# Patient Record
Sex: FEMALE | Race: WHITE | NOT HISPANIC OR LATINO | Employment: OTHER | URBAN - METROPOLITAN AREA
[De-identification: names, ages, dates, MRNs, and addresses within clinical notes are randomized per-mention and may not be internally consistent; named-entity substitution may affect disease eponyms.]

---

## 2018-01-08 ENCOUNTER — CONVERSION ENCOUNTER (OUTPATIENT)
Dept: MAMMOGRAPHY | Facility: HOSPITAL | Age: 68
End: 2018-01-08

## 2018-03-29 ENCOUNTER — OFFICE VISIT CONVERTED (OUTPATIENT)
Dept: SURGERY | Facility: CLINIC | Age: 68
End: 2018-03-29
Attending: SURGERY

## 2018-04-11 ENCOUNTER — OFFICE VISIT CONVERTED (OUTPATIENT)
Dept: SURGERY | Facility: CLINIC | Age: 68
End: 2018-04-11
Attending: SURGERY

## 2018-05-14 ENCOUNTER — OFFICE VISIT CONVERTED (OUTPATIENT)
Dept: UROLOGY | Facility: CLINIC | Age: 68
End: 2018-05-14
Attending: UROLOGY

## 2018-05-14 ENCOUNTER — CONVERSION ENCOUNTER (OUTPATIENT)
Dept: SURGERY | Facility: CLINIC | Age: 68
End: 2018-05-14

## 2018-08-16 ENCOUNTER — CONVERSION ENCOUNTER (OUTPATIENT)
Dept: SURGERY | Facility: CLINIC | Age: 68
End: 2018-08-16

## 2018-08-16 ENCOUNTER — OFFICE VISIT CONVERTED (OUTPATIENT)
Dept: UROLOGY | Facility: CLINIC | Age: 68
End: 2018-08-16
Attending: UROLOGY

## 2018-08-29 ENCOUNTER — OFFICE VISIT CONVERTED (OUTPATIENT)
Dept: UROLOGY | Facility: CLINIC | Age: 68
End: 2018-08-29
Attending: UROLOGY

## 2018-08-29 ENCOUNTER — CONVERSION ENCOUNTER (OUTPATIENT)
Dept: SURGERY | Facility: CLINIC | Age: 68
End: 2018-08-29

## 2018-10-31 ENCOUNTER — OFFICE VISIT CONVERTED (OUTPATIENT)
Dept: ORTHOPEDIC SURGERY | Facility: CLINIC | Age: 68
End: 2018-10-31
Attending: ORTHOPAEDIC SURGERY

## 2018-12-10 ENCOUNTER — OFFICE VISIT CONVERTED (OUTPATIENT)
Dept: UROLOGY | Facility: CLINIC | Age: 68
End: 2018-12-10
Attending: UROLOGY

## 2019-01-09 ENCOUNTER — HOSPITAL ENCOUNTER (OUTPATIENT)
Dept: OTHER | Facility: HOSPITAL | Age: 69
Discharge: HOME | End: 2019-01-09
Attending: INTERNAL MEDICINE

## 2019-01-10 LAB — HCV AB S/CO SERPL IA: 0.1 S/CO RATIO (ref 0–0.9)

## 2019-01-21 ENCOUNTER — CONVERSION ENCOUNTER (OUTPATIENT)
Dept: SURGERY | Facility: CLINIC | Age: 69
End: 2019-01-21

## 2019-01-21 ENCOUNTER — OFFICE VISIT CONVERTED (OUTPATIENT)
Dept: UROLOGY | Facility: CLINIC | Age: 69
End: 2019-01-21
Attending: UROLOGY

## 2019-02-18 ENCOUNTER — OFFICE VISIT CONVERTED (OUTPATIENT)
Dept: UROLOGY | Facility: CLINIC | Age: 69
End: 2019-02-18
Attending: UROLOGY

## 2019-02-18 ENCOUNTER — CONVERSION ENCOUNTER (OUTPATIENT)
Dept: SURGERY | Facility: CLINIC | Age: 69
End: 2019-02-18

## 2019-05-06 ENCOUNTER — OFFICE VISIT CONVERTED (OUTPATIENT)
Dept: ORTHOPEDIC SURGERY | Facility: CLINIC | Age: 69
End: 2019-05-06
Attending: ORTHOPAEDIC SURGERY

## 2019-06-10 ENCOUNTER — OFFICE VISIT CONVERTED (OUTPATIENT)
Dept: UROLOGY | Facility: CLINIC | Age: 69
End: 2019-06-10
Attending: UROLOGY

## 2019-06-10 ENCOUNTER — HOSPITAL ENCOUNTER (OUTPATIENT)
Dept: SURGERY | Facility: CLINIC | Age: 69
Discharge: HOME | End: 2019-06-10
Attending: UROLOGY

## 2019-06-12 LAB — BACTERIA UR CULT: NORMAL

## 2019-06-19 ENCOUNTER — OFFICE VISIT CONVERTED (OUTPATIENT)
Dept: SURGERY | Facility: CLINIC | Age: 69
End: 2019-06-19
Attending: SURGERY

## 2019-07-03 ENCOUNTER — OFFICE VISIT CONVERTED (OUTPATIENT)
Dept: SURGERY | Facility: CLINIC | Age: 69
End: 2019-07-03
Attending: SURGERY

## 2020-01-10 ENCOUNTER — HOSPITAL ENCOUNTER (OUTPATIENT)
Dept: MAMMOGRAPHY | Facility: HOSPITAL | Age: 70
Discharge: HOME | End: 2020-01-10
Attending: NURSE PRACTITIONER

## 2020-02-04 ENCOUNTER — OFFICE VISIT CONVERTED (OUTPATIENT)
Dept: ORTHOPEDIC SURGERY | Facility: CLINIC | Age: 70
End: 2020-02-04
Attending: ORTHOPAEDIC SURGERY

## 2020-02-04 ENCOUNTER — CONVERSION ENCOUNTER (OUTPATIENT)
Dept: ORTHOPEDIC SURGERY | Facility: CLINIC | Age: 70
End: 2020-02-04

## 2020-02-13 ENCOUNTER — OFFICE VISIT CONVERTED (OUTPATIENT)
Dept: ORTHOPEDIC SURGERY | Facility: CLINIC | Age: 70
End: 2020-02-13
Attending: PHYSICIAN ASSISTANT

## 2020-02-13 ENCOUNTER — CONVERSION ENCOUNTER (OUTPATIENT)
Dept: ORTHOPEDIC SURGERY | Facility: CLINIC | Age: 70
End: 2020-02-13

## 2020-03-05 ENCOUNTER — CONVERSION ENCOUNTER (OUTPATIENT)
Dept: ORTHOPEDIC SURGERY | Facility: CLINIC | Age: 70
End: 2020-03-05

## 2020-03-05 ENCOUNTER — OFFICE VISIT CONVERTED (OUTPATIENT)
Dept: ORTHOPEDIC SURGERY | Facility: CLINIC | Age: 70
End: 2020-03-05
Attending: PHYSICIAN ASSISTANT

## 2020-06-30 ENCOUNTER — HOSPITAL ENCOUNTER (OUTPATIENT)
Dept: OTHER | Facility: HOSPITAL | Age: 70
Discharge: HOME | End: 2020-06-30
Attending: NURSE PRACTITIONER

## 2020-06-30 LAB
APPEARANCE UR: ABNORMAL
BILIRUB UR QL: NEGATIVE
COLOR UR: YELLOW
CONV BACTERIA: ABNORMAL
CONV COLLECTION SOURCE (UA): ABNORMAL
CONV UROBILINOGEN IN URINE BY AUTOMATED TEST STRIP: 0.2 {EHRLICHU}/DL (ref 0.1–1)
CONV YEAST, UA: PRESENT
GLUCOSE UR QL: NEGATIVE MG/DL
HGB UR QL STRIP: NEGATIVE
KETONES UR QL STRIP: NEGATIVE MG/DL
LEUKOCYTE ESTERASE UR QL STRIP: ABNORMAL
NITRITE UR QL STRIP: NEGATIVE
PH UR STRIP.AUTO: 6.5 [PH] (ref 5–8)
PROT UR QL: NEGATIVE MG/DL
RBC #/AREA URNS HPF: ABNORMAL /[HPF]
SP GR UR: 1.01 (ref 1–1.03)
SQUAMOUS SPT QL MICRO: ABNORMAL /[HPF]
WBC #/AREA URNS HPF: ABNORMAL /[HPF]

## 2020-07-03 LAB
AMOXICILLIN+CLAV SUSC ISLT: 16
AMPICILLIN SUSC ISLT: >=32
AMPICILLIN+SULBAC SUSC ISLT: >=32
BACTERIA UR CULT: ABNORMAL
CEFAZOLIN SUSC ISLT: <=4
CEFEPIME SUSC ISLT: <=1
CEFTAZIDIME SUSC ISLT: <=1
CEFTRIAXONE SUSC ISLT: <=1
CEFUROXIME ORAL SUSC ISLT: 4
CEFUROXIME PARENTER SUSC ISLT: 4
CIPROFLOXACIN SUSC ISLT: <=0.25
ERTAPENEM SUSC ISLT: <=0.5
GENTAMICIN SUSC ISLT: <=1
LEVOFLOXACIN SUSC ISLT: <=0.12
NITROFURANTOIN SUSC ISLT: <=16
TETRACYCLINE SUSC ISLT: <=1
TMP SMX SUSC ISLT: >=320
TOBRAMYCIN SUSC ISLT: <=1

## 2020-08-13 ENCOUNTER — HOSPITAL ENCOUNTER (OUTPATIENT)
Dept: OTHER | Facility: HOSPITAL | Age: 70
Discharge: HOME | End: 2020-08-13

## 2020-08-31 ENCOUNTER — HOSPITAL ENCOUNTER (OUTPATIENT)
Dept: INFUSION THERAPY | Facility: HOSPITAL | Age: 70
Discharge: HOME | End: 2020-08-31
Attending: NURSE PRACTITIONER

## 2020-08-31 LAB
ANION GAP SERPL CALC-SCNC: 15 MMOL/L (ref 8–19)
BUN SERPL-MCNC: 12 MG/DL (ref 5–25)
BUN/CREAT SERPL: 15 {RATIO} (ref 6–20)
CALCIUM SERPL-MCNC: 10 MG/DL (ref 8.7–10.4)
CHLORIDE SERPL-SCNC: 102 MMOL/L (ref 99–111)
CONV CO2: 25 MMOL/L (ref 22–32)
CREAT UR-MCNC: 0.8 MG/DL (ref 0.5–0.9)
GFR SERPLBLD BASED ON 1.73 SQ M-ARVRAT: >60 ML/MIN/{1.73_M2}
GLUCOSE SERPL-MCNC: 138 MG/DL (ref 65–99)
OSMOLALITY SERPL CALC.SUM OF ELEC: 288 MOSM/KG (ref 273–304)
POTASSIUM SERPL-SCNC: 4.2 MMOL/L (ref 3.5–5.3)
SODIUM SERPL-SCNC: 138 MMOL/L (ref 135–147)

## 2020-10-06 ENCOUNTER — HOSPITAL ENCOUNTER (OUTPATIENT)
Dept: OTHER | Facility: HOSPITAL | Age: 70
Discharge: HOME | End: 2020-10-06
Attending: PODIATRIST

## 2020-10-06 LAB
ALBUMIN SERPL-MCNC: 4.1 G/DL (ref 3.5–5)
ALP SERPL-CCNC: 96 U/L (ref 43–160)
ALT SERPL-CCNC: 30 U/L (ref 10–40)
AST SERPL-CCNC: 23 U/L (ref 15–50)
BILIRUB SERPL-MCNC: 0.42 MG/DL (ref 0.2–1.3)
CONV BILI, CONJUGATED: <0.2 MG/DL (ref 0–0.6)
CONV TOTAL PROTEIN: 6.9 G/DL (ref 6.3–8.2)
CONV UNCONJUGATED BILIRUBIN: 0.2 MG/DL (ref 0–1.1)

## 2021-01-05 ENCOUNTER — HOSPITAL ENCOUNTER (OUTPATIENT)
Dept: OTHER | Facility: HOSPITAL | Age: 71
Discharge: HOME | End: 2021-01-05
Attending: NURSE PRACTITIONER

## 2021-01-05 LAB
APPEARANCE UR: CLEAR
BILIRUB UR QL: NEGATIVE
COLOR UR: YELLOW
CONV BACTERIA: ABNORMAL
CONV COLLECTION SOURCE (UA): ABNORMAL
CONV UROBILINOGEN IN URINE BY AUTOMATED TEST STRIP: 0.2 {EHRLICHU}/DL (ref 0.1–1)
GLUCOSE UR QL: NEGATIVE MG/DL
HGB UR QL STRIP: NEGATIVE
KETONES UR QL STRIP: NEGATIVE MG/DL
LEUKOCYTE ESTERASE UR QL STRIP: ABNORMAL
NITRITE UR QL STRIP: NEGATIVE
PH UR STRIP.AUTO: 6.5 [PH] (ref 5–8)
PROT UR QL: NEGATIVE MG/DL
RBC #/AREA URNS HPF: ABNORMAL /[HPF]
SP GR UR: 1.01 (ref 1–1.03)
SQUAMOUS SPT QL MICRO: ABNORMAL /[HPF]
WBC #/AREA URNS HPF: ABNORMAL /[HPF]

## 2021-01-08 LAB
AMPICILLIN SUSC ISLT: <=2
AMPICILLIN+SULBAC SUSC ISLT: <=2
BACTERIA UR CULT: ABNORMAL
CEFAZOLIN SUSC ISLT: <=4
CEFEPIME SUSC ISLT: <=0.12
CEFTAZIDIME SUSC ISLT: <=1
CEFTRIAXONE SUSC ISLT: <=0.25
CIPROFLOXACIN SUSC ISLT: <=0.25
ERTAPENEM SUSC ISLT: <=0.12
GENTAMICIN SUSC ISLT: <=1
LEVOFLOXACIN SUSC ISLT: <=0.12
NITROFURANTOIN SUSC ISLT: <=16
PIP+TAZO SUSC ISLT: <=4
TMP SMX SUSC ISLT: <=20
TOBRAMYCIN SUSC ISLT: <=1

## 2021-03-04 ENCOUNTER — HOSPITAL ENCOUNTER (OUTPATIENT)
Dept: OTHER | Facility: HOSPITAL | Age: 71
Discharge: HOME | End: 2021-03-04
Attending: NURSE PRACTITIONER

## 2021-03-04 LAB
APPEARANCE UR: ABNORMAL
BILIRUB UR QL: NEGATIVE
COLOR UR: YELLOW
CONV BACTERIA: ABNORMAL
CONV COLLECTION SOURCE (UA): ABNORMAL
CONV HYALINE CASTS IN URINE MICRO: ABNORMAL /[LPF]
CONV UROBILINOGEN IN URINE BY AUTOMATED TEST STRIP: 0.2 {EHRLICHU}/DL (ref 0.1–1)
GLUCOSE UR QL: NEGATIVE MG/DL
HGB UR QL STRIP: NEGATIVE
KETONES UR QL STRIP: NEGATIVE MG/DL
LEUKOCYTE ESTERASE UR QL STRIP: ABNORMAL
NITRITE UR QL STRIP: NEGATIVE
PH UR STRIP.AUTO: 5.5 [PH] (ref 5–8)
PROT UR QL: NEGATIVE MG/DL
RBC #/AREA URNS HPF: ABNORMAL /[HPF]
SP GR UR: 1.01 (ref 1–1.03)
SQUAMOUS SPT QL MICRO: ABNORMAL /[HPF]
WBC #/AREA URNS HPF: ABNORMAL /[HPF]

## 2021-03-07 LAB
AMPICILLIN SUSC ISLT: 4
AMPICILLIN+SULBAC SUSC ISLT: <=2
BACTERIA UR CULT: ABNORMAL
CEFAZOLIN SUSC ISLT: <=4
CEFEPIME SUSC ISLT: <=0.12
CEFTAZIDIME SUSC ISLT: <=1
CEFTRIAXONE SUSC ISLT: <=0.25
CIPROFLOXACIN SUSC ISLT: <=0.25
ERTAPENEM SUSC ISLT: <=0.12
GENTAMICIN SUSC ISLT: <=1
LEVOFLOXACIN SUSC ISLT: <=0.12
NITROFURANTOIN SUSC ISLT: <=16
PIP+TAZO SUSC ISLT: <=4
TMP SMX SUSC ISLT: <=20
TOBRAMYCIN SUSC ISLT: <=1

## 2021-05-15 VITALS — HEIGHT: 63 IN | WEIGHT: 160 LBS | RESPIRATION RATE: 16 BRPM | BODY MASS INDEX: 28.35 KG/M2

## 2021-05-15 VITALS
BODY MASS INDEX: 29.28 KG/M2 | SYSTOLIC BLOOD PRESSURE: 146 MMHG | HEIGHT: 62 IN | DIASTOLIC BLOOD PRESSURE: 76 MMHG | WEIGHT: 159.12 LBS

## 2021-05-15 VITALS — OXYGEN SATURATION: 96 % | HEART RATE: 92 BPM | HEIGHT: 63 IN

## 2021-05-15 VITALS — BODY MASS INDEX: 28 KG/M2 | HEIGHT: 63 IN | HEART RATE: 110 BPM | WEIGHT: 158 LBS | OXYGEN SATURATION: 97 %

## 2021-05-15 VITALS — HEIGHT: 63 IN | BODY MASS INDEX: 28.93 KG/M2 | RESPIRATION RATE: 16 BRPM | WEIGHT: 163.25 LBS

## 2021-05-15 VITALS — BODY MASS INDEX: 29.23 KG/M2 | HEIGHT: 63 IN | WEIGHT: 165 LBS | HEART RATE: 120 BPM | OXYGEN SATURATION: 98 %

## 2021-05-15 VITALS — HEART RATE: 64 BPM | BODY MASS INDEX: 28.7 KG/M2 | OXYGEN SATURATION: 96 % | WEIGHT: 162 LBS | HEIGHT: 63 IN

## 2021-05-16 VITALS — WEIGHT: 16 LBS | HEIGHT: 64 IN | BODY MASS INDEX: 2.73 KG/M2 | RESPIRATION RATE: 16 BRPM

## 2021-05-16 VITALS
DIASTOLIC BLOOD PRESSURE: 72 MMHG | HEIGHT: 63 IN | BODY MASS INDEX: 28.88 KG/M2 | SYSTOLIC BLOOD PRESSURE: 116 MMHG | WEIGHT: 163 LBS

## 2021-05-16 VITALS
BODY MASS INDEX: 28.7 KG/M2 | HEIGHT: 63 IN | WEIGHT: 162 LBS | DIASTOLIC BLOOD PRESSURE: 82 MMHG | SYSTOLIC BLOOD PRESSURE: 140 MMHG

## 2021-05-16 VITALS — BODY MASS INDEX: 28.88 KG/M2 | HEIGHT: 63 IN | RESPIRATION RATE: 14 BRPM | WEIGHT: 163 LBS

## 2021-05-16 VITALS
SYSTOLIC BLOOD PRESSURE: 140 MMHG | BODY MASS INDEX: 27.34 KG/M2 | HEIGHT: 64 IN | DIASTOLIC BLOOD PRESSURE: 71 MMHG | WEIGHT: 160.12 LBS

## 2021-05-16 VITALS
SYSTOLIC BLOOD PRESSURE: 142 MMHG | BODY MASS INDEX: 27.55 KG/M2 | HEIGHT: 64 IN | DIASTOLIC BLOOD PRESSURE: 80 MMHG | WEIGHT: 161.37 LBS

## 2021-05-16 VITALS
SYSTOLIC BLOOD PRESSURE: 138 MMHG | BODY MASS INDEX: 28.79 KG/M2 | WEIGHT: 162.5 LBS | DIASTOLIC BLOOD PRESSURE: 70 MMHG | HEIGHT: 63 IN

## 2021-05-16 VITALS — HEART RATE: 84 BPM | OXYGEN SATURATION: 97 % | HEIGHT: 63 IN | BODY MASS INDEX: 28.7 KG/M2 | WEIGHT: 162 LBS

## 2021-05-16 VITALS — HEIGHT: 64 IN | RESPIRATION RATE: 16 BRPM | WEIGHT: 164 LBS | BODY MASS INDEX: 28 KG/M2

## 2021-05-22 ENCOUNTER — TRANSCRIBE ORDERS (OUTPATIENT)
Dept: ADMINISTRATIVE | Facility: HOSPITAL | Age: 71
End: 2021-05-22

## 2021-05-22 DIAGNOSIS — M81.0 OSTEOPOROSIS, UNSPECIFIED OSTEOPOROSIS TYPE, UNSPECIFIED PATHOLOGICAL FRACTURE PRESENCE: Primary | ICD-10-CM

## 2021-05-22 DIAGNOSIS — Z12.39 SCREENING BREAST EXAMINATION: Primary | ICD-10-CM

## 2021-05-27 ENCOUNTER — HOSPITAL ENCOUNTER (OUTPATIENT)
Dept: OTHER | Facility: HOSPITAL | Age: 71
Discharge: HOME | End: 2021-05-27
Attending: NURSE PRACTITIONER

## 2021-05-29 LAB — BACTERIA UR CULT: NORMAL

## 2021-06-02 ENCOUNTER — OFFICE VISIT CONVERTED (OUTPATIENT)
Dept: ORTHOPEDIC SURGERY | Facility: CLINIC | Age: 71
End: 2021-06-02
Attending: ORTHOPAEDIC SURGERY

## 2021-06-05 NOTE — H&P
History and Physical      Patient Name: Rebecca Mchugh   Patient ID: [id number]   Sex: Female   YOB: 1950    Primary Care Provider: Colin Jamil MD   Referring Provider: Heather FRANCIS    Visit Date: June 2, 2021    Provider: Eyad Griffith MD   Location: [facility]   Location Address: [address] [place] [address]  [id number]   Location Phone: [phone]          Chief Complaint  · Right Trigger Thumb      History Of Present Illness  Rebecca Mchugh is a 70 year old /White female who presents today to [facility].      Patient presents today for an evaluation of right thumb. She states she has locking and catching of the thumb. She states locking and catching has been ongoing for 2 months. She states that is has become more painful overtime. She denies any trauma or injury to her right thumb.       Past Medical History  Bladder disorder; Breast CA; Cancer; Colitis; Diabetes; Diabetes mellitus; GERD; Hyperlipemia; Recurrent UTI (urinary tract infection); Reflux; Renal calculus or stone; Seasonal allergies; Urinary incontinence in female         Past Surgical History  Back; Bladder Repair; Breast biopsy; Cholecystecomy; Colonoscopy; Cystoscopy with dilation; Excision of vaginal mesh by vaginal approach; Eye Implant; Gallbladder; Hysterectomy; Hysterectomy-Abdominal; Lumpectomy of right breast mass         Medication List  Anusol-HC 25 mg rectal suppository; aspirin 81 mg oral tablet,delayed release (DR/EC); bethanechol chloride 25 mg oral tablet; Calcium 600 + D(3) 600 mg(1,500mg) -400 unit oral tablet; Centrum Silver oral; Estrace 0.01 % (0.1 mg/gram) vaginal cream; Fiber Gummies (with chromium) 2-100 gram-mcg oral tablet,chewable; Fish Oil 1,000 mg (120 mg-180 mg) oral capsule; Flomax 0.4 mg oral capsule; glipizide 5 mg oral tablet; Januvia 100 mg oral tablet; Jardiance 10 mg oral tablet; levocetirizine 5 mg oral tablet; lidocaine HCl 2 % mucous membrane  "jelly in applicator; Lipitor 20 mg oral tablet; metformin 500 mg oral tablet; Myrbetriq 25 mg oral tablet extended release 24 hr; Norco 7.5-325 mg oral tablet; omeprazole 40 mg oral capsule,delayed release(DR/EC); Trulicity 0.75 mg/0.5 mL subcutaneous pen injector         Allergy List  NO KNOWN DRUG ALLERGIES; Strawberry       Allergies Reconciled  Family Medical History  Heart Disease; Cancer, Unspecified; Diabetes, unspecified type; Breast Neoplasm; Family history of breast cancer; -Father's Family History Unknown         Social History  Alcohol (Never); Alcohol Use (Never); Caffeine (Current every day); lives alone; Recreational Drug Use (Never); Retired; Retired.; Second hand smoke exposure (Never); Tobacco (Former);          Review of Systems  · Constitutional  o Denies  o : fever, chills, weight loss  · Cardiovascular  o Denies  o : chest pain, shortness of breath  · Gastrointestinal  o Denies  o : liver disease, heartburn, nausea, blood in stools  · Genitourinary  o Denies  o : painful urination, blood in urine  · Integument  o Denies  o : rash, itching  · Neurologic  o Denies  o : headache, weakness, loss of consciousness  · Musculoskeletal  o Denies  o : painful, swollen joints  · Psychiatric  o Denies  o : drug/alcohol addiction, anxiety, depression      Vitals  Date Time BP Position Site L\R Cuff Size HR RR TEMP (F) WT  HT  BMI kg/m2 BSA m2 O2 Sat FR L/min FiO2 HC       06/02/2021 12:55 PM         157lbs 16oz 5'  3\" 27.99 1.78             Physical Examination  · Constitutional  o Appearance  o : well developed, well-nourished, no obvious deformities present  · Head and Face  o Head  o :   § Inspection  § : normocephalic  o Face  o :   § Inspection  § : no facial lesions  · Eyes  o Conjunctivae  o : conjunctivae normal  o Sclerae  o : sclerae white  · Ears, Nose, Mouth and Throat  o Ears  o :   § External Ears  § : appearance within normal limits  § Hearing  § : intact  o Nose  o :   § External " Nose  § : appearance normal  · Neck  o Inspection/Palpation  o : normal appearance  o Range of Motion  o : full range of motion  · Respiratory  o Respiratory Effort  o : breathing unlabored  o Inspection of Chest  o : normal appearance  o Auscultation of Lungs  o : no audible wheezing or rales  · Cardiovascular  o Heart  o : regular rate  · Gastrointestinal  o Abdominal Examination  o : soft and non-tender  · Skin and Subcutaneous Tissue  o General Inspection  o : intact, no rashes  · Psychiatric  o General  o : Alert and oriented x3  o Judgement and Insight  o : judgment and insight intact  o Mood and Affect  o : mood normal, affect appropriate  · Right Hand  o Inspection  o : Tender A1 pulley. Positive triggering of the thumb. Neurovascular intact. Sensation grossly intact. No swelling, atrophy, and skin discoloration. Skin intact. Full ROM. Patient able to wiggle fingers and make a fist. Full wrist extension, full wrist flexion, full , full thumb opposition, full PIP flexors, full DIP flexors, full PIP extensors, full finger adduction, full finger abduction. Radial pulse 2+, ulnar pulse 2+.          Assessment  · Trigger finger of right thumb     727.03/M65.311      Plan  · Medications  o Medications have been Reconciled  o Transition of Care or Provider Policy  · Instructions  o  Been saw and examined the patient and agrees with plan.   o Reviewed the patient's Past Medical, Social, and Family history as well as the ROS at today's visit, no changes.  o Call or return if worsening symptoms.  o Discussed surgery.  o Risks/benefits discussed with patient including, but not limited to: infection, bleeding, neurovascular damage, re-rupture, aesthetic deformity, need for further surgery, and death.  o Surgery pamphlet given.  o Follow Up post-op.  o Discussed treatment plans and diagnosis with the patient. Discussed operative vs non-operative measures. Patient wishes to proceed with a right thumb trigger  release.   o The above service was scribed by Elsie Scott on my behalf and I attest to the accuracy of the note. surjit  o Electronically Identified Patient Education Materials Provided Electronically            Electronically Signed by: Elsie Scott-, Other -Author on Sharon 3, 2021 10:31:17 AM  Electronically Co-signed by: Eyad Griffith MD -Reviewer on June 4, 2021 05:28:25 PM

## 2021-06-05 NOTE — H&P (VIEW-ONLY)
History and Physical      Patient Name: Rebecca Mchugh   Patient ID: [id number]   Sex: Female   YOB: 1950    Primary Care Provider: Colin Jamil MD   Referring Provider: Heather FRANCIS    Visit Date: June 2, 2021    Provider: Eyad Griffith MD   Location: [facility]   Location Address: [address] [place] [address]  [id number]   Location Phone: [phone]          Chief Complaint  · Right Trigger Thumb      History Of Present Illness  Rebecca Mchugh is a 70 year old /White female who presents today to [facility].      Patient presents today for an evaluation of right thumb. She states she has locking and catching of the thumb. She states locking and catching has been ongoing for 2 months. She states that is has become more painful overtime. She denies any trauma or injury to her right thumb.       Past Medical History  Bladder disorder; Breast CA; Cancer; Colitis; Diabetes; Diabetes mellitus; GERD; Hyperlipemia; Recurrent UTI (urinary tract infection); Reflux; Renal calculus or stone; Seasonal allergies; Urinary incontinence in female         Past Surgical History  Back; Bladder Repair; Breast biopsy; Cholecystecomy; Colonoscopy; Cystoscopy with dilation; Excision of vaginal mesh by vaginal approach; Eye Implant; Gallbladder; Hysterectomy; Hysterectomy-Abdominal; Lumpectomy of right breast mass         Medication List  Anusol-HC 25 mg rectal suppository; aspirin 81 mg oral tablet,delayed release (DR/EC); bethanechol chloride 25 mg oral tablet; Calcium 600 + D(3) 600 mg(1,500mg) -400 unit oral tablet; Centrum Silver oral; Estrace 0.01 % (0.1 mg/gram) vaginal cream; Fiber Gummies (with chromium) 2-100 gram-mcg oral tablet,chewable; Fish Oil 1,000 mg (120 mg-180 mg) oral capsule; Flomax 0.4 mg oral capsule; glipizide 5 mg oral tablet; Januvia 100 mg oral tablet; Jardiance 10 mg oral tablet; levocetirizine 5 mg oral tablet; lidocaine HCl 2 % mucous membrane  "jelly in applicator; Lipitor 20 mg oral tablet; metformin 500 mg oral tablet; Myrbetriq 25 mg oral tablet extended release 24 hr; Norco 7.5-325 mg oral tablet; omeprazole 40 mg oral capsule,delayed release(DR/EC); Trulicity 0.75 mg/0.5 mL subcutaneous pen injector         Allergy List  NO KNOWN DRUG ALLERGIES; Strawberry       Allergies Reconciled  Family Medical History  Heart Disease; Cancer, Unspecified; Diabetes, unspecified type; Breast Neoplasm; Family history of breast cancer; -Father's Family History Unknown         Social History  Alcohol (Never); Alcohol Use (Never); Caffeine (Current every day); lives alone; Recreational Drug Use (Never); Retired; Retired.; Second hand smoke exposure (Never); Tobacco (Former);          Review of Systems  · Constitutional  o Denies  o : fever, chills, weight loss  · Cardiovascular  o Denies  o : chest pain, shortness of breath  · Gastrointestinal  o Denies  o : liver disease, heartburn, nausea, blood in stools  · Genitourinary  o Denies  o : painful urination, blood in urine  · Integument  o Denies  o : rash, itching  · Neurologic  o Denies  o : headache, weakness, loss of consciousness  · Musculoskeletal  o Denies  o : painful, swollen joints  · Psychiatric  o Denies  o : drug/alcohol addiction, anxiety, depression      Vitals  Date Time BP Position Site L\R Cuff Size HR RR TEMP (F) WT  HT  BMI kg/m2 BSA m2 O2 Sat FR L/min FiO2 HC       06/02/2021 12:55 PM         157lbs 16oz 5'  3\" 27.99 1.78             Physical Examination  · Constitutional  o Appearance  o : well developed, well-nourished, no obvious deformities present  · Head and Face  o Head  o :   § Inspection  § : normocephalic  o Face  o :   § Inspection  § : no facial lesions  · Eyes  o Conjunctivae  o : conjunctivae normal  o Sclerae  o : sclerae white  · Ears, Nose, Mouth and Throat  o Ears  o :   § External Ears  § : appearance within normal limits  § Hearing  § : intact  o Nose  o :   § External " Nose  § : appearance normal  · Neck  o Inspection/Palpation  o : normal appearance  o Range of Motion  o : full range of motion  · Respiratory  o Respiratory Effort  o : breathing unlabored  o Inspection of Chest  o : normal appearance  o Auscultation of Lungs  o : no audible wheezing or rales  · Cardiovascular  o Heart  o : regular rate  · Gastrointestinal  o Abdominal Examination  o : soft and non-tender  · Skin and Subcutaneous Tissue  o General Inspection  o : intact, no rashes  · Psychiatric  o General  o : Alert and oriented x3  o Judgement and Insight  o : judgment and insight intact  o Mood and Affect  o : mood normal, affect appropriate  · Right Hand  o Inspection  o : Tender A1 pulley. Positive triggering of the thumb. Neurovascular intact. Sensation grossly intact. No swelling, atrophy, and skin discoloration. Skin intact. Full ROM. Patient able to wiggle fingers and make a fist. Full wrist extension, full wrist flexion, full , full thumb opposition, full PIP flexors, full DIP flexors, full PIP extensors, full finger adduction, full finger abduction. Radial pulse 2+, ulnar pulse 2+.          Assessment  · Trigger finger of right thumb     727.03/M65.311      Plan  · Medications  o Medications have been Reconciled  o Transition of Care or Provider Policy  · Instructions  o  Been saw and examined the patient and agrees with plan.   o Reviewed the patient's Past Medical, Social, and Family history as well as the ROS at today's visit, no changes.  o Call or return if worsening symptoms.  o Discussed surgery.  o Risks/benefits discussed with patient including, but not limited to: infection, bleeding, neurovascular damage, re-rupture, aesthetic deformity, need for further surgery, and death.  o Surgery pamphlet given.  o Follow Up post-op.  o Discussed treatment plans and diagnosis with the patient. Discussed operative vs non-operative measures. Patient wishes to proceed with a right thumb trigger  release.   o The above service was scribed by Elsie Scott on my behalf and I attest to the accuracy of the note. surjit  o Electronically Identified Patient Education Materials Provided Electronically            Electronically Signed by: Elsie Scott-, Other -Author on Sharon 3, 2021 10:31:17 AM  Electronically Co-signed by: Eyad Griffith MD -Reviewer on June 4, 2021 05:28:25 PM

## 2021-06-16 RX ORDER — OMEPRAZOLE 40 MG/1
40 CAPSULE, DELAYED RELEASE ORAL DAILY
COMMUNITY

## 2021-06-16 RX ORDER — AMITRIPTYLINE HYDROCHLORIDE 25 MG/1
25 TABLET, FILM COATED ORAL NIGHTLY
COMMUNITY

## 2021-06-16 RX ORDER — PHENOL 1.4 %
600 AEROSOL, SPRAY (ML) MUCOUS MEMBRANE 2 TIMES DAILY WITH MEALS
COMMUNITY

## 2021-06-16 RX ORDER — MULTIPLE VITAMINS W/ MINERALS TAB 9MG-400MCG
1 TAB ORAL DAILY
COMMUNITY

## 2021-06-16 RX ORDER — FESOTERODINE FUMARATE 8 MG/1
8 TABLET, EXTENDED RELEASE ORAL
COMMUNITY

## 2021-06-16 RX ORDER — ATORVASTATIN CALCIUM 40 MG/1
40 TABLET, FILM COATED ORAL DAILY
COMMUNITY

## 2021-06-16 RX ORDER — LEVOCETIRIZINE DIHYDROCHLORIDE 5 MG/1
5 TABLET, FILM COATED ORAL EVERY EVENING
COMMUNITY

## 2021-06-17 ENCOUNTER — HOSPITAL ENCOUNTER (OUTPATIENT)
Facility: HOSPITAL | Age: 71
Setting detail: HOSPITAL OUTPATIENT SURGERY
Discharge: HOME | End: 2021-06-17
Attending: ORTHOPAEDIC SURGERY | Admitting: ORTHOPAEDIC SURGERY

## 2021-06-17 ENCOUNTER — ANESTHESIA (OUTPATIENT)
Dept: PERIOP | Facility: HOSPITAL | Age: 71
End: 2021-06-17

## 2021-06-17 ENCOUNTER — ANESTHESIA EVENT (OUTPATIENT)
Dept: PERIOP | Facility: HOSPITAL | Age: 71
End: 2021-06-17

## 2021-06-17 VITALS
BODY MASS INDEX: 26.17 KG/M2 | HEART RATE: 86 BPM | SYSTOLIC BLOOD PRESSURE: 138 MMHG | OXYGEN SATURATION: 92 % | WEIGHT: 147.71 LBS | DIASTOLIC BLOOD PRESSURE: 68 MMHG | TEMPERATURE: 97 F | HEIGHT: 63 IN | RESPIRATION RATE: 14 BRPM

## 2021-06-17 DIAGNOSIS — M65.311 TRIGGER THUMB OF RIGHT HAND: Primary | ICD-10-CM

## 2021-06-17 PROCEDURE — 25010000002 FENTANYL CITRATE (PF) 50 MCG/ML SOLUTION: Performed by: NURSE ANESTHETIST, CERTIFIED REGISTERED

## 2021-06-17 PROCEDURE — 25010000002 PROPOFOL 10 MG/ML EMULSION: Performed by: NURSE ANESTHETIST, CERTIFIED REGISTERED

## 2021-06-17 PROCEDURE — 25010000002 MIDAZOLAM PER 1MG: Performed by: ANESTHESIOLOGY

## 2021-06-17 PROCEDURE — 26055 INCISE FINGER TENDON SHEATH: CPT | Performed by: ORTHOPAEDIC SURGERY

## 2021-06-17 PROCEDURE — 25010000002 DEXAMETHASONE PER 1 MG: Performed by: NURSE ANESTHETIST, CERTIFIED REGISTERED

## 2021-06-17 PROCEDURE — 25010000003 CEFAZOLIN IN DEXTROSE 2-4 GM/100ML-% SOLUTION: Performed by: ORTHOPAEDIC SURGERY

## 2021-06-17 PROCEDURE — 25010000002 ONDANSETRON PER 1 MG: Performed by: NURSE ANESTHETIST, CERTIFIED REGISTERED

## 2021-06-17 RX ORDER — ONDANSETRON 2 MG/ML
4 INJECTION INTRAMUSCULAR; INTRAVENOUS ONCE AS NEEDED
Status: DISCONTINUED | OUTPATIENT
Start: 2021-06-17 | End: 2021-06-17 | Stop reason: HOSPADM

## 2021-06-17 RX ORDER — PROMETHAZINE HYDROCHLORIDE 25 MG/1
25 SUPPOSITORY RECTAL ONCE AS NEEDED
Status: DISCONTINUED | OUTPATIENT
Start: 2021-06-17 | End: 2021-06-17 | Stop reason: HOSPADM

## 2021-06-17 RX ORDER — CEFAZOLIN SODIUM 2 G/100ML
2 INJECTION, SOLUTION INTRAVENOUS ONCE
Status: COMPLETED | OUTPATIENT
Start: 2021-06-17 | End: 2021-06-17

## 2021-06-17 RX ORDER — PROMETHAZINE HYDROCHLORIDE 12.5 MG/1
25 TABLET ORAL ONCE AS NEEDED
Status: DISCONTINUED | OUTPATIENT
Start: 2021-06-17 | End: 2021-06-17 | Stop reason: HOSPADM

## 2021-06-17 RX ORDER — MEPERIDINE HYDROCHLORIDE 25 MG/ML
12.5 INJECTION INTRAMUSCULAR; INTRAVENOUS; SUBCUTANEOUS
Status: DISCONTINUED | OUTPATIENT
Start: 2021-06-17 | End: 2021-06-17 | Stop reason: HOSPADM

## 2021-06-17 RX ORDER — HYDROCODONE BITARTRATE AND ACETAMINOPHEN 5; 325 MG/1; MG/1
1 TABLET ORAL EVERY 4 HOURS PRN
Qty: 10 TABLET | Refills: 0 | Status: SHIPPED | OUTPATIENT
Start: 2021-06-17 | End: 2022-05-12

## 2021-06-17 RX ORDER — ACETAMINOPHEN 500 MG
1000 TABLET ORAL ONCE
Status: COMPLETED | OUTPATIENT
Start: 2021-06-17 | End: 2021-06-17

## 2021-06-17 RX ORDER — SODIUM CHLORIDE, SODIUM LACTATE, POTASSIUM CHLORIDE, CALCIUM CHLORIDE 600; 310; 30; 20 MG/100ML; MG/100ML; MG/100ML; MG/100ML
9 INJECTION, SOLUTION INTRAVENOUS CONTINUOUS PRN
Status: DISCONTINUED | OUTPATIENT
Start: 2021-06-17 | End: 2021-06-17 | Stop reason: HOSPADM

## 2021-06-17 RX ORDER — MIDAZOLAM HYDROCHLORIDE 1 MG/ML
2 INJECTION INTRAMUSCULAR; INTRAVENOUS ONCE
Status: COMPLETED | OUTPATIENT
Start: 2021-06-17 | End: 2021-06-17

## 2021-06-17 RX ORDER — PROPOFOL 10 MG/ML
VIAL (ML) INTRAVENOUS AS NEEDED
Status: DISCONTINUED | OUTPATIENT
Start: 2021-06-17 | End: 2021-06-17 | Stop reason: SURG

## 2021-06-17 RX ORDER — FENTANYL CITRATE 50 UG/ML
INJECTION, SOLUTION INTRAMUSCULAR; INTRAVENOUS AS NEEDED
Status: DISCONTINUED | OUTPATIENT
Start: 2021-06-17 | End: 2021-06-17 | Stop reason: SURG

## 2021-06-17 RX ORDER — DEXAMETHASONE SODIUM PHOSPHATE 4 MG/ML
INJECTION, SOLUTION INTRA-ARTICULAR; INTRALESIONAL; INTRAMUSCULAR; INTRAVENOUS; SOFT TISSUE AS NEEDED
Status: DISCONTINUED | OUTPATIENT
Start: 2021-06-17 | End: 2021-06-17 | Stop reason: SURG

## 2021-06-17 RX ORDER — LIDOCAINE HYDROCHLORIDE 20 MG/ML
INJECTION, SOLUTION INFILTRATION; PERINEURAL AS NEEDED
Status: DISCONTINUED | OUTPATIENT
Start: 2021-06-17 | End: 2021-06-17 | Stop reason: SURG

## 2021-06-17 RX ORDER — ONDANSETRON 2 MG/ML
INJECTION INTRAMUSCULAR; INTRAVENOUS AS NEEDED
Status: DISCONTINUED | OUTPATIENT
Start: 2021-06-17 | End: 2021-06-17 | Stop reason: SURG

## 2021-06-17 RX ORDER — SODIUM CHLORIDE 0.9 % (FLUSH) 0.9 %
10 SYRINGE (ML) INJECTION AS NEEDED
Status: DISCONTINUED | OUTPATIENT
Start: 2021-06-17 | End: 2021-06-17 | Stop reason: HOSPADM

## 2021-06-17 RX ORDER — OXYCODONE HYDROCHLORIDE 5 MG/1
5 TABLET ORAL
Status: DISCONTINUED | OUTPATIENT
Start: 2021-06-17 | End: 2021-06-17 | Stop reason: HOSPADM

## 2021-06-17 RX ORDER — SODIUM CHLORIDE 0.9 % (FLUSH) 0.9 %
10 SYRINGE (ML) INJECTION EVERY 12 HOURS SCHEDULED
Status: DISCONTINUED | OUTPATIENT
Start: 2021-06-17 | End: 2021-06-17 | Stop reason: HOSPADM

## 2021-06-17 RX ADMIN — LIDOCAINE HYDROCHLORIDE 60 MG: 20 INJECTION, SOLUTION INFILTRATION; PERINEURAL at 13:38

## 2021-06-17 RX ADMIN — PROPOFOL 140 MG: 10 INJECTION, EMULSION INTRAVENOUS at 13:38

## 2021-06-17 RX ADMIN — FENTANYL CITRATE 50 MCG: 50 INJECTION INTRAMUSCULAR; INTRAVENOUS at 13:38

## 2021-06-17 RX ADMIN — ACETAMINOPHEN 1000 MG: 500 TABLET ORAL at 12:54

## 2021-06-17 RX ADMIN — DEXAMETHASONE SODIUM PHOSPHATE 4 MG: 4 INJECTION INTRA-ARTICULAR; INTRALESIONAL; INTRAMUSCULAR; INTRAVENOUS; SOFT TISSUE at 13:45

## 2021-06-17 RX ADMIN — OXYCODONE HYDROCHLORIDE 5 MG: 5 TABLET ORAL at 14:41

## 2021-06-17 RX ADMIN — FENTANYL CITRATE 25 MCG: 50 INJECTION INTRAMUSCULAR; INTRAVENOUS at 13:59

## 2021-06-17 RX ADMIN — SODIUM CHLORIDE, POTASSIUM CHLORIDE, SODIUM LACTATE AND CALCIUM CHLORIDE 9 ML/HR: 600; 310; 30; 20 INJECTION, SOLUTION INTRAVENOUS at 12:54

## 2021-06-17 RX ADMIN — MIDAZOLAM HYDROCHLORIDE 2 MG: 1 INJECTION, SOLUTION INTRAMUSCULAR; INTRAVENOUS at 13:15

## 2021-06-17 RX ADMIN — ONDANSETRON 4 MG: 2 INJECTION INTRAMUSCULAR; INTRAVENOUS at 13:58

## 2021-06-17 RX ADMIN — FENTANYL CITRATE 25 MCG: 50 INJECTION INTRAMUSCULAR; INTRAVENOUS at 13:55

## 2021-06-17 RX ADMIN — CEFAZOLIN SODIUM 2 G: 2 INJECTION, SOLUTION INTRAVENOUS at 13:37

## 2021-06-17 NOTE — ANESTHESIA PREPROCEDURE EVALUATION
Anesthesia Evaluation     Patient summary reviewed and Nursing notes reviewed   no history of anesthetic complications:  NPO Solid Status: > 8 hours  NPO Liquid Status: > 2 hours           Airway   Mallampati: II  TM distance: >3 FB  Neck ROM: full  No difficulty expected  Dental    (+) edentulous    Pulmonary - negative pulmonary ROS and normal exam    breath sounds clear to auscultation  Cardiovascular - normal exam  Exercise tolerance: good (4-7 METS)    Rhythm: regular    (+) hyperlipidemia,       Neuro/Psych- negative ROS  GI/Hepatic/Renal/Endo    (+)  GERD,  renal disease stones, diabetes mellitus type 2,     Musculoskeletal (-) negative ROS    Abdominal    Substance History - negative use     OB/GYN negative ob/gyn ROS         Other      history of cancer (h/o Right Breast, lymph nodes resected; no IV Right arm)                    Anesthesia Plan    ASA 2     general   (No kalyani since no IV Right arm  PLAN GA -LMA)  intravenous induction     Anesthetic plan, all risks, benefits, and alternatives have been provided, discussed and informed consent has been obtained with: patient.

## 2021-06-17 NOTE — OP NOTE
FINGER TRIGGER RELEASE  Procedure Report    Patient Name:  Rebecca Mchugh  YOB: 1950    Date of Surgery:  6/17/2021       Pre-op Diagnosis:   RIGHT TRIGGER THUMB       Post-Op Diagnosis Codes:  Same    Procedure/CPT® Codes:      Procedure(s):  RIGHT trigger THUMB RELEASE    Staff:  Surgeon(s):  Eyad Griffith MD    Assistant: Sloan Briseno    Anesthesia: General    Estimated Blood Loss: none    Implants:    Nothing was implanted during the procedure    Specimen:          None      Complications: None    Description of Procedure: See H&P for risks and benefits.The patient was taken to the operating room and placed supine on the operating table.  After [place] block anesthesia was established, the right hand and upper extremity were prepped and draped in standard fashion using alcohol and ChloraPrep.  A standard incision was made just distal to the distal palmar crease of the thumb metacarpal A1 pulley.  Dissection was carried down to the A1 pulley using tenotomies and [place].  A 69 blade was used to release the A1 pulley in its entirety.  The FDS and FDP were pulled free of the pulley.  There was no further triggering.  The wound was copiously irrigated, and the skin was closed with 4-0 horizontal and simple interrupted nylon.  The incision was washed and dried and sterile dressing applied.  The patient tolerated the procedure well and was taken to the recovery room.       Eyad Griffith MD     Date: 6/17/2021  Time: 14:12 EDT

## 2021-06-17 NOTE — ANESTHESIA POSTPROCEDURE EVALUATION
Patient: Rebecca Mchugh    Procedure Summary     Date: 06/17/21 Room / Location: [facility]  / [facility]    Anesthesia Start: 1337 Anesthesia Stop: 1412    Procedure: RIGHT FINGER TRIGGER THUMB RELEASE (Right Fingers) Diagnosis: (RIGHT TRIGGER THUMB)    Surgeons: Eyad Griffith MD Provider: Salomón Durbin MD    Anesthesia Type: general ASA Status: 2          Anesthesia Type: general    Vitals  Vitals Value Taken Time   /71 06/17/21 1424   Temp 36.1 °C (97 °F) 06/17/21 1424   Pulse 87 06/17/21 1434   Resp 12 06/17/21 1424   SpO2 87 % 06/17/21 1434   Vitals shown include unvalidated device data.        Post Anesthesia Care and Evaluation    Patient location during evaluation: bedside  Patient participation: complete - patient participated  Level of consciousness: awake  Pain score: 0  Pain management: adequate  Airway patency: patent  Anesthetic complications: No anesthetic complications  PONV Status: none  Cardiovascular status: acceptable and stable  Respiratory status: acceptable and room air  Hydration status: acceptable

## 2021-06-30 ENCOUNTER — OFFICE VISIT (OUTPATIENT)
Dept: ORTHOPEDIC SURGERY | Facility: CLINIC | Age: 71
End: 2021-06-30

## 2021-06-30 VITALS — HEIGHT: 63 IN | WEIGHT: 151 LBS | BODY MASS INDEX: 26.75 KG/M2

## 2021-06-30 DIAGNOSIS — Z47.89 AFTERCARE FOLLOWING SURGERY OF THE MUSCULOSKELETAL SYSTEM: Primary | ICD-10-CM

## 2021-06-30 PROCEDURE — 99024 POSTOP FOLLOW-UP VISIT: CPT | Performed by: PHYSICIAN ASSISTANT

## 2021-06-30 NOTE — PATIENT INSTRUCTIONS
Patient will continue with HEP at home, continue with care of incision.   Follow up with any changes or concerns.

## 2021-06-30 NOTE — PROGRESS NOTES
"Chief Complaint  Follow-up of the Right Hand and Suture / Staple Removal (trigger thumb release)    Subjective          Rebecca Mchugh presents to [facility] for S/P right trigger thumb release on 06-17-21 by Dr. Griffith. She denies triggering of her thumb. She is pleased with her outcome.     Objective   Vital Signs:   Ht 160 cm (63\")   Wt 68.5 kg (151 lb)   BMI 26.75 kg/m²       Physical Exam  Constitutional:       Appearance: Normal appearance. He is well-developed and normal weight.   HENT:      Head: Normocephalic.      Right Ear: Hearing and external ear normal.      Left Ear: Hearing and external ear normal.      Nose: Nose normal.   Eyes:      Conjunctiva/sclera: Conjunctivae normal.   Cardiovascular:      Rate and Rhythm: Normal rate.   Pulmonary:      Effort: Pulmonary effort is normal.      Breath sounds: No wheezing or rales.   Abdominal:      Palpations: Abdomen is soft.      Tenderness: There is no abdominal tenderness.   Musculoskeletal:      Cervical back: Normal range of motion.   Skin:     Findings: No rash.   Neurological:      Mental Status: He is alert and oriented to person, place, and time.   Psychiatric:         Mood and Affect: Mood and affect normal.         Judgment: Judgment normal.     Ortho Exam  Right Thumb: Well healing incision. No tenderness. Mild swelling of the thumb. Patient able to make a fist. Limited flexion of the thumb. Full AROM of the wrist. N/v intact. Sensation is intact. Radial and ulnar pulse are 2+.     Result Review :   The following data was reviewed by: MICHELLE Wallace on 06/30/2021:         Imaging Results (Most Recent)     None                Assessment and Plan    Problem List Items Addressed This Visit        Musculoskeletal and Injuries    Aftercare following right 1st TFR - Primary          Follow Up   Return if symptoms worsen or fail to improve.  Patient Instructions   Patient will continue with HEP at home, continue with " care of incision.   Follow up with any changes or concerns.     Patient was given instructions and counseling regarding her condition or for health maintenance advice. Please see specific information pulled into the AVS if appropriate.

## 2021-07-15 ENCOUNTER — APPOINTMENT (OUTPATIENT)
Dept: BONE DENSITY | Facility: HOSPITAL | Age: 71
End: 2021-07-15

## 2021-07-15 ENCOUNTER — HOSPITAL ENCOUNTER (OUTPATIENT)
Dept: MAMMOGRAPHY | Facility: HOSPITAL | Age: 71
Discharge: HOME | End: 2021-07-15
Admitting: NURSE PRACTITIONER

## 2021-07-15 VITALS — WEIGHT: 158 LBS | BODY MASS INDEX: 28 KG/M2 | HEIGHT: 63 IN

## 2021-07-15 DIAGNOSIS — Z12.39 SCREENING BREAST EXAMINATION: ICD-10-CM

## 2021-07-15 PROCEDURE — 77067 SCR MAMMO BI INCL CAD: CPT

## 2021-07-15 PROCEDURE — 77063 BREAST TOMOSYNTHESIS BI: CPT

## 2021-09-14 ENCOUNTER — TRANSCRIBE ORDERS (OUTPATIENT)
Dept: ADMINISTRATIVE | Facility: HOSPITAL | Age: 71
End: 2021-09-14

## 2021-09-14 DIAGNOSIS — Z13.6 SCREENING FOR ISCHEMIC HEART DISEASE: ICD-10-CM

## 2021-09-14 DIAGNOSIS — Q78.2 OSTEOPETROSIS: Primary | ICD-10-CM

## 2021-09-14 DIAGNOSIS — E11.65 TYPE II DIABETES MELLITUS WITH HYPEROSMOLARITY, UNCONTROLLED (HCC): ICD-10-CM

## 2021-09-14 DIAGNOSIS — E78.5 HYPERLIPIDEMIA, UNSPECIFIED HYPERLIPIDEMIA TYPE: ICD-10-CM

## 2021-09-14 DIAGNOSIS — E11.00 TYPE II DIABETES MELLITUS WITH HYPEROSMOLARITY, UNCONTROLLED (HCC): ICD-10-CM

## 2021-09-24 ENCOUNTER — APPOINTMENT (OUTPATIENT)
Dept: INFUSION THERAPY | Facility: HOSPITAL | Age: 71
End: 2021-09-24

## 2021-09-28 PROBLEM — M81.0 OSTEOPOROSIS: Status: ACTIVE | Noted: 2021-09-28

## 2021-10-01 ENCOUNTER — HOSPITAL ENCOUNTER (OUTPATIENT)
Dept: ULTRASOUND IMAGING | Facility: HOSPITAL | Age: 71
Discharge: HOME | End: 2021-10-01
Admitting: NURSE PRACTITIONER

## 2021-10-01 DIAGNOSIS — E78.5 HYPERLIPIDEMIA, UNSPECIFIED HYPERLIPIDEMIA TYPE: ICD-10-CM

## 2021-10-01 DIAGNOSIS — E11.00 TYPE II DIABETES MELLITUS WITH HYPEROSMOLARITY, UNCONTROLLED (HCC): ICD-10-CM

## 2021-10-01 DIAGNOSIS — E11.65 TYPE II DIABETES MELLITUS WITH HYPEROSMOLARITY, UNCONTROLLED (HCC): ICD-10-CM

## 2021-10-01 DIAGNOSIS — Z13.6 SCREENING FOR ISCHEMIC HEART DISEASE: ICD-10-CM

## 2021-10-01 PROCEDURE — 76706 US ABDL AORTA SCREEN AAA: CPT

## 2022-03-24 ENCOUNTER — TRANSCRIBE ORDERS (OUTPATIENT)
Dept: ADMINISTRATIVE | Facility: HOSPITAL | Age: 72
End: 2022-03-24

## 2022-03-24 DIAGNOSIS — Z12.31 VISIT FOR SCREENING MAMMOGRAM: Primary | ICD-10-CM

## 2022-04-28 ENCOUNTER — APPOINTMENT (OUTPATIENT)
Dept: MAMMOGRAPHY | Facility: HOSPITAL | Age: 72
End: 2022-04-28

## 2022-05-03 ENCOUNTER — HOSPITAL ENCOUNTER (OUTPATIENT)
Dept: MAMMOGRAPHY | Facility: HOSPITAL | Age: 72
Discharge: HOME | End: 2022-05-03
Admitting: NURSE PRACTITIONER

## 2022-05-03 DIAGNOSIS — Z12.31 VISIT FOR SCREENING MAMMOGRAM: ICD-10-CM

## 2022-05-03 PROCEDURE — 77067 SCR MAMMO BI INCL CAD: CPT

## 2022-05-03 PROCEDURE — 77063 BREAST TOMOSYNTHESIS BI: CPT

## 2022-05-04 NOTE — DISCHARGE INSTRUCTIONS
? For your surgery you had:  ? General anesthesia (you may have a sore throat for the first 24 hours)  ? IV sedation  ? You may experience dizziness, drowsiness, or lightheadedness for several hours following surgery.  ? Do not stay alone today or tonight.  ? Limit your activity for 24 hours.  ? Resume your diet slowly.    ? You should not drive or operate machinery, drink alcohol, or sign legally binding documents for 24 hours or while you are taking pain medication.  ? If you had an axillary or regional block, you may not have control of the involved limb for up to 12 hours. Protect the arm with a sling or follow your physician's specific instructions. Carry the upper arm in a sling so that the hand and wrist are above the level of the heart. Elevate affected arm on a pillow when resting.   ? Use ice to affected area for 48-72 hours. Apply 20 minutes on - 20 minutes off. Do NOT apply directly to skin.  ? Exercise fingers frequently by making a full fist and fully straightening the fingers. This will help prevent swelling and stiffness.  ? Do NOT do any heavy lifting, pulling or strenuous activities using the affected hand. []  [] Remove gauze in  48 hours    .  [] Do NOT submerge in water.  [] Keep incision area clean and dry.  [] You may shower or bathe in keep dressing dry for 2 days then remove rinse incision with water  Band aid daily may change as often as needed do not soak hand pat dry     .  NOTIFY YOUR DOCTOR IF YOU EXPERIENCE ANY OF THE FOLLOWING:  ? Temperature greater than 101 degrees Fahrenheit  ? Shaking Chills  ? Redness or excessive drainage from incision  ? Nausea, vomiting and/or pain that is not controlled by prescribed medications  ? Increase in bleeding or bleeding that is excessive  ? Unable to urinate in 6 hours after surgery  ? If unable to reach your doctor, please go to the closest Emergency Room  Last dose of pain medication was given at:   .  Y    SPECIAL INSTRUCTIONS:  May take  Patient positioned supine. Patient prepped and draped per unit standard.    Safety straps applied:N/A   antonino or aleve  if needed with the prescription pain meds              I have read and received the above instructions.

## 2022-05-12 ENCOUNTER — HOSPITAL ENCOUNTER (EMERGENCY)
Facility: HOSPITAL | Age: 72
Discharge: HOME | End: 2022-05-12
Attending: EMERGENCY MEDICINE | Admitting: EMERGENCY MEDICINE

## 2022-05-12 ENCOUNTER — APPOINTMENT (OUTPATIENT)
Dept: GENERAL RADIOLOGY | Facility: HOSPITAL | Age: 72
End: 2022-05-12

## 2022-05-12 VITALS
DIASTOLIC BLOOD PRESSURE: 68 MMHG | HEIGHT: 63 IN | RESPIRATION RATE: 18 BRPM | TEMPERATURE: 98.7 F | WEIGHT: 147.05 LBS | SYSTOLIC BLOOD PRESSURE: 130 MMHG | OXYGEN SATURATION: 99 % | HEART RATE: 99 BPM | BODY MASS INDEX: 26.05 KG/M2

## 2022-05-12 DIAGNOSIS — M51.36 DEGENERATIVE DISC DISEASE, LUMBAR: ICD-10-CM

## 2022-05-12 DIAGNOSIS — S32.020A CLOSED WEDGE COMPRESSION FRACTURE OF L2 VERTEBRA, INITIAL ENCOUNTER: ICD-10-CM

## 2022-05-12 DIAGNOSIS — M54.42 ACUTE LEFT-SIDED LOW BACK PAIN WITH LEFT-SIDED SCIATICA: Primary | ICD-10-CM

## 2022-05-12 PROCEDURE — 72100 X-RAY EXAM L-S SPINE 2/3 VWS: CPT

## 2022-05-12 PROCEDURE — 96372 THER/PROPH/DIAG INJ SC/IM: CPT

## 2022-05-12 PROCEDURE — 25010000002 MORPHINE PER 10 MG: Performed by: EMERGENCY MEDICINE

## 2022-05-12 PROCEDURE — 99282 EMERGENCY DEPT VISIT SF MDM: CPT

## 2022-05-12 PROCEDURE — 25010000002 METHYLPREDNISOLONE PER 125 MG: Performed by: EMERGENCY MEDICINE

## 2022-05-12 RX ORDER — HYDROCODONE BITARTRATE AND ACETAMINOPHEN 5; 325 MG/1; MG/1
1 TABLET ORAL 4 TIMES DAILY PRN
Qty: 12 TABLET | Refills: 0 | Status: CANCELLED | OUTPATIENT
Start: 2022-05-12

## 2022-05-12 RX ORDER — HYDROCODONE BITARTRATE AND ACETAMINOPHEN 5; 325 MG/1; MG/1
1 TABLET ORAL 4 TIMES DAILY PRN
Qty: 12 TABLET | Refills: 0 | Status: ON HOLD | OUTPATIENT
Start: 2022-05-12 | End: 2023-02-08

## 2022-05-12 RX ORDER — PREDNISONE 20 MG/1
40 TABLET ORAL DAILY
Qty: 10 TABLET | Refills: 0 | Status: ON HOLD | OUTPATIENT
Start: 2022-05-12 | End: 2023-02-08

## 2022-05-12 RX ORDER — METHYLPREDNISOLONE SODIUM SUCCINATE 125 MG/2ML
125 INJECTION, POWDER, LYOPHILIZED, FOR SOLUTION INTRAMUSCULAR; INTRAVENOUS ONCE
Status: COMPLETED | OUTPATIENT
Start: 2022-05-12 | End: 2022-05-12

## 2022-05-12 RX ORDER — LIDOCAINE 50 MG/G
1 PATCH TOPICAL EVERY 24 HOURS
Qty: 30 EACH | Refills: 0 | Status: SHIPPED | OUTPATIENT
Start: 2022-05-12

## 2022-05-12 RX ADMIN — MORPHINE SULFATE 4 MG: 4 INJECTION, SOLUTION INTRAMUSCULAR; INTRAVENOUS at 11:15

## 2022-05-12 RX ADMIN — METHYLPREDNISOLONE SODIUM SUCCINATE 125 MG: 125 INJECTION, POWDER, FOR SOLUTION INTRAMUSCULAR; INTRAVENOUS at 11:16

## 2022-05-12 NOTE — DISCHARGE INSTRUCTIONS
Follow-up with your primary care provider, discussed possible outpatient physical therapy for lower spine and or outpatient MRI of spine and follow-up with Dr. Booker's office.    No heat to lower back.  Just utilize an ice pack 4-5 times a day 15 to 20 minutes at a time.    No lifting greater than 5 to 10 pounds.    No prolonged standing or sitting.

## 2022-05-12 NOTE — ED PROVIDER NOTES
Subjective   Patient complaining of left leg pain over the last several days.  Patient denies any known injury.  Patient she does have a history of sciatica and pain feels the same.  Patient states she also has a history of bulging disks in her lower back.  Pain states pain seems to be radiating from lower back to left knee.  Patient states pain feels worse with ambulation.  Patient denies any additional symptoms or concerns at this time.      History provided by:  Patient   used: No    Leg Pain  Location:  Leg  Time since incident: Several days.  Injury: no    Leg location:  L leg  Pain details:     Quality:  Aching and shooting    Radiates to:  L leg (From left hip to left knee.)    Severity:  Moderate    Onset quality:  Gradual    Duration: With last several days.    Timing:  Constant    Progression:  Waxing and waning  Chronicity:  New  Dislocation: no    Relieved by:  Nothing  Worsened by:  Bearing weight and activity  Associated symptoms: no back pain, no decreased ROM, no fatigue, no fever, no itching, no muscle weakness, no neck pain, no numbness, no stiffness, no swelling and no tingling        Review of Systems   Constitutional: Negative for chills, fatigue and fever.   HENT: Negative for congestion, ear pain and sore throat.    Eyes: Negative for pain.   Respiratory: Negative for cough, chest tightness and shortness of breath.    Cardiovascular: Negative for chest pain.   Gastrointestinal: Negative for abdominal pain, diarrhea, nausea and vomiting.   Genitourinary: Negative for flank pain and hematuria.   Musculoskeletal: Negative for back pain, joint swelling, neck pain and stiffness.        Patient planing of left-sided sciatic leg pain has been worsening over the last couple days.  Patient states that pain seems to be radiating from left hip to left knee.  Patient states she does have a history of sciatica and pain seems to be the same.  Patient states she also has a history of a  bulging disks in her lower back.  Patient denies any injury.   Skin: Negative for itching and pallor.   Neurological: Negative for seizures and headaches.   Psychiatric/Behavioral: Negative.    All other systems reviewed and are negative.      Past Medical History:   Diagnosis Date   • Acid reflux    • Ankle fracture 2020   • Bladder disorder    • Breast cancer (HCC)     Right   • Cancer (HCC)    • Colitis    • Diabetes (HCC)    • Diabetes mellitus (HCC)    • GERD (gastroesophageal reflux disease)    • History of spinal fracture 11/2020   • Hyperlipemia    • Recurrent UTI (urinary tract infection)    • Renal calculus or stone    • Seasonal allergies    • Trigger thumb of right hand 6/17/2021   • Urinary incontinence in female        Allergies   Allergen Reactions   • [place] Hives       Past Surgical History:   Procedure Laterality Date   • ABDOMINAL HYSTERECTOMY     • BACK SURGERY      HERNIATED DISCS  X3 OR 4 TIMES    • BLADDER REPAIR  2002   • BREAST BIOPSY     • BREAST LUMPECTOMY Right     breast mass   • CHOLECYSTECTOMY     • COLONOSCOPY     • CYSTOSCOPY      with dilation   • GALLBLADDER SURGERY     • INTERSTIM PLACEMENT  2018-19?   • INTRAOCULAR LENS INSERTION     • TRIGGER FINGER RELEASE Right 6/17/2021    Procedure: RIGHT FINGER TRIGGER THUMB RELEASE;  Surgeon: Eyad Griffith MD;  [facility]: [facility] OR [facility];  Service: Orthopedics;  Laterality: Right;   • VAGINAL MESH REVISION      vaginal approach per Dr. Liao       Family History   Problem Relation Age of Onset   • Cancer Mother         unspecified   • Breast cancer Mother         30s   • Heart disease Brother    • Cancer Brother         unspecified   • Diabetes Brother         unspecified type   • Malig Hyperthermia Neg Hx        Social History     Socioeconomic History   • Marital status:    • Number of children: 2   Tobacco Use   • Smoking status: Former Smoker     Packs/day: 1.00     Types: Cigarettes   • Tobacco comment: quit smoking at age  48   Substance and Sexual Activity   • Alcohol use: Never   • Drug use: Never           Objective   Physical Exam  Vitals and nursing note reviewed.   Constitutional:       General: She is not in acute distress.     Appearance: Normal appearance. She is not ill-appearing, toxic-appearing or diaphoretic.   HENT:      Head: Normocephalic and atraumatic.      Mouth/Throat:      Mouth: Mucous membranes are moist.   Eyes:      General: No scleral icterus.  Cardiovascular:      Rate and Rhythm: Normal rate and regular rhythm.      Pulses: Normal pulses.      Heart sounds: Normal heart sounds.   Pulmonary:      Effort: Pulmonary effort is normal. No respiratory distress.      Breath sounds: Normal breath sounds.   Abdominal:      General: Abdomen is flat.      Palpations: Abdomen is soft.      Tenderness: There is no abdominal tenderness.   Musculoskeletal:         General: Normal range of motion.      Cervical back: Normal range of motion and neck supple.        Legs:    Skin:     General: Skin is warm and dry.   Neurological:      General: No focal deficit present.      Mental Status: She is alert and oriented to person, place, and time. Mental status is at baseline.   Psychiatric:         Mood and Affect: Mood normal.         Behavior: Behavior normal.         Thought Content: Thought content normal.         Judgment: Judgment normal.         Procedures           ED Course                                                 MDM  Number of Diagnoses or Management Options  Diagnosis management comments: Negative straight leg raise on the right  Negative straight leg raise on the left  BLE strength equal and strong  Sensation intact to BLE with light touch.  No saddle paresthesias    I have spoken with patient. I have explained the patient´s condition, diagnoses and treatment plan based on the information available to me at this time. I have answered the patient's questions and addressed any concerns. The patient has a good   understanding of the patient´s diagnosis, condition, and treatment plan as can be expected at this point. The vital signs have been stable. The patient´s condition is stable and appropriate for discharge from the emergency department.      The patient will pursue further outpatient evaluation with the primary care physician or other designated or consulting physician as outlined in the discharge instructions. They are agreeable to this plan of care and follow-up instructions have been explained in detail. The patient has received these instructions in written format and have expressed an understanding of the discharge instructions. The patient is aware that any significant change in condition or worsening of symptoms should prompt an immediate return to this or the closest emergency department or call to 911.       Amount and/or Complexity of Data Reviewed  Tests in the radiology section of CPT®: ordered and reviewed    Risk of Complications, Morbidity, and/or Mortality  Presenting problems: moderate  Diagnostic procedures: low  Management options: low    Patient Progress  Patient progress: stable      Final diagnoses:   Closed wedge compression fracture of L2 vertebra, initial encounter (HCC)   Degenerative disc disease, lumbar   Acute left-sided low back pain with left-sided sciatica       ED Disposition  ED Disposition     ED Disposition   Discharge    Condition   Stable    Comment   --             Heather Marquez, TITO  [address] [facility] [address] [phone]    In 3 days      Chema Booker MD  [address]  [place] [address] [phone]      Talk to your PCM about follow-up appointment for evaluation of lower back pain.         Medication List      New Prescriptions    HYDROcodone-acetaminophen 5-325 MG per tablet  Commonly known as: NORCO  Take 1 tablet by mouth 4 (Four) Times a Day As Needed for Moderate Pain .     lidocaine 5 %  Commonly known as: Lidoderm  Place 1  patch on the skin as directed by provider Daily. Remove & Discard patch within 12 hours or as directed by MD     predniSONE 20 MG tablet  Commonly known as: DELTASONE  Take 2 tablets by mouth Daily.           Where to Get Your Medications      These medications were sent to [facility] - [place], [place] - [address] [facility] - [phone]  - [phone] FX  [address] [place] [place] [address]    Phone: [phone]   · HYDROcodone-acetaminophen 5-325 MG per tablet  · lidocaine 5 %  · predniSONE 20 MG tablet          Golden Clark, TITO  05/12/22 9139

## 2022-07-07 NOTE — PRE-PROCEDURE INSTRUCTIONS
Patient educated to remain  Npo after midnight.  Wash with hibiclens soap in am.  May take omeprazole with sips of water.   Problem: Chronic Conditions and Co-morbidities  Goal: Patient's chronic conditions and co-morbidity symptoms are monitored and maintained or improved  7/6/2022 2218 by Traci Rowell RN  Outcome: Progressing  Flowsheets (Taken 7/6/2022 1950)  Care Plan - Patient's Chronic Conditions and Co-Morbidity Symptoms are Monitored and Maintained or Improved: Monitor and assess patient's chronic conditions and comorbid symptoms for stability, deterioration, or improvement  7/6/2022 1030 by Phan Coker RN  Outcome: Progressing  Flowsheets (Taken 7/6/2022 0804)  Care Plan - Patient's Chronic Conditions and Co-Morbidity Symptoms are Monitored and Maintained or Improved: Monitor and assess patient's chronic conditions and comorbid symptoms for stability, deterioration, or improvement     Problem: Pain  Goal: Verbalizes/displays adequate comfort level or baseline comfort level  7/6/2022 2218 by Traci Rowell RN  Outcome: Progressing  7/6/2022 1030 by Phan Coker RN  Outcome: Progressing     Problem: Discharge Planning  Goal: Discharge to home or other facility with appropriate resources  7/6/2022 2218 by Traci Rowell RN  Outcome: Progressing  Flowsheets (Taken 7/6/2022 1950)  Discharge to home or other facility with appropriate resources: Identify barriers to discharge with patient and caregiver  7/6/2022 1030 by Phan Coker RN  Outcome: Progressing  Flowsheets (Taken 7/6/2022 0804)  Discharge to home or other facility with appropriate resources: Identify barriers to discharge with patient and caregiver

## 2023-02-07 ENCOUNTER — HOSPITAL ENCOUNTER (INPATIENT)
Facility: HOSPITAL | Age: 73
LOS: 1 days | Discharge: HOME | DRG: 86 | End: 2023-02-10
Attending: EMERGENCY MEDICINE | Admitting: FAMILY MEDICINE
Payer: MEDICARE

## 2023-02-07 ENCOUNTER — APPOINTMENT (OUTPATIENT)
Dept: GENERAL RADIOLOGY | Facility: HOSPITAL | Age: 73
DRG: 86 | End: 2023-02-07
Payer: MEDICARE

## 2023-02-07 ENCOUNTER — APPOINTMENT (OUTPATIENT)
Dept: CT IMAGING | Facility: HOSPITAL | Age: 73
DRG: 86 | End: 2023-02-07
Payer: MEDICARE

## 2023-02-07 DIAGNOSIS — I62.00 SUBDURAL HEMORRHAGE: Primary | ICD-10-CM

## 2023-02-07 DIAGNOSIS — Z78.9 DECREASED ACTIVITIES OF DAILY LIVING (ADL): ICD-10-CM

## 2023-02-07 DIAGNOSIS — S06.5XAA SUBDURAL HEMATOMA: ICD-10-CM

## 2023-02-07 DIAGNOSIS — R26.2 DIFFICULTY WALKING: ICD-10-CM

## 2023-02-07 LAB
ALBUMIN SERPL-MCNC: 4.3 G/DL (ref 3.5–5.2)
ALBUMIN/GLOB SERPL: 1.4 G/DL
ALP SERPL-CCNC: 126 U/L (ref 39–117)
ALT SERPL W P-5'-P-CCNC: 30 U/L (ref 1–33)
ANION GAP SERPL CALCULATED.3IONS-SCNC: 11.5 MMOL/L (ref 5–15)
AST SERPL-CCNC: 27 U/L (ref 1–32)
BASOPHILS # BLD AUTO: 0.04 10*3/MM3 (ref 0–0.2)
BASOPHILS NFR BLD AUTO: 0.8 % (ref 0–1.5)
BILIRUB SERPL-MCNC: 0.4 MG/DL (ref 0–1.2)
BUN SERPL-MCNC: 12 MG/DL (ref 8–23)
BUN/CREAT SERPL: 10.1 (ref 7–25)
CALCIUM SPEC-SCNC: 9.1 MG/DL (ref 8.6–10.5)
CHLORIDE SERPL-SCNC: 93 MMOL/L (ref 98–107)
CO2 SERPL-SCNC: 24.5 MMOL/L (ref 22–29)
CREAT SERPL-MCNC: 1.19 MG/DL (ref 0.57–1)
DEPRECATED RDW RBC AUTO: 44.8 FL (ref 37–54)
EGFRCR SERPLBLD CKD-EPI 2021: 48.1 ML/MIN/1.73
EOSINOPHIL # BLD AUTO: 0.18 10*3/MM3 (ref 0–0.4)
EOSINOPHIL NFR BLD AUTO: 3.4 % (ref 0.3–6.2)
ERYTHROCYTE [DISTWIDTH] IN BLOOD BY AUTOMATED COUNT: 14.2 % (ref 12.3–15.4)
GEN 5 2HR TROPONIN T REFLEX: 10 NG/L
GLOBULIN UR ELPH-MCNC: 3 GM/DL
GLUCOSE BLDC GLUCOMTR-MCNC: 153 MG/DL (ref 70–99)
GLUCOSE SERPL-MCNC: 151 MG/DL (ref 65–99)
HCT VFR BLD AUTO: 33.4 % (ref 34–46.6)
HGB BLD-MCNC: 11.1 G/DL (ref 12–15.9)
HOLD SPECIMEN: NORMAL
HOLD SPECIMEN: NORMAL
IMM GRANULOCYTES # BLD AUTO: 0.02 10*3/MM3 (ref 0–0.05)
IMM GRANULOCYTES NFR BLD AUTO: 0.4 % (ref 0–0.5)
LYMPHOCYTES # BLD AUTO: 0.9 10*3/MM3 (ref 0.7–3.1)
LYMPHOCYTES NFR BLD AUTO: 17 % (ref 19.6–45.3)
MAGNESIUM SERPL-MCNC: 1.5 MG/DL (ref 1.6–2.4)
MCH RBC QN AUTO: 28.7 PG (ref 26.6–33)
MCHC RBC AUTO-ENTMCNC: 33.2 G/DL (ref 31.5–35.7)
MCV RBC AUTO: 86.3 FL (ref 79–97)
MONOCYTES # BLD AUTO: 0.48 10*3/MM3 (ref 0.1–0.9)
MONOCYTES NFR BLD AUTO: 9.1 % (ref 5–12)
NEUTROPHILS NFR BLD AUTO: 3.68 10*3/MM3 (ref 1.7–7)
NEUTROPHILS NFR BLD AUTO: 69.3 % (ref 42.7–76)
NRBC BLD AUTO-RTO: 0 /100 WBC (ref 0–0.2)
PLATELET # BLD AUTO: 264 10*3/MM3 (ref 140–450)
PMV BLD AUTO: 9.1 FL (ref 6–12)
POTASSIUM SERPL-SCNC: 4 MMOL/L (ref 3.5–5.2)
PROT SERPL-MCNC: 7.3 G/DL (ref 6–8.5)
RBC # BLD AUTO: 3.87 10*6/MM3 (ref 3.77–5.28)
SODIUM SERPL-SCNC: 129 MMOL/L (ref 136–145)
TROPONIN T DELTA: 2 NG/L
TROPONIN T SERPL HS-MCNC: 8 NG/L
WBC NRBC COR # BLD: 5.3 10*3/MM3 (ref 3.4–10.8)
WHOLE BLOOD HOLD COAG: NORMAL
WHOLE BLOOD HOLD SPECIMEN: NORMAL

## 2023-02-07 PROCEDURE — 82962 GLUCOSE BLOOD TEST: CPT

## 2023-02-07 PROCEDURE — 93005 ELECTROCARDIOGRAM TRACING: CPT | Performed by: EMERGENCY MEDICINE

## 2023-02-07 PROCEDURE — 80053 COMPREHEN METABOLIC PANEL: CPT | Performed by: EMERGENCY MEDICINE

## 2023-02-07 PROCEDURE — G0378 HOSPITAL OBSERVATION PER HR: HCPCS

## 2023-02-07 PROCEDURE — 72125 CT NECK SPINE W/O DYE: CPT

## 2023-02-07 PROCEDURE — 99223 1ST HOSP IP/OBS HIGH 75: CPT | Performed by: INTERNAL MEDICINE

## 2023-02-07 PROCEDURE — 70450 CT HEAD/BRAIN W/O DYE: CPT

## 2023-02-07 PROCEDURE — 84484 ASSAY OF TROPONIN QUANT: CPT | Performed by: EMERGENCY MEDICINE

## 2023-02-07 PROCEDURE — 71045 X-RAY EXAM CHEST 1 VIEW: CPT

## 2023-02-07 PROCEDURE — 83735 ASSAY OF MAGNESIUM: CPT | Performed by: EMERGENCY MEDICINE

## 2023-02-07 PROCEDURE — 85025 COMPLETE CBC W/AUTO DIFF WBC: CPT | Performed by: EMERGENCY MEDICINE

## 2023-02-07 PROCEDURE — 99285 EMERGENCY DEPT VISIT HI MDM: CPT

## 2023-02-07 RX ORDER — SODIUM CHLORIDE 0.9 % (FLUSH) 0.9 %
10 SYRINGE (ML) INJECTION AS NEEDED
Status: DISCONTINUED | OUTPATIENT
Start: 2023-02-07 | End: 2023-02-10 | Stop reason: HOSPADM

## 2023-02-07 RX ORDER — SODIUM CHLORIDE 0.9 % (FLUSH) 0.9 %
10 SYRINGE (ML) INJECTION EVERY 12 HOURS SCHEDULED
Status: DISCONTINUED | OUTPATIENT
Start: 2023-02-07 | End: 2023-02-10 | Stop reason: HOSPADM

## 2023-02-07 RX ORDER — MAGNESIUM SULFATE HEPTAHYDRATE 40 MG/ML
2 INJECTION, SOLUTION INTRAVENOUS ONCE
Status: COMPLETED | OUTPATIENT
Start: 2023-02-07 | End: 2023-02-08

## 2023-02-07 RX ORDER — SODIUM CHLORIDE 9 MG/ML
40 INJECTION, SOLUTION INTRAVENOUS AS NEEDED
Status: DISCONTINUED | OUTPATIENT
Start: 2023-02-07 | End: 2023-02-10 | Stop reason: HOSPADM

## 2023-02-07 RX ORDER — SODIUM CHLORIDE 9 MG/ML
100 INJECTION, SOLUTION INTRAVENOUS CONTINUOUS
Status: ACTIVE | OUTPATIENT
Start: 2023-02-07 | End: 2023-02-08

## 2023-02-07 RX ADMIN — SODIUM CHLORIDE 5 MG/HR: 9 INJECTION, SOLUTION INTRAVENOUS at 22:50

## 2023-02-08 ENCOUNTER — APPOINTMENT (OUTPATIENT)
Dept: CT IMAGING | Facility: HOSPITAL | Age: 73
DRG: 86 | End: 2023-02-08
Payer: MEDICARE

## 2023-02-08 LAB
ANION GAP SERPL CALCULATED.3IONS-SCNC: 9.6 MMOL/L (ref 5–15)
BUN SERPL-MCNC: 10 MG/DL (ref 8–23)
BUN/CREAT SERPL: 9.3 (ref 7–25)
CALCIUM SPEC-SCNC: 8.6 MG/DL (ref 8.6–10.5)
CHLORIDE SERPL-SCNC: 100 MMOL/L (ref 98–107)
CHOLEST SERPL-MCNC: 130 MG/DL (ref 0–200)
CO2 SERPL-SCNC: 23.4 MMOL/L (ref 22–29)
CREAT SERPL-MCNC: 1.08 MG/DL (ref 0.57–1)
DEPRECATED RDW RBC AUTO: 43 FL (ref 37–54)
EGFRCR SERPLBLD CKD-EPI 2021: 54 ML/MIN/1.73
ERYTHROCYTE [DISTWIDTH] IN BLOOD BY AUTOMATED COUNT: 14 % (ref 12.3–15.4)
GLUCOSE BLDC GLUCOMTR-MCNC: 111 MG/DL (ref 70–99)
GLUCOSE BLDC GLUCOMTR-MCNC: 115 MG/DL (ref 70–99)
GLUCOSE BLDC GLUCOMTR-MCNC: 133 MG/DL (ref 70–99)
GLUCOSE BLDC GLUCOMTR-MCNC: 95 MG/DL (ref 70–99)
GLUCOSE SERPL-MCNC: 137 MG/DL (ref 65–99)
HBA1C MFR BLD: 7.5 % (ref 4.8–5.6)
HCT VFR BLD AUTO: 30.6 % (ref 34–46.6)
HDLC SERPL-MCNC: 35 MG/DL (ref 40–60)
HGB BLD-MCNC: 10.3 G/DL (ref 12–15.9)
LDLC SERPL CALC-MCNC: 72 MG/DL (ref 0–100)
LDLC/HDLC SERPL: 1.99 {RATIO}
MAGNESIUM SERPL-MCNC: 2.5 MG/DL (ref 1.6–2.4)
MCH RBC QN AUTO: 28.4 PG (ref 26.6–33)
MCHC RBC AUTO-ENTMCNC: 33.7 G/DL (ref 31.5–35.7)
MCV RBC AUTO: 84.3 FL (ref 79–97)
NT-PROBNP SERPL-MCNC: 154.5 PG/ML (ref 0–900)
PHOSPHATE SERPL-MCNC: 3.2 MG/DL (ref 2.5–4.5)
PLATELET # BLD AUTO: 248 10*3/MM3 (ref 140–450)
PMV BLD AUTO: 9.1 FL (ref 6–12)
POTASSIUM SERPL-SCNC: 3.9 MMOL/L (ref 3.5–5.2)
RBC # BLD AUTO: 3.63 10*6/MM3 (ref 3.77–5.28)
SODIUM SERPL-SCNC: 133 MMOL/L (ref 136–145)
TRIGL SERPL-MCNC: 127 MG/DL (ref 0–150)
VLDLC SERPL-MCNC: 23 MG/DL (ref 5–40)
WBC NRBC COR # BLD: 5.14 10*3/MM3 (ref 3.4–10.8)

## 2023-02-08 PROCEDURE — 83735 ASSAY OF MAGNESIUM: CPT | Performed by: INTERNAL MEDICINE

## 2023-02-08 PROCEDURE — 97165 OT EVAL LOW COMPLEX 30 MIN: CPT

## 2023-02-08 PROCEDURE — 99291 CRITICAL CARE FIRST HOUR: CPT | Performed by: STUDENT IN AN ORGANIZED HEALTH CARE EDUCATION/TRAINING PROGRAM

## 2023-02-08 PROCEDURE — G0378 HOSPITAL OBSERVATION PER HR: HCPCS

## 2023-02-08 PROCEDURE — 85027 COMPLETE CBC AUTOMATED: CPT | Performed by: INTERNAL MEDICINE

## 2023-02-08 PROCEDURE — 70450 CT HEAD/BRAIN W/O DYE: CPT

## 2023-02-08 PROCEDURE — 97161 PT EVAL LOW COMPLEX 20 MIN: CPT

## 2023-02-08 PROCEDURE — 99203 OFFICE O/P NEW LOW 30 MIN: CPT | Performed by: NEUROLOGICAL SURGERY

## 2023-02-08 PROCEDURE — 83880 ASSAY OF NATRIURETIC PEPTIDE: CPT | Performed by: STUDENT IN AN ORGANIZED HEALTH CARE EDUCATION/TRAINING PROGRAM

## 2023-02-08 PROCEDURE — 83036 HEMOGLOBIN GLYCOSYLATED A1C: CPT | Performed by: INTERNAL MEDICINE

## 2023-02-08 PROCEDURE — 82962 GLUCOSE BLOOD TEST: CPT

## 2023-02-08 PROCEDURE — 84100 ASSAY OF PHOSPHORUS: CPT | Performed by: PHYSICIAN ASSISTANT

## 2023-02-08 PROCEDURE — 92610 EVALUATE SWALLOWING FUNCTION: CPT

## 2023-02-08 PROCEDURE — 25010000002 MAGNESIUM SULFATE 2 GM/50ML SOLUTION: Performed by: INTERNAL MEDICINE

## 2023-02-08 PROCEDURE — 80048 BASIC METABOLIC PNL TOTAL CA: CPT | Performed by: INTERNAL MEDICINE

## 2023-02-08 PROCEDURE — 99232 SBSQ HOSP IP/OBS MODERATE 35: CPT | Performed by: FAMILY MEDICINE

## 2023-02-08 PROCEDURE — 36415 COLL VENOUS BLD VENIPUNCTURE: CPT | Performed by: INTERNAL MEDICINE

## 2023-02-08 PROCEDURE — 80061 LIPID PANEL: CPT | Performed by: INTERNAL MEDICINE

## 2023-02-08 RX ORDER — BISACODYL 10 MG
10 SUPPOSITORY, RECTAL RECTAL DAILY PRN
Status: DISCONTINUED | OUTPATIENT
Start: 2023-02-08 | End: 2023-02-10 | Stop reason: HOSPADM

## 2023-02-08 RX ORDER — OLOPATADINE HYDROCHLORIDE 1 MG/ML
1 SOLUTION/ DROPS OPHTHALMIC 2 TIMES DAILY
COMMUNITY

## 2023-02-08 RX ORDER — HYDROCODONE BITARTRATE AND ACETAMINOPHEN 7.5; 325 MG/1; MG/1
1 TABLET ORAL EVERY 6 HOURS PRN
COMMUNITY

## 2023-02-08 RX ORDER — PANTOPRAZOLE SODIUM 40 MG/1
40 TABLET, DELAYED RELEASE ORAL
Status: DISCONTINUED | OUTPATIENT
Start: 2023-02-08 | End: 2023-02-10 | Stop reason: HOSPADM

## 2023-02-08 RX ORDER — AMLODIPINE BESYLATE 5 MG/1
5 TABLET ORAL ONCE
Status: COMPLETED | OUTPATIENT
Start: 2023-02-08 | End: 2023-02-08

## 2023-02-08 RX ORDER — TRAZODONE HYDROCHLORIDE 100 MG/1
100 TABLET ORAL NIGHTLY
COMMUNITY

## 2023-02-08 RX ORDER — AMOXICILLIN 250 MG
2 CAPSULE ORAL 2 TIMES DAILY
Status: DISCONTINUED | OUTPATIENT
Start: 2023-02-08 | End: 2023-02-10 | Stop reason: HOSPADM

## 2023-02-08 RX ORDER — SULFAMETHOXAZOLE AND TRIMETHOPRIM 800; 160 MG/1; MG/1
1 TABLET ORAL 2 TIMES DAILY
COMMUNITY
Start: 2023-02-06

## 2023-02-08 RX ORDER — ACETAMINOPHEN 325 MG/1
650 TABLET ORAL EVERY 6 HOURS PRN
Status: DISCONTINUED | OUTPATIENT
Start: 2023-02-08 | End: 2023-02-10 | Stop reason: HOSPADM

## 2023-02-08 RX ORDER — METFORMIN HYDROCHLORIDE 500 MG/1
500 TABLET, EXTENDED RELEASE ORAL 2 TIMES DAILY
COMMUNITY

## 2023-02-08 RX ORDER — LUBIPROSTONE 24 UG/1
24 CAPSULE ORAL 2 TIMES DAILY WITH MEALS
COMMUNITY

## 2023-02-08 RX ORDER — AMLODIPINE BESYLATE 5 MG/1
5 TABLET ORAL
Status: DISCONTINUED | OUTPATIENT
Start: 2023-02-08 | End: 2023-02-08

## 2023-02-08 RX ORDER — POLYETHYLENE GLYCOL 3350 17 G/17G
17 POWDER, FOR SOLUTION ORAL DAILY PRN
Status: DISCONTINUED | OUTPATIENT
Start: 2023-02-08 | End: 2023-02-10 | Stop reason: HOSPADM

## 2023-02-08 RX ORDER — AMLODIPINE BESYLATE 5 MG/1
10 TABLET ORAL
Status: DISCONTINUED | OUTPATIENT
Start: 2023-02-09 | End: 2023-02-10 | Stop reason: HOSPADM

## 2023-02-08 RX ORDER — CYCLOBENZAPRINE HCL 10 MG
10 TABLET ORAL 3 TIMES DAILY PRN
COMMUNITY

## 2023-02-08 RX ORDER — LEVOTHYROXINE SODIUM 0.03 MG/1
25 TABLET ORAL
COMMUNITY

## 2023-02-08 RX ORDER — NICOTINE POLACRILEX 4 MG
15 LOZENGE BUCCAL
Status: DISCONTINUED | OUTPATIENT
Start: 2023-02-08 | End: 2023-02-10 | Stop reason: HOSPADM

## 2023-02-08 RX ORDER — CARVEDILOL 6.25 MG/1
6.25 TABLET ORAL 2 TIMES DAILY WITH MEALS
Status: DISCONTINUED | OUTPATIENT
Start: 2023-02-08 | End: 2023-02-10 | Stop reason: HOSPADM

## 2023-02-08 RX ORDER — BISACODYL 5 MG/1
5 TABLET, DELAYED RELEASE ORAL DAILY PRN
Status: DISCONTINUED | OUTPATIENT
Start: 2023-02-08 | End: 2023-02-10 | Stop reason: HOSPADM

## 2023-02-08 RX ORDER — TRAMADOL HYDROCHLORIDE 50 MG/1
50 TABLET ORAL 2 TIMES DAILY PRN
COMMUNITY

## 2023-02-08 RX ORDER — DEXTROSE MONOHYDRATE 25 G/50ML
25 INJECTION, SOLUTION INTRAVENOUS
Status: DISCONTINUED | OUTPATIENT
Start: 2023-02-08 | End: 2023-02-10 | Stop reason: HOSPADM

## 2023-02-08 RX ORDER — DULAGLUTIDE 0.75 MG/.5ML
0.75 INJECTION, SOLUTION SUBCUTANEOUS
COMMUNITY

## 2023-02-08 RX ADMIN — SENNOSIDES AND DOCUSATE SODIUM 2 TABLET: 8.6; 5 TABLET ORAL at 09:14

## 2023-02-08 RX ADMIN — AMLODIPINE BESYLATE 5 MG: 5 TABLET ORAL at 09:19

## 2023-02-08 RX ADMIN — MAGNESIUM SULFATE HEPTAHYDRATE 2 G: 40 INJECTION, SOLUTION INTRAVENOUS at 00:25

## 2023-02-08 RX ADMIN — SODIUM CHLORIDE 40 ML: 9 INJECTION, SOLUTION INTRAVENOUS at 12:29

## 2023-02-08 RX ADMIN — ACETAMINOPHEN 650 MG: 325 TABLET ORAL at 01:28

## 2023-02-08 RX ADMIN — SODIUM CHLORIDE 5 MG/HR: 9 INJECTION, SOLUTION INTRAVENOUS at 09:36

## 2023-02-08 RX ADMIN — PANTOPRAZOLE SODIUM 40 MG: 40 TABLET, DELAYED RELEASE ORAL at 09:14

## 2023-02-08 RX ADMIN — SODIUM CHLORIDE 100 ML/HR: 9 INJECTION, SOLUTION INTRAVENOUS at 07:50

## 2023-02-08 RX ADMIN — SODIUM CHLORIDE 5 MG/HR: 9 INJECTION, SOLUTION INTRAVENOUS at 14:44

## 2023-02-08 RX ADMIN — AMLODIPINE BESYLATE 5 MG: 5 TABLET ORAL at 17:15

## 2023-02-08 RX ADMIN — CARVEDILOL 6.25 MG: 6.25 TABLET, FILM COATED ORAL at 17:15

## 2023-02-08 RX ADMIN — Medication 10 ML: at 09:15

## 2023-02-08 RX ADMIN — Medication 10 ML: at 00:25

## 2023-02-08 RX ADMIN — ACETAMINOPHEN 650 MG: 325 TABLET ORAL at 16:02

## 2023-02-08 NOTE — CONSULTS
[facility]   Neurosurgery Consult    Patient Name:Rebecca Mchugh  : 1950  MRN: [id number]  Primary Care Physician: Provider, No Known  Date of admission: 2023    Subjective   Subjective     Chief Complaint: Abnormal head CT    History of Present Illness   Rebecca Mchugh is a 72 y.o. female who was apparently walking her dog and tripped.  She hit fairly hard.  She did not lose consciousness.  She came to the emergency department to be evaluated.  She was having some nausea but no significant headache at the time.  A CT scan was performed which demonstrated a falcine subdural hematoma.  The only blood thinner she takes at home is a baby aspirin.  She feels like she is at her neurologic baseline with no new weakness numbness or memory issues.    Review of Systems   Neurological: Negative for speech difficulty, weakness, numbness and headaches.         Personal History     Past Medical History:   Diagnosis Date   • Acid reflux    • Ankle fracture    • Bladder disorder    • Breast cancer (HCC)     Right   • Cancer (HCC)    • Colitis    • Diabetes (HCC)    • Diabetes mellitus (HCC)    • GERD (gastroesophageal reflux disease)    • History of spinal fracture 2020   • Hyperlipemia    • Recurrent UTI (urinary tract infection)    • Renal calculus or stone    • Seasonal allergies    • Trigger thumb of right hand 2021   • Urinary incontinence in female        Past Surgical History:   Procedure Laterality Date   • ABDOMINAL HYSTERECTOMY     • BACK SURGERY      HERNIATED DISCS  X3 OR 4 TIMES    • BLADDER REPAIR     • BREAST BIOPSY     • BREAST LUMPECTOMY Right     breast mass   • CHOLECYSTECTOMY     • COLONOSCOPY     • CYSTOSCOPY      with dilation   • GALLBLADDER SURGERY     • INTERSTIM PLACEMENT  -?   • INTRAOCULAR LENS INSERTION     • TRIGGER FINGER RELEASE Right 2021    Procedure: RIGHT FINGER TRIGGER THUMB RELEASE;  Surgeon: Eyad Griffith MD;  Location: [facility] OR [facility];  Service:  Orthopedics;  Laterality: Right;   • VAGINAL MESH REVISION      vaginal approach per Dr. Liao       Family History: Her family history includes Breast cancer in her mother; Cancer in her brother and mother; Diabetes in her brother; Heart disease in her brother.     Social History: She  reports that she has quit smoking. Her smoking use included cigarettes. She smoked an average of 1 pack per day. She does not have any smokeless tobacco history on file. She reports that she does not drink alcohol and does not use drugs.    Home Medications:  HYDROcodone-acetaminophen, amitriptyline, atorvastatin, calcium carbonate, fesoterodine fumarate, levocetirizine, lidocaine, metFORMIN, multivitamin with minerals, omeprazole, and predniSONE    Allergies:  She is allergic to strawberry.    Objective    Objective     Vitals:    Temp:  [97.9 °F (36.6 °C)-98.5 °F (36.9 °C)] 98.3 °F (36.8 °C)  Heart Rate:  [73-94] 80  Resp:  [16-18] 16  BP: (104-183)/(47-98) 136/72  Flow (L/min):  [2] 2    Physical Exam  Constitutional:       Appearance: She is normal weight.   Pulmonary:      Effort: Pulmonary effort is normal.   Neurological:      Mental Status: She is alert and oriented to person, place, and time.      Cranial Nerves: Cranial nerve deficit:  No pronator drift.      Sensory: No sensory deficit.      Motor: No weakness.      Comments: Speech fluent   Psychiatric:         Mood and Affect: Mood normal.          Result Review    Result Review:  I have personally reviewed the results from the time of this admission to 2/8/2023 08:42 EST and agree with these findings:  []  Laboratory  []  Microbiology  [x]  Radiology  []  EKG/Telemetry   []  Cardiology/Vascular   []  Pathology  []  Old records  []  Other:  Most notable findings include: Head CT reviewed is a small fall seen subdural hematoma anteriorly approximately 6 mm in thickness.    Assessment & Plan   Assessment / Plan     Brief Patient Summary:  Rebecca LAIRD Jeison is a 72 y.o.  female with fall and acute subdural hematoma    Active Hospital Problems:  Active Hospital Problems    Diagnosis    • **Subdural hemorrhage (HCC)    • Subdural hematoma      Plan:   Repeat CT scan.  If this is stable then she could be discharged from the ICU.  Once she is up and ambulating and well medically she could theoretically be discharged to home.  She should have a repeat CT scan in about 2 weeks.  If this shows resolution of the blood then she could resume her baby aspirin.    DVT prophylaxis:  Mechanical DVT prophylaxis orders are present.

## 2023-02-08 NOTE — ED NOTES
"Patient states she \"Just lost my footing\" while walking dog. Denies any other reason for the fall.   "

## 2023-02-08 NOTE — PLAN OF CARE
Goal Outcome Evaluation:  Plan of Care Reviewed With: patient        Progress: no change  Outcome Evaluation: Patient not appropriate for skilled PT services at this time as she has not had a decline in functional mobility. Patient safe to continue transferring and ambulating until her discharge from the hospital. Patient safe to return home once medically able. D/C PT order with patient in agreement.

## 2023-02-08 NOTE — H&P
[facility]   HISTORY AND PHYSICAL    Patient Name: Rebecca Mchugh  : 1950  MRN: [id number]  Primary Care Physician: Provider, No Known  Date of admission: 2023    Subjective   Subjective     Chief Complaint: Fall    History of Present Illness  72-year-old female former smoker with history of breast cancer, DM, HLD presents following mechanical fall.  She was walking her dog when she tripped on the pavement, falling and hitting her back and head.  She denies dizziness, confusion, loss of consciousness.  She denies headache but does have neck pain.  She has no current dizziness, confusion, change in vision.  She denies any recent illness; denies fevers, chills, chest pain, shortness of breath, abdominal pain, pain with urination, change in bowel habits.    Note: The patient has a stimulator due to urinary retention.    In the ED:  VSS    Labs largely unremarkable:  -Corrected sodium 130  BUN/creatinine 12/1.19 (possibly up from baseline of 0.80)  Glucose 151    Magnesium 1.5    WBC 5.3    CXR:  1. Increased density in the right infrahilar/basilar region.  This is suspected to at least in part represent bronchovascular crowding although superimposed pneumonia or atelectasis is not excluded.    CT cervical spine:  -No acute process    CT head:  1. No fracture demonstrated  2. Mild small vessel ischemic changes in the white matter  3. Parafalcine acute subdural hemorrhage as detailed above    ED consulted Dr. Booker:  -Observation  -Cardene drip with goal SBP < 140  -Hold aspirin    Review of Systems   Constitutional: Denies fevers or chills  Cardiovascular: Negative for chest pain and palpitations.   Gastrointestinal: Positive for nausea.   Musculoskeletal: Positive for neck pain. Negative for arthralgias, back pain and joint swelling.   Skin: Negative for wound.   Neurological: Negative for syncope, weakness, numbness and headaches.   All other systems reviewed and are negative.       Personal  History     Past Medical History:   Diagnosis Date   • Acid reflux    • Ankle fracture 2020   • Bladder disorder    • Breast cancer (HCC)     Right   • Cancer (HCC)    • Colitis    • Diabetes (HCC)    • Diabetes mellitus (HCC)    • GERD (gastroesophageal reflux disease)    • History of spinal fracture 11/2020   • Hyperlipemia    • Recurrent UTI (urinary tract infection)    • Renal calculus or stone    • Seasonal allergies    • Trigger thumb of right hand 6/17/2021   • Urinary incontinence in female        Past Surgical History:   Procedure Laterality Date   • ABDOMINAL HYSTERECTOMY     • BACK SURGERY      HERNIATED DISCS  X3 OR 4 TIMES    • BLADDER REPAIR  2002   • BREAST BIOPSY     • BREAST LUMPECTOMY Right     breast mass   • CHOLECYSTECTOMY     • COLONOSCOPY     • CYSTOSCOPY      with dilation   • GALLBLADDER SURGERY     • INTERSTIM PLACEMENT  2018-19?   • INTRAOCULAR LENS INSERTION     • TRIGGER FINGER RELEASE Right 6/17/2021    Procedure: RIGHT FINGER TRIGGER THUMB RELEASE;  Surgeon: Eyad Griffith MD;  Location: [facility] OR [facility];  Service: Orthopedics;  Laterality: Right;   • VAGINAL MESH REVISION      vaginal approach per Dr. Liao       Family History: family history includes Breast cancer in her mother; Cancer in her brother and mother; Diabetes in her brother; Heart disease in her brother. Otherwise pertinent FHx was reviewed and not pertinent to current issue.    Social History:  reports that she has quit smoking. Her smoking use included cigarettes. She smoked an average of 1 pack per day. She does not have any smokeless tobacco history on file. She reports that she does not drink alcohol and does not use drugs.    Home Medications:  HYDROcodone-acetaminophen, amitriptyline, atorvastatin, calcium carbonate, fesoterodine fumarate, levocetirizine, lidocaine, metFORMIN, multivitamin with minerals, omeprazole, and predniSONE      Allergies:  Allergies   Allergen Reactions   • [place] Hives       Objective     Objective     Vitals:  Temp:  [98.5 °F (36.9 °C)] 98.5 °F (36.9 °C)  Heart Rate:  [88-92] 90  Resp:  [16-18] 16  BP: (166-183)/(80-98) 183/84    Physical Exam  Constitutional:       Appearance: Normal appearance. She is not ill-appearing or toxic-appearing.   HENT:      Head: Normocephalic.      Nose: Nose normal.      Mouth/Throat:      Mouth: Mucous membranes are moist.   Eyes:      Extraocular Movements: Extraocular movements intact.      Conjunctiva/sclera: Conjunctivae normal.      Pupils: Pupils are equal, round, and reactive to light.   Cardiovascular:      Rate and Rhythm: Normal rate and regular rhythm.   Pulmonary:      Effort: Pulmonary effort is normal.      Breath sounds: Normal breath sounds.   Musculoskeletal:         General: Tenderness (cervical spine) present. Normal range of motion.      Cervical back: Normal range of motion.   Skin:     General: Skin is warm and dry.      Findings: No bruising.   Neurological:      General: No focal deficit present.      Mental Status: She is alert.      Cranial Nerves: No cranial nerve deficit.      Sensory: No sensory deficit.      Motor: No weakness.      Coordination: Coordination normal.   Result Review    Result Review:  I have personally reviewed the results from the time of this admission to 2/7/2023 21:55 EST and agree with these findings:  [x]  Laboratory list / accordion  []  Microbiology  [x]  Radiology  [x]  EKG/Telemetry   []  Cardiology/Vascular   []  Pathology  []  Old records  []  Other:  Most notable findings include: Subdural bleed; Equivocal CXR        Assessment & Plan   Assessment / Plan     Brief Patient Summary:  72-year-old female former smoker with history of breast cancer, DM, HLD presents following mechanical fall with head trauma.  Found to have acute subdural hemorrhage.    Active Hospital Problems:  Active Hospital Problems    Diagnosis    • **Subdural hemorrhage (HCC)        Plan:   Subdural hemorrhage  -Fall with head  trauma  -Dr. Booker consulted   -Observation    -Hold aspirin   -Control BP     Equivocal findings on CXR  -Patient denies absolutely any respiratory issues    Chronic issues:  [] Reinstate home meds when appropriate   -Note: Holding aspirin for now      DVT prophylaxis:  SCDs    CODE STATUS: DNR/DNI    Admission Status:  I believe this patient meets OBS status.    Herber Meyer MD,

## 2023-02-08 NOTE — SIGNIFICANT NOTE
02/08/23 1045   Coping/Psychosocial   Observed Emotional State calm;cooperative   Verbalized Emotional State hopefulness   Trust Relationship/Rapport empathic listening provided   Involvement in Care interacting with patient   Additional Documentation Spiritual Care (Group)   Spiritual Care   Use of Spiritual Resources non-Rastafarian use of spiritual care   Spiritual Care Source  initiative   Spiritual Care Follow-Up follow-up, none required as presently assessed   Response to Spiritual Care engaged in conversation;receptive of support;thanks expressed   Spiritual Care Interventions supportive conversation provided   Spiritual Care Visit Type initial   Receptivity to Spiritual Care visit welcomed

## 2023-02-08 NOTE — THERAPY EVALUATION
Acute Care - Speech Language Pathology   Swallow Initial Evaluation  [place]     Patient Name: Rebecca Mchugh  : 1950  MRN: [id number]  Today's Date: 2023               Admit Date: 2023    Visit Dx:     ICD-10-CM ICD-9-CM   1. Subdural hemorrhage (HCC)  I62.00 432.1     Patient Active Problem List   Diagnosis   • Trigger thumb of right hand   • Aftercare following right 1st TFR   • Osteoporosis   • Subdural hemorrhage (HCC)   • Subdural hematoma     Past Medical History:   Diagnosis Date   • Acid reflux    • Ankle fracture    • Bladder disorder    • Breast cancer (HCC)     Right   • Cancer (HCC)    • Colitis    • Diabetes (HCC)    • Diabetes mellitus (HCC)    • GERD (gastroesophageal reflux disease)    • History of spinal fracture 2020   • Hyperlipemia    • Recurrent UTI (urinary tract infection)    • Renal calculus or stone    • Seasonal allergies    • Trigger thumb of right hand 2021   • Urinary incontinence in female      Past Surgical History:   Procedure Laterality Date   • ABDOMINAL HYSTERECTOMY     • BACK SURGERY      HERNIATED DISCS  X3 OR 4 TIMES    • BLADDER REPAIR     • BREAST BIOPSY     • BREAST LUMPECTOMY Right     breast mass   • CHOLECYSTECTOMY     • COLONOSCOPY     • CYSTOSCOPY      with dilation   • GALLBLADDER SURGERY     • INTERSTIM PLACEMENT  -?   • INTRAOCULAR LENS INSERTION     • TRIGGER FINGER RELEASE Right 2021    Procedure: RIGHT FINGER TRIGGER THUMB RELEASE;  Surgeon: Eyad Griffith MD;  Location: [facility] OR [place];  Service: Orthopedics;  Laterality: Right;   • VAGINAL MESH REVISION      vaginal approach per Dr. Liao       SLP Recommendation and Plan  SLP Swallowing Diagnosis: swallow WFL/no suspected pharyngeal impairment (23)  SLP Diet Recommendation: regular textures, thin liquids (23)  Recommended Precautions and Strategies: upright posture during/after eating (23)  SLP Rec. for Method of Medication  Administration: meds whole, as tolerated (02/08/23 0830)     Monitor for Signs of Aspiration: notify SLP if any concerns (02/08/23 0830)     Swallow Criteria for Skilled Therapeutic Interventions Met: no problems identified which require skilled intervention (02/08/23 0830)  Anticipated Discharge Disposition (SLP): home (02/08/23 1315)     Therapy Frequency (Swallow): evaluation only (02/08/23 0830)                                                  SWALLOW EVALUATION (last 72 hours)     SLP Adult Swallow Evaluation     Row Name 02/08/23 0830                   Rehab Evaluation    Document Type evaluation  -SN        Subjective Information no complaints  -SN        Patient Observations alert;cooperative  -SN        Patient Effort adequate  -SN        Symptoms Noted During/After Treatment none  -SN           General Information    Current Method of Nutrition regular textures;thin liquids  -SN        Prior Level of Function-Swallowing no diet consistency restrictions  -SN        Plans/Goals Discussed with patient  -SN        Barriers to Rehab none identified  -SN        Patient's Goals for Discharge return home  -SN           Oral Motor Structure and Function    Dentition Assessment natural, present and adequate  -SN        Secretion Management WNL/WFL  -SN        Mucosal Quality dry  -SN        Gag Response WFL  -SN        Volitional Swallow WFL  -SN        Volitional Cough WFL;non-productive  -SN           Oral Musculature and Cranial Nerve Assessment    Oral Motor General Assessment WFL  -SN           General Eating/Swallowing Observations    Respiratory Support Currently in Use room air  -SN        Eating/Swallowing Skills self-fed  -SN        Positioning During Eating upright 90 degree  -SN        Utensils Used spoon;cup;straw  -SN        Consistencies Trialed regular textures;thin liquids;pureed  -SN           Clinical Swallow Eval    Oral Prep Phase WFL  -SN        Oral Transit WFL  -SN        Oral Residue WFL   -SN        Pharyngeal Phase no overt signs/symptoms of pharyngeal impairment  -SN        Esophageal Phase unremarkable  -SN        Clinical Swallow Evaluation Summary Patient is a 72-year-old female referred for speech pathology dysphagia evaluation secondary to subdural hemorrhage.  Patient reports a fall after tripping over her dog at home.  Oral motor examination did not reveal any deficits.  Oral motor strength and range of motion within functional limits.  Patient not complaining of any decreased sensation.  Consistencies presented at bedside with patient exhibiting no overt signs or symptoms of aspiration.  Sign aspiration cannot be ruled out at bedside.  -SN           SLP Evaluation Clinical Impression    SLP Swallowing Diagnosis swallow WFL/no suspected pharyngeal impairment  -SN        Swallow Criteria for Skilled Therapeutic Interventions Met no problems identified which require skilled intervention  -SN           Recommendations    Therapy Frequency (Swallow) evaluation only  -SN        SLP Diet Recommendation regular textures;thin liquids  -SN        Recommended Precautions and Strategies upright posture during/after eating  -SN        SLP Rec. for Method of Medication Administration meds whole;as tolerated  -SN        Monitor for Signs of Aspiration notify SLP if any concerns  -SN              User Key  (r) = Recorded By, (t) = Taken By, (c) = Cosigned By    Initials Name Effective Dates    Annalisa Giles MS-CCC/SLP, [facility] 03/31/21 -             Patient demonstrates level 7 of 7 on functional communication measures for swallowing, indicating a 0% limitation in function.    EDUCATION  The patient has been educated in the following areas:   Dysphagia (Swallowing Impairment).              Time Calculation:    Time Calculation- SLP     Row Name 02/08/23 5745             Time Calculation- SLP    SLP Start Time 0730  -SN      SLP Stop Time 0830  -SN      SLP Time Calculation (min) 60 min  -SN      SLP  Received On 02/08/23  -SN         Untimed Charges    40243-AP Eval Oral Pharyng Swallow Minutes 60  -SN         Total Minutes    Untimed Charges Total Minutes 60  -SN       Total Minutes 60  -SN            User Key  (r) = Recorded By, (t) = Taken By, (c) = Cosigned By    Initials Name Provider Type    SN Annalisa Dickinson MS-CCC/SLP, [facility] Speech and Language Pathologist                Therapy Charges for Today     Code Description Service Date Service Provider Modifiers Qty    34036232721  ST EVAL ORAL PHARYNG SWALLOW 4 2/8/2023 Annalisa Dickinson MS-CCC/SLPEDITH GN 1               MARK Mcdonald/SLP, CNT  2/8/2023

## 2023-02-08 NOTE — ED NOTES
Patient brought via [facility] for a fall.  Patient was walking her dog and fell.  Patient did hit her head, no LOC per EMS.  Patient complaining of neck pain. Patient denies vomiting but does state she is nauseated. Denies changes in vision.

## 2023-02-08 NOTE — THERAPY EVALUATION
Acute Care - Physical Therapy Initial Evaluation   [place]     Patient Name: Rebecca Mchugh  : 1950  MRN: [id number]  Today's Date: 2023      Visit Dx:     ICD-10-CM ICD-9-CM   1. Subdural hemorrhage (HCC)  I62.00 432.1   2. Difficulty walking  R26.2 719.7   3. Decreased activities of daily living (ADL)  Z78.9 V49.89     Patient Active Problem List   Diagnosis   • Trigger thumb of right hand   • Aftercare following right 1st TFR   • Osteoporosis   • Subdural hemorrhage (HCC)   • Subdural hematoma     Past Medical History:   Diagnosis Date   • Acid reflux    • Ankle fracture    • Bladder disorder    • Breast cancer (HCC)     Right   • Cancer (HCC)    • Colitis    • Diabetes (HCC)    • Diabetes mellitus (HCC)    • GERD (gastroesophageal reflux disease)    • History of spinal fracture 2020   • Hyperlipemia    • Recurrent UTI (urinary tract infection)    • Renal calculus or stone    • Seasonal allergies    • Trigger thumb of right hand 2021   • Urinary incontinence in female      Past Surgical History:   Procedure Laterality Date   • ABDOMINAL HYSTERECTOMY     • BACK SURGERY      HERNIATED DISCS  X3 OR 4 TIMES    • BLADDER REPAIR     • BREAST BIOPSY     • BREAST LUMPECTOMY Right     breast mass   • CHOLECYSTECTOMY     • COLONOSCOPY     • CYSTOSCOPY      with dilation   • GALLBLADDER SURGERY     • INTERSTIM PLACEMENT  ?   • INTRAOCULAR LENS INSERTION     • TRIGGER FINGER RELEASE Right 2021    Procedure: RIGHT FINGER TRIGGER THUMB RELEASE;  Surgeon: Eyad Griffith MD;  Location: [facility] OR [facility] [place];  Service: Orthopedics;  Laterality: Right;   • VAGINAL MESH REVISION      vaginal approach per Dr. Liao     PT Assessment (last 12 hours)     PT Evaluation and Treatment     Row Name 23 1534 23 1529       Physical Therapy Time and Intention    Document Type evaluation  -AV --  -AV    Mode of Treatment individual therapy;physical therapy  -AV --  -AV    Row Name 23 1534  02/08/23 1529       General Information    Patient Profile Reviewed yes  -AV --  -AV    Patient Observations alert;cooperative;agree to therapy  -AV --  -AV    Prior Level of Function independent:;all household mobility;gait;transfer;ADL's  Ambulated without an assistive device. No home O2.  -AV --  Ambulated without an assistive device. No home O2.   -AV    Equipment Currently Used at Home none  -AV --  -AV    Existing Precautions/Restrictions no known precautions/restrictions  -AV --  -AV    Row Name 02/08/23 1534 02/08/23 1529       Living Environment    Current Living Arrangements home  -AV --  -AV    Home Accessibility stairs to enter home  -AV --  -AV    People in Home alone  Family/neighbors can assist if needed.  -AV --  -AV    Row Name 02/08/23 1534 02/08/23 1529       Home Main Entrance    Number of Stairs, Main Entrance none  -AV --  -AV    Row Name 02/08/23 1534          Cognition    Orientation Status (Cognition) oriented x 4  -AV     Row Name 02/08/23 1534 02/08/23 1529       Range of Motion (ROM)    Range of Motion bilateral lower extremities;ROM is WFL  -AV --  -AV    Row Name 02/08/23 1534 02/08/23 1529       Strength (Manual Muscle Testing)    Strength (Manual Muscle Testing) bilateral lower extremities;strength is WFL  -AV bilateral lower extremities;strength is WFL  -AV    Row Name 02/08/23 1534          Bed Mobility    Bed Mobility bed mobility (all) activities  -AV     All Activities, [place] (Bed Mobility) independent  -AV     Row Name 02/08/23 1534          Transfers    Transfers sit-stand transfer;stand-sit transfer  -AV     Row Name 02/08/23 1534          Sit-Stand Transfer    Sit-Stand [place] (Transfers) independent  -AV     Assistive Device (Sit-Stand Transfers) --  No AD  -AV     Row Name 02/08/23 1534          Stand-Sit Transfer    Stand-Sit [place] (Transfers) independent  -AV     Assistive Device (Stand-Sit Transfers) --  No AD  -AV     Row Name 02/08/23 1534           Gait/Stairs (Locomotion)    Gait/Stairs Locomotion gait/ambulation independence;gait/ambulation assistive device;distance ambulated  -AV     [place] Level (Gait) independent  -AV     Assistive Device (Gait) --  No AD  -AV     Distance in Feet (Gait) 60  Ambulation in the hallway deferred per nurse due to patient's blood pressure needing to be monitored  -AV     Pattern (Gait) step-through  -AV     Row Name 02/08/23 1534          Balance    Balance Assessment standing dynamic balance  -AV     Dynamic Standing Balance independent  -AV     Position/Device Used, Standing Balance unsupported  -AV     Row Name 02/08/23 1533          Plan of Care Review    Plan of Care Reviewed With patient  -AV     Progress no change  -AV     Outcome Evaluation Patient not appropriate for skilled PT services at this time as she has not had a decline in functional mobility. Patient safe to continue transferring and ambulating until her discharge from the hospital. Patient safe to return home once medically able. D/C PT order with patient in agreement.  -AV     Row Name 02/08/23 1538          Positioning and Restraints    Pre-Treatment Position in bed  -AV     Post Treatment Position bsc  -AV     On BS commode call light within reach;encouraged to call for assist  -AV     Row Name 02/08/23 9769          Therapy Assessment/Plan (PT)    Criteria for Skilled Interventions Met (PT) no problems identified which require skilled intervention  -AV     Therapy Frequency (PT) evaluation only  -AV     Row Name 02/08/23 1538          PT Evaluation Complexity    History, PT Evaluation Complexity no personal factors and/or comorbidities  -AV     Examination of Body Systems (PT Eval Complexity) total of 4 or more elements  -AV     Clinical Presentation (PT Evaluation Complexity) stable  -AV     Clinical Decision Making (PT Evaluation Complexity) low complexity  -AV     Overall Complexity (PT Evaluation Complexity) low complexity  -AV     Row Name  02/08/23 1534          Therapy Plan Review/Discharge Plan (PT)    Therapy Plan Review (PT) evaluation/treatment results reviewed;patient  -AV           User Key  (r) = Recorded By, (t) = Taken By, (c) = Cosigned By    Initials Name Provider Type    AV Mannie Marte, PT Physical Therapist                Physical Therapy Education     Title: PT OT SLP Therapies (In Progress)     Topic: Physical Therapy (In Progress)     Point: Mobility training (Done)     Learning Progress Summary           Patient Acceptance, E,TB, VU by AV at 2/8/2023 1541                   Point: Home exercise program (Not Started)     Learner Progress:  Not documented in this visit.          Point: Body mechanics (Done)     Learning Progress Summary           Patient Acceptance, E,TB, VU by AV at 2/8/2023 1541                   Point: Precautions (Done)     Learning Progress Summary           Patient Acceptance, E,TB, VU by AV at 2/8/2023 1541                               User Key     Initials Effective Dates Name Provider Type Discipline    AV 06/11/21 -  Mannie Marte, PT Physical Therapist PT              PT Recommendation and Plan  Anticipated Discharge Disposition (PT): home  Therapy Frequency (PT): evaluation only  Plan of Care Reviewed With: patient  Progress: no change  Outcome Evaluation: Patient not appropriate for skilled PT services at this time as she has not had a decline in functional mobility. Patient safe to continue transferring and ambulating until her discharge from the hospital. Patient safe to return home once medically able. D/C PT order with patient in agreement.   Outcome Measures     Row Name 02/08/23 1540             How much help from another person do you currently need...    Turning from your back to your side while in flat bed without using bedrails? 4  -AV      Moving from lying on back to sitting on the side of a flat bed without bedrails? 4  -AV      Moving to and from a bed to a chair (including a  wheelchair)? 4  -AV      Standing up from a chair using your arms (e.g., wheelchair, bedside chair)? 4  -AV      Climbing 3-5 steps with a railing? 4  -AV      To walk in hospital room? 4  -AV      AM-PAC 6 Clicks Score (PT) 24  -AV         Functional Assessment    Outcome Measure Options AM-PAC 6 Clicks Basic Mobility (PT)  -AV            User Key  (r) = Recorded By, (t) = Taken By, (c) = Cosigned By    Initials Name Provider Type    Mannie Gonzalez, PT Physical Therapist                 Time Calculation:    PT Charges     Row Name 02/08/23 1540             Time Calculation    PT Received On 02/08/23  -AV         Untimed Charges    PT Eval/Re-eval Minutes 32  -AV         Total Minutes    Untimed Charges Total Minutes 32  -AV       Total Minutes 32  -AV            User Key  (r) = Recorded By, (t) = Taken By, (c) = Cosigned By    Initials Name Provider Type    Mannie Gonzalez, PT Physical Therapist              Therapy Charges for Today     Code Description Service Date Service Provider Modifiers Qty    10137059033 HC PT EVAL LOW COMPLEXITY 3 2/8/2023 Mannie Marte, PT GP 1          PT G-Codes  Outcome Measure Options: AM-PAC 6 Clicks Basic Mobility (PT)  AM-PAC 6 Clicks Score (PT): 24  AM-PAC 6 Clicks Score (OT): 24    Mannie Marte PT  2/8/2023

## 2023-02-08 NOTE — ED PROVIDER NOTES
Time: 9:04 PM EST  Date of encounter:  2/7/2023  Independent Historian/Clinical History and Information was obtained by:   Patient  Chief Complaint: fall, head injury    History is limited by: N/A    History of Present Illness:  Patient is a 72 y.o. year old female who presents to the emergency department for evaluation of mechanical fall, head injury.  The patient was walking her dog and it started to get dark so tripped on uneven pavement.  Fell onto her back and hit her head.  Denies LOC, headache.  Has mild nausea.  Complaining of neck pain.  Denies any extremity pain, back pain.  She takes 81 mg ASA daily but no other anticoagulation.  Denies vision changes, numbness, weakness. Denies any prodromal symptoms including chest pain, palpitations, SOA, lightheadedness, dizziness. No recent URI symptoms.     HPI    Patient Care Team  Primary Care Provider: Provider, No Known    Past Medical History:     Allergies   Allergen Reactions   • [place] Hives     Past Medical History:   Diagnosis Date   • Acid reflux    • Ankle fracture 2020   • Bladder disorder    • Breast cancer (HCC)     Right   • Cancer (HCC)    • Colitis    • Diabetes (HCC)    • Diabetes mellitus (HCC)    • GERD (gastroesophageal reflux disease)    • History of spinal fracture 11/2020   • Hyperlipemia    • Recurrent UTI (urinary tract infection)    • Renal calculus or stone    • Seasonal allergies    • Trigger thumb of right hand 6/17/2021   • Urinary incontinence in female      Past Surgical History:   Procedure Laterality Date   • ABDOMINAL HYSTERECTOMY     • BACK SURGERY      HERNIATED DISCS  X3 OR 4 TIMES    • BLADDER REPAIR  2002   • BREAST BIOPSY     • BREAST LUMPECTOMY Right     breast mass   • CHOLECYSTECTOMY     • COLONOSCOPY     • CYSTOSCOPY      with dilation   • GALLBLADDER SURGERY     • INTERSTIM PLACEMENT  2018-19?   • INTRAOCULAR LENS INSERTION     • TRIGGER FINGER RELEASE Right 6/17/2021    Procedure: RIGHT FINGER TRIGGER THUMB  RELEASE;  Surgeon: Eyad Griffith MD;  [place] [facility] [place] [facility] [place]  Service: Orthopedics;  Laterality: Right;   • VAGINAL MESH REVISION      vaginal approach per Dr. Liao     Family History   Problem Relation Age of Onset   • Cancer Mother         unspecified   • Breast cancer Mother         30s   • Heart disease Brother    • Cancer Brother         unspecified   • Diabetes Brother         unspecified type   • Malig Hyperthermia Neg Hx        Home Medications:  Prior to Admission medications    Medication Sig Start Date End Date Taking? Authorizing Provider   amitriptyline (ELAVIL) 25 MG tablet Take 25 mg by mouth Every Night.    Ryne Reilly MD   atorvastatin (LIPITOR) 40 MG tablet Take 40 mg by mouth Daily.    Ryne Reilly MD   calcium carbonate (OS-CHAPARRO) 600 MG tablet Take 500 mg by mouth 2 (Two) Times a Day With Meals.    Ryne Reilly MD   fesoterodine fumarate (TOVIAZ ER) 8 MG tablet sustained-release 24 hour tablet Take  by mouth Daily.    Ryne Reilly MD   HYDROcodone-acetaminophen (NORCO) 5-325 MG per tablet Take 1 tablet by mouth 4 (Four) Times a Day As Needed for Moderate Pain . 5/12/22   Ren Mckay MD   levocetirizine (XYZAL) 5 MG tablet Take 5 mg by mouth Every Evening.    ProviderRyne MD   lidocaine (Lidoderm) 5 % Place 1 patch on the skin as directed by provider Daily. Remove & Discard patch within 12 hours or as directed by MD 5/12/22   Golden Clark APRN   metFORMIN (GLUCOPHAGE) 500 MG tablet Take 500 mg by mouth 2 (Two) Times a Day With Meals.    Ryne Relily MD   multivitamin with minerals tablet tablet Take 1 tablet by mouth Daily.    Ryne Reilly MD   omeprazole (priLOSEC) 40 MG capsule Take 40 mg by mouth Daily.    Ryne Reilly MD   predniSONE (DELTASONE) 20 MG tablet Take 2 tablets by mouth Daily. 5/12/22   Golden Clark APRN        Social History:   Social History     Tobacco Use   • Smoking status: Former      "Packs/day: 1.00     Types: Cigarettes   • Tobacco comments:     quit smoking at age 48   Vaping Use   • Vaping Use: Never used   Substance Use Topics   • Alcohol use: Never   • Drug use: Never         Review of Systems:  Review of Systems   Cardiovascular: Negative for chest pain and palpitations.   Gastrointestinal: Positive for nausea.   Musculoskeletal: Positive for neck pain. Negative for arthralgias, back pain and joint swelling.   Skin: Negative for wound.   Neurological: Negative for syncope, weakness, numbness and headaches.   All other systems reviewed and are negative.       Physical Exam:  /55   Pulse 86   Temp 97.9 °F (36.6 °C) (Oral)   Resp 16   Ht 162.6 cm (64\")   Wt 64.8 kg (142 lb 13.7 oz)   SpO2 91%   BMI 24.52 kg/m²     Physical Exam  Vitals and nursing note reviewed.   Constitutional:       Appearance: Normal appearance. She is not ill-appearing or toxic-appearing.   HENT:      Head: Normocephalic.      Nose: Nose normal.      Mouth/Throat:      Mouth: Mucous membranes are moist.   Eyes:      Extraocular Movements: Extraocular movements intact.      Conjunctiva/sclera: Conjunctivae normal.      Pupils: Pupils are equal, round, and reactive to light.   Cardiovascular:      Rate and Rhythm: Normal rate and regular rhythm.   Pulmonary:      Effort: Pulmonary effort is normal.      Breath sounds: Normal breath sounds.   Musculoskeletal:         General: Tenderness (cervical spine) present. Normal range of motion.      Cervical back: Normal range of motion.   Skin:     General: Skin is warm and dry.      Capillary Refill: Capillary refill takes less than 2 seconds.      Findings: No bruising.   Neurological:      General: No focal deficit present.      Mental Status: She is alert.      Cranial Nerves: No cranial nerve deficit.      Sensory: No sensory deficit.      Motor: No weakness.      Coordination: Coordination normal.                  Procedures:  Procedures      Medical Decision " Making:      Comorbidities that affect care:    Cancer, Diabetes    External Notes reviewed:    Previous ED Note      The following orders were placed and all results were independently analyzed by me:  Orders Placed This Encounter   Procedures   • XR Chest 1 View   • CT Head Without Contrast   • CT Cervical Spine Without Contrast   • [place] Draw   • Comprehensive Metabolic Panel   • Troponin   • Magnesium   • CBC Auto Differential   • High Sensitivity Troponin T 2Hr   • Hemoglobin A1c   • Lipid Panel   • CBC (No Diff)   • Basic Metabolic Panel   • Magnesium   • Diet: Diabetic Diets; Consistent Carbohydrate; Texture: Regular Texture (IDDSI 7); Fluid Consistency: Thin (IDDSI 0)   • Undress & Gown   • Vital Signs   • Orthostatic Blood Pressure   • Vital Signs   • Pulse Oximetry, Continuous   • Cardiac Monitoring   • Notify Provider   • Head of Bed   • Turn Patient   • Nursing Dysphagia Screening (Complete Prior to Giving Anything By Mouth)   • RN to Place Order SLP Consult - Eval & Treat Choosing Reason of RN Dysphagia Screen Failed   • Nurse to Call MD or Nutrition Services for Diet if Patient Passes Dysphagia Screen   • Intake and Output   • Neuro Checks   • NIHSS Assessment   • Order CT Head Without Contrast for Neurological Decline   • Provide Stroke Education Material   • Saline Lock & Maintain IV Access   • Place Sequential Compression Device   • Maintain Sequential Compression Device   • Commode At Bedside   • Activity As Tolerated   • Code Status and Medical Interventions:   • IP Consult to Neurosurgery   • Inpatient Hospitalist Consult   • Inpatient Case Management  Consult   • Inpatient Diabetes Educator Consult   • Inpatient Neurosurgery Consult   • OT Consult: Eval & Treat   • PT Consult: Eval & Treat As Tolerated   • Oxygen Therapy- Nasal Cannula; 2 LPM; Titrate for SPO2: 92%, Greater Than or Equal To   • POC Glucose Once   • POC Glucose Once   • POC Glucose Q6H   • ECG 12 Lead ED Triage  Standing Order; Weak / Dizzy / AMS   • Insert Peripheral IV   • Insert Peripheral IV   • Initiate Observation Status   • Fall Precautions   • CBC & Differential   • Green Top (Gel)   • Lavender Top   • Gold Top - SST   • Light Blue Top       Medications Given in the Emergency Department:  Medications   sodium chloride 0.9 % flush 10 mL (has no administration in time range)   niCARdipine (CARDENE) 25 mg in 250 mL NS infusion (0 mg/hr Intravenous Stopped 2/8/23 0214)   sodium chloride 0.9 % flush 10 mL (has no administration in time range)   sodium chloride 0.9 % flush 10 mL (10 mL Intravenous Given 2/8/23 0025)   sodium chloride 0.9 % infusion 40 mL (has no administration in time range)   sodium chloride 0.9 % infusion (100 mL/hr Intravenous Currently Infusing 2/8/23 0122)   acetaminophen (TYLENOL) tablet 650 mg (650 mg Oral Given 2/8/23 0128)   magnesium sulfate 2g/50 mL (PREMIX) infusion (2 g Intravenous New Bag 2/8/23 0025)        ED Course:    ED Course as of 02/08/23 0218 Tue Feb 07, 2023   2100 CT Head Without Contrast [KM]      ED Course User Index  [KM] Mary Ferrari, CHALO       Labs:    Lab Results (last 24 hours)     Procedure Component Value Units Date/Time    POC Glucose Once [[id number]]  (Abnormal) Collected: 02/07/23 1949    Specimen: Blood Updated: 02/07/23 1950     Glucose 153 mg/dL      Comment: Serial Number: [id number]:  [id number]       CBC & Differential [[id number]]  (Abnormal) Collected: 02/07/23 2001    Specimen: Blood Updated: 02/07/23 2013    Narrative:      The following orders were created for panel order CBC & Differential.  Procedure                               Abnormality         Status                     ---------                               -----------         ------                     CBC Auto Differential[[id number]]        Abnormal            Final result                 Please view results for these tests on the individual orders.    Comprehensive Metabolic Panel  [[id number]]  (Abnormal) Collected: 02/07/23 2001    Specimen: Blood Updated: 02/07/23 2039     Glucose 151 mg/dL      BUN 12 mg/dL      Creatinine 1.19 mg/dL      Sodium 129 mmol/L      Potassium 4.0 mmol/L      Comment: Slight hemolysis detected by analyzer. Results may be affected.        Chloride 93 mmol/L      CO2 24.5 mmol/L      Calcium 9.1 mg/dL      Total Protein 7.3 g/dL      Albumin 4.3 g/dL      ALT (SGPT) 30 U/L      AST (SGOT) 27 U/L      Comment: Slight hemolysis detected by analyzer. Results may be affected.        Alkaline Phosphatase 126 U/L      Total Bilirubin 0.4 mg/dL      Globulin 3.0 gm/dL      A/G Ratio 1.4 g/dL      BUN/Creatinine Ratio 10.1     Anion Gap 11.5 mmol/L      eGFR 48.1 mL/min/1.73     Narrative:      GFR Normal >60  Chronic Kidney Disease <60  Kidney Failure <15    The GFR formula is only valid for adults with stable renal function between ages 18 and 70.    Troponin [[id number]]  (Normal) Collected: 02/07/23 2001    Specimen: Blood Updated: 02/07/23 2036     HS Troponin T 8 ng/L     Narrative:      High Sensitive Troponin T Reference Range:  <10.0 ng/L- Negative Female for AMI  <15.0 ng/L- Negative Male for AMI  >=10 - Abnormal Female indicating possible myocardial injury.  >=15 - Abnormal Male indicating possible myocardial injury.   Clinicians would have to utilize clinical acumen, EKG, Troponin, and serial changes to determine if it is an Acute Myocardial Infarction or myocardial injury due to an underlying chronic condition.         Magnesium [[id number]]  (Abnormal) Collected: 02/07/23 2001    Specimen: Blood Updated: 02/07/23 2036     Magnesium 1.5 mg/dL     CBC Auto Differential [[id number]]  (Abnormal) Collected: 02/07/23 2001    Specimen: Blood Updated: 02/07/23 2013     WBC 5.30 10*3/mm3      RBC 3.87 10*6/mm3      Hemoglobin 11.1 g/dL      Hematocrit 33.4 %      MCV 86.3 fL      MCH 28.7 pg      MCHC 33.2 g/dL      RDW 14.2 %      RDW-SD 44.8 fl      MPV 9.1 fL       Platelets 264 10*3/mm3      Neutrophil % 69.3 %      Lymphocyte % 17.0 %      Monocyte % 9.1 %      Eosinophil % 3.4 %      Basophil % 0.8 %      Immature Grans % 0.4 %      Neutrophils, Absolute 3.68 10*3/mm3      Lymphocytes, Absolute 0.90 10*3/mm3      Monocytes, Absolute 0.48 10*3/mm3      Eosinophils, Absolute 0.18 10*3/mm3      Basophils, Absolute 0.04 10*3/mm3      Immature Grans, Absolute 0.02 10*3/mm3      nRBC 0.0 /100 WBC     High Sensitivity Troponin T 2Hr [[id number]]  (Abnormal) Collected: 02/07/23 2136    Specimen: Blood Updated: 02/07/23 2239     HS Troponin T 10 ng/L      Troponin T Delta 2 ng/L     Narrative:      High Sensitive Troponin T Reference Range:  <10.0 ng/L- Negative Female for AMI  <15.0 ng/L- Negative Male for AMI  >=10 - Abnormal Female indicating possible myocardial injury.  >=15 - Abnormal Male indicating possible myocardial injury.   Clinicians would have to utilize clinical acumen, EKG, Troponin, and serial changes to determine if it is an Acute Myocardial Infarction or myocardial injury due to an underlying chronic condition.                Imaging:    CT Head Without Contrast    Result Date: 2/7/2023  PROCEDURE: CT HEAD WO CONTRAST  COMPARISON:  None INDICATIONS: headache  PROTOCOL:   Standard imaging protocol performed    RADIATION:   DLP: 1080mGy*cm   MA and/or KV was adjusted to minimize radiation dose.     TECHNIQUE: After obtaining the patient's consent, CT images were obtained without non-ionic intravenous contrast material.  FINDINGS:  There is acute subdural hemorrhage along the anterior falx measuring up to 3.6 cm AP and 0.6 cm in thickness.  No other intracranial hemorrhage is identified.  The ventricles are normal in size and configuration.  Mild low density in the white matter is consistent with small vessel ischemic disease.  No fracture is identified.        1. No fracture demonstrated 2. Mild small vessel ischemic changes in the white matter 3. Parafalcine acute  subdural hemorrhage as detailed above    I called a report to Dr. Santiago.     Clayton Lerma M.D.       Electronically Signed and Approved By: Clayton Lerma M.D. on 2/07/2023 at 20:51             CT Cervical Spine Without Contrast    Result Date: 2/7/2023  PROCEDURE: CT CERVICAL SPINE WO CONTRAST  COMPARISON: None  INDICATIONS: fall  PROTOCOL:   Standard imaging protocol performed    RADIATION:   DLP: 323.7mGy*cm   MA and/or KV was adjusted to minimize radiation dose.     TECHNIQUE: After obtaining the patient's consent, multi-planar CT images were created without contrast material.   FINDINGS:  There is been a previous right C4 laminectomy.  No acute fracture or malalignment is identified.  There is moderate degenerative disc disease in the mid and lower cervical spine.  Ground-glass opacities in the upper lung fields may be secondary to chronic interstitial lung disease.  Correlate clinically.        1. No acute cervical spine fracture or malalignment     Clayton Lerma M.D.       Electronically Signed and Approved By: Clayton Lerma M.D. on 2/07/2023 at 20:54             XR Chest 1 View    Result Date: 2/7/2023  PROCEDURE: XR CHEST 1 VW  COMPARISON: [facility], , CHEST AP/PA 1 VIEW, 9/26/2019, 23:21.  INDICATIONS: Weak/Dizzy/AMS  FINDINGS:  Prominent interstitial markings are noted, likely related to chronic interstitial lung disease.  There is increased density projecting over the right lung base/infrahilar region.  No effusion is seen.  The cardiac and mediastinal silhouettes appear normal.  Multiple surgical clips project over the right axilla.        1. Increased density in the right infrahilar/basilar region.  This is suspected to at least in part represent bronchovascular crowding although superimposed pneumonia or atelectasis is not excluded.       Clayton Lerma M.D.       Electronically Signed and Approved By: Clayton Lerma M.D. on 2/07/2023 at 20:45                 Differential Diagnosis and  Discussion:    Headache: Differential diagnosis includes but is not limited to migraine, cluster headache, hypertension, tumor, subarachnoid bleeding, pseudotumor cerebri, temporal arteritis, infections, tension headache, and TMJ syndrome.  Neck Pain: The patient presents with neck pain. My differential diagnosis includes but is not limited to acute spinal epidural abscess, acute spinal epidural bleed, meningitis, musculoskeletal neck pain, spinal fracture, and osteoarthritis.     All labs were reviewed and analyzed by me.  EKG was interpreted by supervising attending.  CT scan radiology interpretation was reviewed by me.    [facility]     Critical Care Note: Total Critical Care time of 30 minutes. Total critical care time documented does not include time spent on separately billed procedures for services of nurses or physician assistants. I personally saw and examined the patient. I have reviewed all diagnostic interpretations and treatment plans as written. I was present for the key portions of any procedures performed and the inclusive time noted in any critical care statement. Critical care time includes patient management by me, time spent at the patients bedside,  time to review lab and imaging results, discussing patient care, documentation in the medical record, and time spent with family or caregiver.    Patient Care Considerations:    CT CHEST: I considered ordering a CT scan of the chest, however denies CP, SOA, cough, palpitations      Consultants/Shared Management Plan:    Hospitalist: I have discussed the case with Dr. Meyer who agrees to accept the patient for admission.  Consultant: I have discussed the case with Dr. Booker with Neurosurgery who states pt should be admitted for observation, hold ASA, repeat imaging in AM    Social Determinants of Health:    Patient is independent, reliable, and has access to care.       Disposition and Care Coordination:    Admit:   Through independent evaluation of the  patient's history, physical, and imperical data, the patient meets criteria for observation/admission to the hospital.        Final diagnoses:   Subdural hemorrhage (HCC)        ED Disposition     ED Disposition   Decision to Admit    Condition   --    Comment   Level of Care: Critical Care [6]   Diagnosis: Subdural hematoma [[id number]]   Admitting Physician: BLUE JOHNSON [[id number]]   Attending Physician: BLUE JOHNSON [[id number]]               This medical record created using voice recognition software.           Mary Ferrari PA-C  02/08/23 0219

## 2023-02-08 NOTE — THERAPY EVALUATION
Patient Name: Rebecca Mchugh  : 1950    MRN: [id number]                              Today's Date: 2023       Admit Date: 2023    Visit Dx:     ICD-10-CM ICD-9-CM   1. Subdural hemorrhage (HCC)  I62.00 432.1   2. Difficulty walking  R26.2 719.7   3. Decreased activities of daily living (ADL)  Z78.9 V49.89     Patient Active Problem List   Diagnosis   • Trigger thumb of right hand   • Aftercare following right 1st TFR   • Osteoporosis   • Subdural hemorrhage (HCC)   • Subdural hematoma     Past Medical History:   Diagnosis Date   • Acid reflux    • Ankle fracture    • Bladder disorder    • Breast cancer (HCC)     Right   • Cancer (HCC)    • Colitis    • Diabetes (HCC)    • Diabetes mellitus (HCC)    • GERD (gastroesophageal reflux disease)    • History of spinal fracture 2020   • Hyperlipemia    • Recurrent UTI (urinary tract infection)    • Renal calculus or stone    • Seasonal allergies    • Trigger thumb of right hand 2021   • Urinary incontinence in female      Past Surgical History:   Procedure Laterality Date   • ABDOMINAL HYSTERECTOMY     • BACK SURGERY      HERNIATED DISCS  X3 OR 4 TIMES    • BLADDER REPAIR     • BREAST BIOPSY     • BREAST LUMPECTOMY Right     breast mass   • CHOLECYSTECTOMY     • COLONOSCOPY     • CYSTOSCOPY      with dilation   • GALLBLADDER SURGERY     • INTERSTIM PLACEMENT  ?   • INTRAOCULAR LENS INSERTION     • TRIGGER FINGER RELEASE Right 2021    Procedure: RIGHT FINGER TRIGGER THUMB RELEASE;  Surgeon: Eyad Griffith MD;  Location: [facility];  Service: Orthopedics;  Laterality: Right;   • VAGINAL MESH REVISION      vaginal approach per Dr. Liao      General Information     Row Name 23 1535          OT Time and Intention    Document Type evaluation  -LF     Mode of Treatment individual therapy;occupational therapy  -LF     Row Name 23 1535 23 1529       General Information    Patient Profile Reviewed yes  -LF --     Prior Level of Function --  (I) with ADLs, ambulated w/o a device but has a cane if needed, has a step over tub with built in seat, elevated commode, stands to groom, drives, and no home O2.  -LF --    Existing Precautions/Restrictions no known precautions/restrictions  -LF --    Barriers to Rehab none identified  -LF none identified  -LF    Row Name 02/08/23 1535 02/08/23 1529       Occupational Profile    Reason for Services/Referral (Occupational Profile) Patient is a 72 year old female admitted to [facility] status post fall on February 7th, 2023. Occupational therapy consulted due to recent decline in ADLs/functional transfers. No previous occupational therapy services for current condition.  -LF Patient is a 72 year old female admitted to [facility] status post fall on February 7th, 2023. Occupational therapy consulted due to recent decline in ADLs/functional transfers. No previous occupational therapy services for current condition.  -LF    Row Name 02/08/23 1535          Living Environment    People in Home alone  Family and friends able to assist if needed  -LF     Row Name 02/08/23 1535          Home Main Entrance    Number of Stairs, Main Entrance none  -LF     Row Name 02/08/23 1535 02/08/23 1529       Stairs Within Home, Primary    Number of Stairs, Within Home, Primary none  -LF none  -LF    Row Name 02/08/23 1535 02/08/23 1529       Cognition    Orientation Status (Cognition) oriented x 4  -LF oriented x 4  Simultaneous filing. User may be unaware of other data.  -LF    Row Name 02/08/23 1535 02/08/23 1529       Safety Issues, Functional Mobility    Impairments Affecting Function (Mobility) other (see comments)  N/A  -LF other (see comments)  N/A  -LF          User Key  (r) = Recorded By, (t) = Taken By, (c) = Cosigned By    Initials Name Provider Type    LF Annabelle Ayala OT Occupational Therapist                 Mobility/ADL's     Row Name 02/08/23 1537          Bed Mobility     Bed Mobility bed mobility (all) activities  -LF     All Activities, [place] (Bed Mobility) independent  -LF     Row Name 02/08/23 1537          Transfers    Transfers sit-stand transfer;stand-sit transfer  -LF     Row Name 02/08/23 1537          Sit-Stand Transfer    Sit-Stand [place] (Transfers) independent  -LF     Row Name 02/08/23 1537          Stand-Sit Transfer    Stand-Sit [place] (Transfers) independent  -     Row Name 02/08/23 1537          Functional Mobility    Functional Mobility- Ind. Level independent  -     Row Name 02/08/23 1537          Activities of Daily Living    BADL Assessment/Intervention other (see comments)  Pt is (I) with all BADLs at this time.  -LF           User Key  (r) = Recorded By, (t) = Taken By, (c) = Cosigned By    Initials Name Provider Type    LF Annabelle Ayala OT Occupational Therapist               Obj/Interventions     Row Name 02/08/23 1537          Sensory Assessment (Somatosensory)    Sensory Assessment (Somatosensory) UE sensation intact  -[facility] Name 02/08/23 1537          Vision Assessment/Intervention    Visual Impairment/Limitations WFL  -LF     Row Name 02/08/23 1537          Range of Motion Comprehensive    General Range of Motion bilateral upper extremity ROM WFL  -     Row Name 02/08/23 1537          Strength Comprehensive (MMT)    Comment, General Manual Muscle Testing (MMT) Assessment 5/5 BUEs  -     Row Name 02/08/23 1537          Motor Skills    Motor Skills coordination;functional endurance  -LF     Coordination WFL  -     Functional Endurance Good for BADLs  -     Row Name 02/08/23 1537          Balance    Balance Assessment sitting dynamic balance;standing dynamic balance  -LF     Dynamic Sitting Balance independent  -LF     Position, Sitting Balance unsupported;sitting edge of bed  -LF     Dynamic Standing Balance independent  -LF     Position/Device Used, Standing Balance unsupported  -LF           User Key  (r) =  Recorded By, (t) = Taken By, (c) = Cosigned By    Initials Name Provider Type     Annabelle Ayala, OT Occupational Therapist               Goals/Plan    No documentation.                Clinical Impression     Row Name 02/08/23 1538          Pain Assessment    Additional Documentation Pain Scale: FACES Pre/Post-Treatment (Group)  -     Row Name 02/08/23 1538          Pain Scale: FACES Pre/Post-Treatment    Pain: FACES Scale, Pretreatment 0-->no hurt  -LF     Posttreatment Pain Rating 0-->no hurt  -LF     Row Name 02/08/23 1538          Plan of Care Review    Plan of Care Reviewed With patient  -     Progress no change  -     Outcome Evaluation Patient does not present with any significant decline in function and does not require inpatient occupational therapy, therefore they will be discharged from services at this time. She is independent with all BADLs/functional transfers and it is recommended she discharge home when medically able to do so. Patient is aware and agreeable with treatment plan at this time.  -     Row Name 02/08/23 1538          Therapy Assessment/Plan (OT)    Patient/Family Therapy Goal Statement (OT) To d/c home.  -     Rehab Potential (OT) other (see comments)  N/A  -     Criteria for Skilled Therapeutic Interventions Met (OT) no problems identified which require skilled intervention  -     Therapy Frequency (OT) evaluation only  -     Row Name 02/08/23 1538          Therapy Plan Review/Discharge Plan (OT)    Anticipated Discharge Disposition (OT) home  -[facility] Name 02/08/23 1538          Vital Signs    O2 Delivery Pre Treatment nasal cannula  -     O2 Delivery Intra Treatment nasal cannula  -     O2 Delivery Post Treatment nasal cannula  -           User Key  (r) = Recorded By, (t) = Taken By, (c) = Cosigned By    Initials Name Provider Type     Annabelle Ayala, OT Occupational Therapist               Outcome Measures     Row Name 02/08/23 1539          How much  help from another is currently needed...    Putting on and taking off regular lower body clothing? 4  -LF     Bathing (including washing, rinsing, and drying) 4  -LF     Toileting (which includes using toilet bed pan or urinal) 4  -LF     Putting on and taking off regular upper body clothing 4  -LF     Taking care of personal grooming (such as brushing teeth) 4  -LF     Eating meals 4  -LF     AM-PAC 6 Clicks Score (OT) 24  -LF     Row Name 02/08/23 1539          Functional Assessment    Outcome Measure Options AM-PAC 6 Clicks Daily Activity (OT);Optimal Instrument  -LF     Row Name 02/08/23 1539          Optimal Instrument    Optimal Instrument Optimal - 3  -LF     Bending/Stooping 1  -LF     Standing 1  -LF     Reaching 1  -LF     From the list, choose the 3 activities you would most like to be able to do without any difficulty Bending/stooping;Standing;Reaching  -LF     Total Score Optimal - 3 3  -LF           User Key  (r) = Recorded By, (t) = Taken By, (c) = Cosigned By    Initials Name Provider Type     Annabelle Ayala OT Occupational Therapist                Occupational Therapy Education     Title: PT OT SLP Therapies (Done)     Topic: Occupational Therapy (Done)     Point: ADL training (Done)     Description:   Instruct learner(s) on proper safety adaptation and remediation techniques during self care or transfers.   Instruct in proper use of assistive devices.              Learning Progress Summary           Patient Acceptance, E,TB, VU by  at 2/8/2023 1539                   Point: Precautions (Done)     Description:   Instruct learner(s) on prescribed precautions during self-care and functional transfers.              Learning Progress Summary           Patient Acceptance, E,TB, VU by  at 2/8/2023 1539                   Point: Body mechanics (Done)     Description:   Instruct learner(s) on proper positioning and spine alignment during self-care, functional mobility activities and/or exercises.               Learning Progress Summary           Patient Acceptance, E,TB, VU by  at 2/8/2023 1539                               User Key     Initials Effective Dates Name Provider Type Discipline     06/16/21 -  Annabelle Ayala OT Occupational Therapist OT              OT Recommendation and Plan  Therapy Frequency (OT): evaluation only  Plan of Care Review  Plan of Care Reviewed With: patient  Progress: no change  Outcome Evaluation: Patient does not present with any significant decline in function and does not require inpatient occupational therapy, therefore they will be discharged from services at this time. She is independent with all BADLs/functional transfers and it is recommended she discharge home when medically able to do so. Patient is aware and agreeable with treatment plan at this time.     Time Calculation:    Time Calculation- OT     Row Name 02/08/23 1540             Time Calculation- OT    OT Received On 02/08/23  -LF         Untimed Charges    OT Eval/Re-eval Minutes 25  -LF         Total Minutes    Untimed Charges Total Minutes 25  -LF       Total Minutes 25  -LF            User Key  (r) = Recorded By, (t) = Taken By, (c) = Cosigned By    Initials Name Provider Type     Annabelle Ayala OT Occupational Therapist              Therapy Charges for Today     Code Description Service Date Service Provider Modifiers Qty    70540717291 HC OT EVAL LOW COMPLEXITY 2 2/8/2023 Annabelle Ayala OT GO 1               Annabelle Ayala OT  2/8/2023

## 2023-02-08 NOTE — PLAN OF CARE
Goal Outcome Evaluation:     Pt restarted on cardene to maintain systolic blood pressure less than 140. CT showed stable hemorrhage. Up ad wendy to bed side commode.

## 2023-02-08 NOTE — CONSULTS
Pulmonary / Critical Care Consult Note      Patient Name: Rebecca Mchugh  : 1950  MRN: [id number]  Primary Care Physician:  Provider, No Known  Referring Physician: Rod Muñoz DO  Date of admission: 2023    Subjective   Subjective     Reason for Consult/ Chief Complaint:   Acute subdural hematoma    HPI:  Rebecca Mchugh is a 72 y.o. female with history of GERD, HTN had a fall while walking her dog.  She hit her head but did not lose consciousness.  She came to the ED for further evaluation was found to have a small acute subdural hematoma in the parafalcine region.  Patient reports that she had been on a baby aspirin but no other anticoagulation or antiplatelet therapy.  Patient feels like she is at her neurologic baseline without focal deficits.  This morning she is doing well on 2 L nasal cannula.  She is on Cardene drip at 5 with a systolic blood pressure goal less than 140.  She is also on normal saline at 100 cc/h.  She will try to eat breakfast but does not find the food appealing.  Neurosurgery has been on board.  No other acute issues at this time.    Review of Systems  Constitutional symptoms:  Denied complaints   Ear, nose, throat: Denied complaints  Cardiovascular:  Denied complaints  Respiratory: Denied complaints  Gastrointestinal: Denied complaints  Musculoskeletal: Denied complaints  Genitourinary: Denied complaints  Allergy / Immunology: Denied complaints  Hematologic: Denied complaints  Neurologic: Denied complaints  Skin: Denied complaints  Endocrine: Denied complaints  Psychiatric: Denied complaints      Personal History     Past Medical History:   Diagnosis Date   • Acid reflux    • Ankle fracture    • Bladder disorder    • Breast cancer (HCC)     Right   • Cancer (HCC)    • Colitis    • Diabetes (HCC)    • Diabetes mellitus (HCC)    • GERD (gastroesophageal reflux disease)    • History of spinal fracture 2020   • Hyperlipemia    • Recurrent UTI (urinary tract infection)     • Renal calculus or stone    • Seasonal allergies    • Trigger thumb of right hand 6/17/2021   • Urinary incontinence in female        Past Surgical History:   Procedure Laterality Date   • ABDOMINAL HYSTERECTOMY     • BACK SURGERY      HERNIATED DISCS  X3 OR 4 TIMES    • BLADDER REPAIR  2002   • BREAST BIOPSY     • BREAST LUMPECTOMY Right     breast mass   • CHOLECYSTECTOMY     • COLONOSCOPY     • CYSTOSCOPY      with dilation   • GALLBLADDER SURGERY     • INTERSTIM PLACEMENT  2018-19?   • INTRAOCULAR LENS INSERTION     • TRIGGER FINGER RELEASE Right 6/17/2021    Procedure: RIGHT FINGER TRIGGER THUMB RELEASE;  Surgeon: Eyad Griffith MD;  Location: [facility];  Service: Orthopedics;  Laterality: Right;   • VAGINAL MESH REVISION      vaginal approach per Dr. Liao       Family History: family history includes Breast cancer in her mother; Cancer in her brother and mother; Diabetes in her brother; Heart disease in her brother. Otherwise pertinent FHx was reviewed and not pertinent to current issue.    Social History:  reports that she has quit smoking. Her smoking use included cigarettes. She smoked an average of 1 pack per day. She does not have any smokeless tobacco history on file. She reports that she does not drink alcohol and does not use drugs.    Home Medications:  HYDROcodone-acetaminophen, amitriptyline, atorvastatin, calcium carbonate, fesoterodine fumarate, levocetirizine, lidocaine, metFORMIN, multivitamin with minerals, and omeprazole    Allergies:  Allergies   Allergen Reactions   • Strawberry Hives       Objective    Objective     Vitals:   Temp:  [97.9 °F (36.6 °C)-98.5 °F (36.9 °C)] 98.3 °F (36.8 °C)  Heart Rate:  [73-94] 83  Resp:  [16-18] 16  BP: (104-183)/() 126/105  Flow (L/min):  [2] 2    Physical Exam:  Vital Signs Reviewed   General:  WDWN, Alert, NAD.  Elderly female.  Sitting up in bed   HEENT:  PERRL, EOMI.  OP, nares clear  Neck:  Supple, no JVD, no thyromegaly  Chest:  good  aeration, clear to auscultation bilaterally, tympanic to percussion bilaterally, no work of breathing noted on 2 L nasal cannula  CV: RRR, no MGR, pulses 2+, equal.  Abd:  Soft, NT, ND, + BS, no HSM  EXT:  no clubbing, no cyanosis, no edema  Neuro:  A&Ox3, CN grossly intact, no focal deficits.  Skin: No rashes or lesions noted      Result Review    Result Review:  I have personally reviewed the results from the time of this admission to 2/8/2023 11:24 EST and agree with these findings:  [x]  Laboratory  [x]  Microbiology  [x]  Radiology  []  EKG/Telemetry   []  Cardiology/Vascular   []  Pathology  []  Old records  []  Other:  Most notable findings include:     Assessment & Plan   Assessment / Plan     Active Hospital Problems:  Active Hospital Problems    Diagnosis    • **Subdural hemorrhage (HCC)    • Subdural hematoma      Impression:  Acute subdural hematoma status post mechanical fall  Hypertension  Hyponatremia  Hypomagnesemia, on admission    Plan:  Neuro  -neurologic exam unremarkable, no focal deficit   -Neurosurgery on board, pending repeat CT     Cardiovascular  -Continue Cardene drip with systolic blood pressure goal less than 140  -We will start amlodipine today    Pulmonary  -On 2 L nasal cannula  -Checks x-ray showed hilar fullness concerning for increased vascular congestion, will check BNP and 2D echo    GI  -Continue diet as tolerated    Renal  -Replace electrolytes PRN to keep K 4.0, Mag 2.0, Phos 4.0.     Endo  -Keep glucose 140-180 while in ICU. Cont SSI.    HemOnc  -transfuse to keep Hgb >7    ID  -No acute issues    DVT ppx: No AC due to subdural hematoma  GI ppx: PPI  Lines: Peripheral IVs    DVT prophylaxis:  Mechanical DVT prophylaxis orders are present.     Code Status and Medical Interventions:   Ordered at: 02/08/23 0152     Medical Intervention Limits:    NO intubation (DNI)     Level Of Support Discussed With:    Patient     Code Status (Patient has no pulse and is not breathing):    No  CPR (Do Not Attempt to Resuscitate)     Medical Interventions (Patient has pulse or is breathing):    Limited Support     Release to patient:    Routine Release        The patient is critically ill in the ICU with acute subdural hematoma status post fall, hypertension on Cardene drip. Multidisciplinary bedside critical care rounds were performed with nursing staff, respiratory therapy, pharmacy, nutritional services, social work. I have personally reviewed the chart, labs and any pertinent imaging available.  I have spent 33 minutes of critical care time, excluding procedures, in the care of this patient.    Electronically signed by Layla Garcia MD, 02/08/23, 11:24 AM EST.

## 2023-02-08 NOTE — ED NOTES
Called Charge for ultrasound IV.  That RN was able to start a 20 g in Left AC. Started Cardene in that IV but pump continued to say downstream occlusion.  Charge notified for another IV placement.

## 2023-02-08 NOTE — PLAN OF CARE
Goal Outcome Evaluation:      Patient came up from ED last night on Cardene gtt for subdural hemorrhage. Patient has been weaned off Cardene now. Patient wore 2L NC last night just for sleeping. Patient was able to independently to the bedside commode. Lab work was within normal range. Patient is eager to get discharged.

## 2023-02-08 NOTE — PLAN OF CARE
Goal Outcome Evaluation:  Plan of Care Reviewed With: patient        Progress: no change  Outcome Evaluation: Patient does not present with any significant decline in function and does not require inpatient occupational therapy, therefore they will be discharged from services at this time. She is independent with all BADLs/functional transfers and it is recommended she discharge home when medically able to do so. Patient is aware and agreeable with treatment plan at this time.

## 2023-02-09 ENCOUNTER — APPOINTMENT (OUTPATIENT)
Dept: CARDIOLOGY | Facility: HOSPITAL | Age: 73
DRG: 86 | End: 2023-02-09
Payer: MEDICARE

## 2023-02-09 LAB
ALBUMIN SERPL-MCNC: 3.3 G/DL (ref 3.5–5.2)
ALBUMIN/GLOB SERPL: 1.4 G/DL
ALP SERPL-CCNC: 89 U/L (ref 39–117)
ALT SERPL W P-5'-P-CCNC: 21 U/L (ref 1–33)
ANION GAP SERPL CALCULATED.3IONS-SCNC: 8.5 MMOL/L (ref 5–15)
ASCENDING AORTA: 3 CM
AST SERPL-CCNC: 16 U/L (ref 1–32)
BH CV ECHO MEAS - AO ROOT DIAM: 3.2 CM
BH CV ECHO MEAS - EDV(MOD-SP2): 51 ML
BH CV ECHO MEAS - EDV(MOD-SP4): 57 ML
BH CV ECHO MEAS - EF(MOD-BP): 65 %
BH CV ECHO MEAS - ESV(MOD-SP2): 18 ML
BH CV ECHO MEAS - ESV(MOD-SP4): 19 ML
BH CV ECHO MEAS - IVSD: 1.4 CM
BH CV ECHO MEAS - LA DIMENSION: 3.2 CM
BH CV ECHO MEAS - LAT PEAK E' VEL: 6.9 CM/SEC
BH CV ECHO MEAS - LVIDD: 3.4 CM
BH CV ECHO MEAS - LVIDS: 2 CM
BH CV ECHO MEAS - LVOT DIAM: 2 CM
BH CV ECHO MEAS - LVPWD: 1.3 CM
BH CV ECHO MEAS - MED PEAK E' VEL: 5.98 CM/SEC
BH CV ECHO MEAS - MV A MAX VEL: 122 CM/SEC
BH CV ECHO MEAS - MV DEC TIME: 306 MSEC
BH CV ECHO MEAS - MV E MAX VEL: 111 CM/SEC
BH CV ECHO MEAS - MV E/A: 0.9
BH CV ECHO MEAS - RVDD: 2.5 CM
BH CV ECHO MEASUREMENTS AVERAGE E/E' RATIO: 17.24
BILIRUB SERPL-MCNC: 0.3 MG/DL (ref 0–1.2)
BUN SERPL-MCNC: 8 MG/DL (ref 8–23)
BUN/CREAT SERPL: 6.9 (ref 7–25)
CALCIUM SPEC-SCNC: 8.9 MG/DL (ref 8.6–10.5)
CHLORIDE SERPL-SCNC: 102 MMOL/L (ref 98–107)
CO2 SERPL-SCNC: 22.5 MMOL/L (ref 22–29)
CREAT SERPL-MCNC: 1.16 MG/DL (ref 0.57–1)
DEPRECATED RDW RBC AUTO: 43.6 FL (ref 37–54)
EGFRCR SERPLBLD CKD-EPI 2021: 49.6 ML/MIN/1.73
ERYTHROCYTE [DISTWIDTH] IN BLOOD BY AUTOMATED COUNT: 14.3 % (ref 12.3–15.4)
GLOBULIN UR ELPH-MCNC: 2.4 GM/DL
GLUCOSE BLDC GLUCOMTR-MCNC: 129 MG/DL (ref 70–99)
GLUCOSE BLDC GLUCOMTR-MCNC: 139 MG/DL (ref 70–99)
GLUCOSE BLDC GLUCOMTR-MCNC: 179 MG/DL (ref 70–99)
GLUCOSE SERPL-MCNC: 98 MG/DL (ref 65–99)
HCT VFR BLD AUTO: 29.6 % (ref 34–46.6)
HGB BLD-MCNC: 10.1 G/DL (ref 12–15.9)
IVRT: 87 MSEC
LEFT ATRIUM VOLUME INDEX: 27.6 ML/M2
MAGNESIUM SERPL-MCNC: 1.9 MG/DL (ref 1.6–2.4)
MAXIMAL PREDICTED HEART RATE: 146 BPM
MCH RBC QN AUTO: 28.7 PG (ref 26.6–33)
MCHC RBC AUTO-ENTMCNC: 34.1 G/DL (ref 31.5–35.7)
MCV RBC AUTO: 84.1 FL (ref 79–97)
PHOSPHATE SERPL-MCNC: 3.9 MG/DL (ref 2.5–4.5)
PLATELET # BLD AUTO: 223 10*3/MM3 (ref 140–450)
PMV BLD AUTO: 9 FL (ref 6–12)
POTASSIUM SERPL-SCNC: 4 MMOL/L (ref 3.5–5.2)
PROT SERPL-MCNC: 5.7 G/DL (ref 6–8.5)
RBC # BLD AUTO: 3.52 10*6/MM3 (ref 3.77–5.28)
SODIUM SERPL-SCNC: 133 MMOL/L (ref 136–145)
STRESS TARGET HR: 124 BPM
WBC NRBC COR # BLD: 4.03 10*3/MM3 (ref 3.4–10.8)

## 2023-02-09 PROCEDURE — 93306 TTE W/DOPPLER COMPLETE: CPT | Performed by: INTERNAL MEDICINE

## 2023-02-09 PROCEDURE — 84100 ASSAY OF PHOSPHORUS: CPT | Performed by: PHYSICIAN ASSISTANT

## 2023-02-09 PROCEDURE — 93306 TTE W/DOPPLER COMPLETE: CPT

## 2023-02-09 PROCEDURE — 63710000001 INSULIN REGULAR HUMAN PER 5 UNITS: Performed by: INTERNAL MEDICINE

## 2023-02-09 PROCEDURE — 99232 SBSQ HOSP IP/OBS MODERATE 35: CPT | Performed by: FAMILY MEDICINE

## 2023-02-09 PROCEDURE — 83735 ASSAY OF MAGNESIUM: CPT | Performed by: PHYSICIAN ASSISTANT

## 2023-02-09 PROCEDURE — 99213 OFFICE O/P EST LOW 20 MIN: CPT | Performed by: NEUROLOGICAL SURGERY

## 2023-02-09 PROCEDURE — 99291 CRITICAL CARE FIRST HOUR: CPT | Performed by: STUDENT IN AN ORGANIZED HEALTH CARE EDUCATION/TRAINING PROGRAM

## 2023-02-09 PROCEDURE — 80053 COMPREHEN METABOLIC PANEL: CPT | Performed by: PHYSICIAN ASSISTANT

## 2023-02-09 PROCEDURE — 82962 GLUCOSE BLOOD TEST: CPT

## 2023-02-09 PROCEDURE — 85027 COMPLETE CBC AUTOMATED: CPT | Performed by: PHYSICIAN ASSISTANT

## 2023-02-09 RX ORDER — HYDRALAZINE HYDROCHLORIDE 20 MG/ML
10 INJECTION INTRAMUSCULAR; INTRAVENOUS EVERY 4 HOURS PRN
Status: DISCONTINUED | OUTPATIENT
Start: 2023-02-09 | End: 2023-02-10 | Stop reason: HOSPADM

## 2023-02-09 RX ORDER — LISINOPRIL 10 MG/1
10 TABLET ORAL
Status: DISCONTINUED | OUTPATIENT
Start: 2023-02-09 | End: 2023-02-10 | Stop reason: HOSPADM

## 2023-02-09 RX ORDER — LEVOTHYROXINE SODIUM 0.03 MG/1
25 TABLET ORAL
Status: DISCONTINUED | OUTPATIENT
Start: 2023-02-10 | End: 2023-02-10 | Stop reason: HOSPADM

## 2023-02-09 RX ADMIN — INSULIN HUMAN 2 UNITS: 100 INJECTION, SOLUTION PARENTERAL at 12:35

## 2023-02-09 RX ADMIN — CARVEDILOL 6.25 MG: 6.25 TABLET, FILM COATED ORAL at 18:28

## 2023-02-09 RX ADMIN — PANTOPRAZOLE SODIUM 40 MG: 40 TABLET, DELAYED RELEASE ORAL at 06:05

## 2023-02-09 RX ADMIN — Medication 10 ML: at 20:44

## 2023-02-09 RX ADMIN — CARVEDILOL 6.25 MG: 6.25 TABLET, FILM COATED ORAL at 09:14

## 2023-02-09 RX ADMIN — SENNOSIDES AND DOCUSATE SODIUM 2 TABLET: 8.6; 5 TABLET ORAL at 09:14

## 2023-02-09 RX ADMIN — LISINOPRIL 10 MG: 10 TABLET ORAL at 13:45

## 2023-02-09 RX ADMIN — Medication 10 ML: at 10:18

## 2023-02-09 RX ADMIN — AMLODIPINE BESYLATE 10 MG: 10 TABLET ORAL at 09:14

## 2023-02-09 RX ADMIN — Medication 10 ML: at 03:42

## 2023-02-09 NOTE — PROGRESS NOTES
Psychiatric   Neurosurgery Progress Note    Patient Name: Rebecca Mchugh  : 1950  MRN: [id number]  Date of admission: 2023  Surgical Procedures Since Admission:    Subjective   Subjective     Chief Complaint: No new complaints.  Desires to go home.    History of Present Illness   Falcine subdural hematoma was stable on repeat CT scan yesterday.  She denies any headache or other issues at the present time.  She is hopeful to go home.      Objective   Objective     Vitals:   Temp:  [98.2 °F (36.8 °C)-99.4 °F (37.4 °C)] 98.3 °F (36.8 °C)  Heart Rate:  [69-90] 74  Resp:  [16] 16  BP: ()/() 122/55  Flow (L/min):  [1-2] 2    She is awake and alert.  She is oriented.         Assessment & Plan   Assessment / Plan     Brief Patient Summary:  Rebecca Mchugh is a 72 y.o. female who has a falcine subdural hematoma stable on CT.    Active Hospital Problems:  Active Hospital Problems    Diagnosis    • **Subdural hemorrhage (HCC)    • Subdural hematoma      Plan:   From my standpoint she could be discharged home once medically stable.  She can get a head CT scan in 2 weeks and I can review this in my office.  If there are any abnormal findings we can see her in follow-up

## 2023-02-09 NOTE — PROGRESS NOTES
[facility]   Hospitalist Progress Note  Date: 2023  Patient Name: Rebecca Mchugh  : 1950  MRN: [id number]  Date of admission: 2023      Subjective   Subjective       Summary: 72-year-old female former smoker with history of breast cancer, DM, HLD presents following mechanical fall.  She was walking her dog when she tripped on the pavement, falling and hitting her back and head.  She denies dizziness, confusion, loss of consciousness.  She denies headache but does have neck pain.  She has no current dizziness, confusion, change in vision.  She denies any recent illness; denies fevers, chills, chest pain, shortness of breath, abdominal pain, pain with urination, change in bowel habits.    Interval Followup: continues to require cardene drip to control bp      Objective   Objective     Vitals:   Temp:  [97.9 °F (36.6 °C)-99.4 °F (37.4 °C)] 99.2 °F (37.3 °C)  Heart Rate:  [72-94] 75  Resp:  [16] 16  BP: ()/() 126/57  Flow (L/min):  [1-2] 2  Physical Exam    Constitutional: Awake, alert, no acute distress   Eyes: Pupils equal, sclerae anicteric, no conjunctival injection   HENT: NCAT, mucous membranes moist   Neck: Supple, no thyromegaly, no lymphadenopathy, trachea midline   Respiratory: Clear to auscultation bilaterally, nonlabored respirations    Cardiovascular: RRR, no murmurs, rubs, or gallops, palpable pedal pulses bilaterally   Gastrointestinal: Positive bowel sounds, soft, nontender, nondistended   Musculoskeletal: No bilateral ankle edema, no clubbing or cyanosis to extremities   Psychiatric: Appropriate affect, cooperative   Neurologic: Oriented x 3, strength symmetric in all extremities, Cranial Nerves grossly intact to confrontation, speech clear   Skin: No rashes         Assessment & Plan   Assessment / Plan     Assessment/Plan:  Subdural hemorrhage  -Fall with head trauma  -Dr. Booker consulted                -Hold aspirin              -Control BP.  Continue cardene drip.  Remain in CCU     -Repeat CT head       Discussed plan with RN.    DVT prophylaxis:  Mechanical DVT prophylaxis orders are present.    CODE STATUS:   Medical Intervention Limits: NO intubation (DNI)  Level Of Support Discussed With: Patient  Code Status (Patient has no pulse and is not breathing): No CPR (Do Not Attempt to Resuscitate)  Medical Interventions (Patient has pulse or is breathing): Limited Support  Release to patient: Routine Release

## 2023-02-09 NOTE — PLAN OF CARE
Goal Outcome Evaluation:      Cardene gtt has been stopped all night. Patient was independent to the BSC. 2LNC. Patient eager to go home. No neurostatus changes. Patient remains alert and oriented with no changes in vision. Anticipate the patient to be discharged today or moved out of the unit. Continue with current POC.

## 2023-02-09 NOTE — PROGRESS NOTES
[facility]   Hospitalist Progress Note  Date: 2023  Patient Name: Rebecca Mchugh  : 1950  MRN: [id number]  Date of admission: 2023      Subjective   Subjective     Chief Complaint: follow up Subdural hemorrhage    Summary: 72-year-old female former smoker with history of breast cancer, DM, HLD presents following mechanical fall with head trauma.  Found to have acute subdural hemorrhage    Interval Followup: repeat CT without expansion of bleeding.  No headache.  Blood pressure has been running >140 systolic today since turning off the Cardene drip        Objective   Objective     Vitals:   Temp:  [98.2 °F (36.8 °C)-99.4 °F (37.4 °C)] 98.7 °F (37.1 °C)  Heart Rate:  [69-90] 77  Resp:  [16] 16  BP: ()/(51-88) 145/77  Flow (L/min):  [2] 2  Physical Exam    Constitutional: Awake, alert, no acute distress   Eyes: Pupils equal, sclerae anicteric, no conjunctival injection   HENT: NCAT, mucous membranes moist   Neck: Supple, no thyromegaly, no lymphadenopathy, trachea midline   Respiratory: Clear to auscultation bilaterally, nonlabored respirations    Cardiovascular: RRR, no murmurs, rubs, or gallops, palpable pedal pulses bilaterally   Gastrointestinal: Positive bowel sounds, soft, nontender, nondistended   Musculoskeletal: No bilateral ankle edema, no clubbing or cyanosis to extremities   Psychiatric: Appropriate affect, cooperative   Neurologic: Oriented x 3, speech clear   Skin: No rashes         Assessment & Plan   Assessment / Plan     Assessment/Plan:  -Subdural hemorrhage  -Fall with head trauma                -Hold aspirin              -Control BP.  Cardene drip dc'd but still hypertensive above goal.  Start Lisinopril in addition to current orals                -Repeat CT head reviewed and negative for expansion of bleeding     -transfer out of ICU to monitored bed.     - I can't discharge her today because her blood pressure is above goal     Discussed plan with RN.    DVT  prophylaxis:  Mechanical DVT prophylaxis orders are present.    CODE STATUS:   Medical Intervention Limits: NO intubation (DNI)  Level Of Support Discussed With: Patient  Code Status (Patient has no pulse and is not breathing): No CPR (Do Not Attempt to Resuscitate)  Medical Interventions (Patient has pulse or is breathing): Limited Support  Release to patient: Routine Release

## 2023-02-09 NOTE — PLAN OF CARE
Goal Outcome Evaluation:      Pt. transferred from ccu to pcu this afternoon. Pt. Remains Aox4, no acute changes noted in imaging, tolerating diet well, up with stand by assistance to bathroom, started coreg this afternoon for HTN. Education material provided. Andreian Roberson RN

## 2023-02-09 NOTE — PROGRESS NOTES
Pulmonary / Critical Care Progress Note      Patient Name: Rebecca Mchugh  : 1950  MRN: [id number]  Attending:  Rod Muñoz DO   Date of admission: 2023    Subjective   Subjective   Patient critically ill with acute CVA    Over past 24 hours:  Cardene drip has been off since 7 PM yesterday  Remains hemodynamically stable  No acute events overnight  No changes in neuro status    Review of Systems  Constitutional symptoms:  Denied complaints   Ear, nose, throat: Denied complaints  Cardiovascular:  Denied complaints  Respiratory: Denied complaints  Gastrointestinal: Denied complaints  Musculoskeletal: Denied complaints  Neurologic: Denied complaints  Skin: Denied complaints        Objective   Objective     Vitals:   Vital signs for last 24 hours:  Temp:  [98.2 °F (36.8 °C)-99.4 °F (37.4 °C)] 99 °F (37.2 °C)  Heart Rate:  [69-90] 75  Resp:  [16] 16  BP: ()/(51-94) 148/70    Intake/Output last 3 shifts:  I/O last 3 completed shifts:  In: 545 [I.V.:545]  Out: 3625 [Urine:3625]  Intake/Output this shift:  I/O this shift:  In: -   Out: 250 [Urine:250]    Vent settings for last 24 hours:       Hemodynamic parameters for last 24 hours:       Physical Exam   Vital Signs Reviewed   General:  Alert, NAD.    HEENT:  PERRL, EOMI.  OP  Chest:  Clear to auscultation bilaterally, no work of breathing noted on room air  CV: RRR, no MGR, pulses 2+, equal.  Abd:  Soft, NT, ND, + BS  EXT:  no clubbing, no cyanosis, no edema  Neuro:  A&Ox3, CN grossly intact, no focal deficits.  Skin: No rashes or lesions noted      Result Review    Result Review:  I have personally reviewed the results from the time of this admission to 2023 12:11 EST and agree with these findings:  [x]  Laboratory  [x]  Microbiology  [x]  Radiology  []  EKG/Telemetry   []  Cardiology/Vascular   []  Pathology  []  Old records  []  Other:  Most notable findings include:   -    Assessment & Plan   Assessment / Plan     Active Hospital  Problems:  Active Hospital Problems    Diagnosis    • **Subdural hemorrhage (HCC)    • Subdural hematoma          Impression:  Acute subdural hematoma status post mechanical fall  Hypertension  Hyponatremia  Anemia     Plan:  Neuro  -neurologic exam unremarkable, no focal deficit   -Neurosurgery on board     Cardiovascular  -Cardene drip with systolic blood pressure goal less than 140  -We will start amlodipine today     Pulmonary  -on room air  -Checks x-ray showed hilar fullness concerning for increased vascular congestion, f/ u 2D echo     GI  -Continue diet as tolerated     Renal  -Replace electrolytes PRN to keep K 4.0, Mag 2.0, Phos 4.0.     Endo  -Keep glucose 140-180 while in ICU. Cont SSI.     HemOnc  -transfuse to keep Hgb >7     ID  -No acute issues     DVT ppx: No AC due to subdural hematoma  GI ppx: PPI  Lines: Peripheral IVs       DVT prophylaxis:  Mechanical DVT prophylaxis orders are present.    CODE STATUS:   Medical Intervention Limits: NO intubation (DNI)  Level Of Support Discussed With: Patient  Code Status (Patient has no pulse and is not breathing): No CPR (Do Not Attempt to Resuscitate)  Medical Interventions (Patient has pulse or is breathing): Limited Support  Release to patient: Routine Release    Patient is critically ill in the ICU with acute CVA, hypertension requiring Cardene drip. 32 minutes of critical care time was spent managing this patient, excluding procedures. This included personally reviewing all pertinent labs, imaging, microbiology and documentation. Also discussing the case with the patient and any available family, the admitting physician and any available ancillary staff.     Electronically signed by Layla Garcia MD, 02/09/23, 12:11 PM EST.

## 2023-02-10 ENCOUNTER — TRANSCRIBE ORDERS (OUTPATIENT)
Dept: ADMINISTRATIVE | Facility: HOSPITAL | Age: 73
End: 2023-02-10
Payer: MEDICARE

## 2023-02-10 VITALS
WEIGHT: 142.86 LBS | DIASTOLIC BLOOD PRESSURE: 43 MMHG | HEART RATE: 68 BPM | BODY MASS INDEX: 24.39 KG/M2 | TEMPERATURE: 98.6 F | HEIGHT: 64 IN | SYSTOLIC BLOOD PRESSURE: 109 MMHG | OXYGEN SATURATION: 96 % | RESPIRATION RATE: 17 BRPM

## 2023-02-10 DIAGNOSIS — Z12.31 SCREENING MAMMOGRAM FOR BREAST CANCER: Primary | ICD-10-CM

## 2023-02-10 LAB
ANION GAP SERPL CALCULATED.3IONS-SCNC: 10.3 MMOL/L (ref 5–15)
BASOPHILS # BLD AUTO: 0.05 10*3/MM3 (ref 0–0.2)
BASOPHILS NFR BLD AUTO: 1.3 % (ref 0–1.5)
BUN SERPL-MCNC: 11 MG/DL (ref 8–23)
BUN/CREAT SERPL: 10.1 (ref 7–25)
CALCIUM SPEC-SCNC: 9.3 MG/DL (ref 8.6–10.5)
CHLORIDE SERPL-SCNC: 102 MMOL/L (ref 98–107)
CO2 SERPL-SCNC: 20.7 MMOL/L (ref 22–29)
CREAT SERPL-MCNC: 1.09 MG/DL (ref 0.57–1)
DEPRECATED RDW RBC AUTO: 45 FL (ref 37–54)
EGFRCR SERPLBLD CKD-EPI 2021: 53.4 ML/MIN/1.73
EOSINOPHIL # BLD AUTO: 0.35 10*3/MM3 (ref 0–0.4)
EOSINOPHIL NFR BLD AUTO: 8.8 % (ref 0.3–6.2)
ERYTHROCYTE [DISTWIDTH] IN BLOOD BY AUTOMATED COUNT: 14.3 % (ref 12.3–15.4)
GLUCOSE BLDC GLUCOMTR-MCNC: 117 MG/DL (ref 70–99)
GLUCOSE SERPL-MCNC: 121 MG/DL (ref 65–99)
HCT VFR BLD AUTO: 34.3 % (ref 34–46.6)
HGB BLD-MCNC: 11.3 G/DL (ref 12–15.9)
IMM GRANULOCYTES # BLD AUTO: 0.01 10*3/MM3 (ref 0–0.05)
IMM GRANULOCYTES NFR BLD AUTO: 0.3 % (ref 0–0.5)
LYMPHOCYTES # BLD AUTO: 1.48 10*3/MM3 (ref 0.7–3.1)
LYMPHOCYTES NFR BLD AUTO: 37.3 % (ref 19.6–45.3)
MCH RBC QN AUTO: 28.3 PG (ref 26.6–33)
MCHC RBC AUTO-ENTMCNC: 32.9 G/DL (ref 31.5–35.7)
MCV RBC AUTO: 86 FL (ref 79–97)
MONOCYTES # BLD AUTO: 0.46 10*3/MM3 (ref 0.1–0.9)
MONOCYTES NFR BLD AUTO: 11.6 % (ref 5–12)
NEUTROPHILS NFR BLD AUTO: 1.62 10*3/MM3 (ref 1.7–7)
NEUTROPHILS NFR BLD AUTO: 40.7 % (ref 42.7–76)
NRBC BLD AUTO-RTO: 0 /100 WBC (ref 0–0.2)
PLATELET # BLD AUTO: 239 10*3/MM3 (ref 140–450)
PMV BLD AUTO: 8.9 FL (ref 6–12)
POTASSIUM SERPL-SCNC: 4.2 MMOL/L (ref 3.5–5.2)
RBC # BLD AUTO: 3.99 10*6/MM3 (ref 3.77–5.28)
SODIUM SERPL-SCNC: 133 MMOL/L (ref 136–145)
WBC NRBC COR # BLD: 3.97 10*3/MM3 (ref 3.4–10.8)

## 2023-02-10 PROCEDURE — 80048 BASIC METABOLIC PNL TOTAL CA: CPT | Performed by: FAMILY MEDICINE

## 2023-02-10 PROCEDURE — 99232 SBSQ HOSP IP/OBS MODERATE 35: CPT | Performed by: INTERNAL MEDICINE

## 2023-02-10 PROCEDURE — 82962 GLUCOSE BLOOD TEST: CPT

## 2023-02-10 PROCEDURE — 85025 COMPLETE CBC W/AUTO DIFF WBC: CPT | Performed by: FAMILY MEDICINE

## 2023-02-10 RX ORDER — LISINOPRIL 10 MG/1
10 TABLET ORAL
Qty: 30 TABLET | Refills: 0 | Status: SHIPPED | OUTPATIENT
Start: 2023-02-10

## 2023-02-10 RX ORDER — AMLODIPINE BESYLATE 10 MG/1
10 TABLET ORAL
Qty: 30 TABLET | Refills: 0 | Status: SHIPPED | OUTPATIENT
Start: 2023-02-10

## 2023-02-10 RX ORDER — CARVEDILOL 6.25 MG/1
6.25 TABLET ORAL 2 TIMES DAILY WITH MEALS
Qty: 60 TABLET | Refills: 0 | Status: SHIPPED | OUTPATIENT
Start: 2023-02-10

## 2023-02-10 RX ADMIN — Medication 10 ML: at 09:45

## 2023-02-10 RX ADMIN — PANTOPRAZOLE SODIUM 40 MG: 40 TABLET, DELAYED RELEASE ORAL at 05:37

## 2023-02-10 RX ADMIN — CARVEDILOL 6.25 MG: 6.25 TABLET, FILM COATED ORAL at 09:44

## 2023-02-10 RX ADMIN — AMLODIPINE BESYLATE 10 MG: 10 TABLET ORAL at 09:44

## 2023-02-10 RX ADMIN — LEVOTHYROXINE SODIUM 25 MCG: 25 TABLET ORAL at 05:37

## 2023-02-10 NOTE — DISCHARGE SUMMARY
[facility]  HOSPITALIST  DISCHARGE SUMMARY       Patient Name: Rebecca Mchugh  : 1950  MRN: [id number]  Primary Care Physician: Colin Jamil MD    Date of Admission: 2023  Date of Discharge: 2/10/2023    Discharge Diagnoses   • Status post trip and fall  • Subdural hematoma  • HTN  • DM  • Dyslipidemia  Hospital Course   Hospital Course:  Rebecca Mchugh is a pleasant 72 y.o. female who tripped while walking her dog and fell.  She hit her head on the floor.  She did not lose consciousness.  She was brought to the ED and subdural hemorrhage/hematoma was noted on CT imaging.  She was admitted to the hospital for further evaluation and management.  Her blood pressure was very high.  Initially she was in ICU.  Neurosurgeon was consulted.  Follow-up imaging did not show any extension/worsening of hemorrhage/hematoma.  She was placed on Cardene drip initially to help with BP, then started on oral antihypertensives as well.  Blood pressure improved.  She had no neurological deficits.  She was moved out of ICU.  Over the following day she remained medically stable.  Blood pressure within normal limits.  She is very eager to be discharged home.  She has been cleared by neurosurgery for discharge.  She will be on antihypertensive regimen as listed below.  She has been instructed not to take any aspirin/blood thinner medicines until follow-up.  She will be following up with PCP in 1 week.  She is to take blood pressure readings at home and discuss those readings with her PCP at follow-up.  Additionally, neurosurgeon wants a repeat CT of the head in 2 weeks postdischarge.  This has been ordered.  She will be following up with neurosurgeon 3-4 weeks postdischarge.       Discharge Follow Up / Recommendations (labs, diagnostics, meds, etc):   • As above  Consultants     Consults     Date and Time Order Name Status Description    2023  8:19 AM Inpatient Intensivist Consult Completed     2023   9:50 PM Inpatient Neurosurgery Consult Completed     2/7/2023  9:04 PM Inpatient Hospitalist Consult      2/7/2023  8:59 PM IP Consult to Neurosurgery        •   On Day of Discharge   VS: Temp:  [97.7 °F (36.5 °C)-98.7 °F (37.1 °C)] 98.6 °F (37 °C)  Heart Rate:  [64-80] 68  Resp:  [16-19] 17  BP: (109-145)/(43-90) 109/43  Flow (L/min):  [2] 2  EXAM:  (refer to progress note from 2/10/2023)     Discharge Medications      New Medications      Instructions Start Date   amLODIPine 10 MG tablet  Commonly known as: NORVASC   10 mg, Oral, Every 24 Hours Scheduled      carvedilol 6.25 MG tablet  Commonly known as: COREG   6.25 mg, Oral, 2 Times Daily With Meals      lisinopril 10 MG tablet  Commonly known as: PRINIVIL,ZESTRIL   10 mg, Oral, Every 24 Hours Scheduled         Continue These Medications      Instructions Start Date   amitriptyline 25 MG tablet  Commonly known as: ELAVIL   25 mg, Oral, Nightly      atorvastatin 40 MG tablet  Commonly known as: LIPITOR   40 mg, Oral, Daily      calcium carbonate 600 MG tablet  Commonly known as: OS-CHAPARRO   600 mg, Oral, 2 Times Daily With Meals      cyclobenzaprine 10 MG tablet  Commonly known as: FLEXERIL   10 mg, Oral, 3 Times Daily PRN      fesoterodine fumarate 8 MG tablet sustained-release 24 hour tablet  Commonly known as: TOVIAZ ER   8 mg, Oral, Every 24 Hours Scheduled      HYDROcodone-acetaminophen 7.5-325 MG per tablet  Commonly known as: NORCO   1 tablet, Oral, Every 6 Hours PRN      levocetirizine 5 MG tablet  Commonly known as: XYZAL   5 mg, Oral, Every Evening      levothyroxine 25 MCG tablet  Commonly known as: SYNTHROID, LEVOTHROID   25 mcg, Oral, Every Early Morning      lidocaine 5 %  Commonly known as: Lidoderm   1 patch, Transdermal, Every 24 Hours, Remove & Discard patch within 12 hours or as directed by MD      lubiprostone 24 MCG capsule  Commonly known as: AMITIZA   24 mcg, Oral, 2 Times Daily With Meals      metFORMIN  MG 24 hr tablet  Commonly known  as: GLUCOPHAGE-XR   500 mg, Oral, 2 Times Daily      multivitamin with minerals tablet tablet   1 tablet, Oral, Daily      nystatin 100,000 unit/mL suspension  Commonly known as: MYCOSTATIN   500,000 Units, Swish & Swallow, 4 Times Daily      olopatadine 0.1 % ophthalmic solution  Commonly known as: PATANOL   1 drop, 2 Times Daily      omeprazole 40 MG capsule  Commonly known as: priLOSEC   40 mg, Oral, Daily      sulfamethoxazole-trimethoprim 800-160 MG per tablet  Commonly known as: BACTRIM DS,SEPTRA DS   1 tablet, Oral, 2 Times Daily      traMADol 50 MG tablet  Commonly known as: ULTRAM   50 mg, Oral, 2 Times Daily PRN      traZODone 100 MG tablet  Commonly known as: DESYREL   100 mg, Oral, Nightly      Trulicity 0.75 MG/0.5ML solution pen-injector  Generic drug: Dulaglutide   0.75 mg, Subcutaneous           Procedures   None  Imaging       Adult Transthoracic Echo Complete W/ Cont if Necessary Per Protocol    Result Date: 2/9/2023  •  Left ventricular systolic function is normal. Left ventricular ejection fraction appears to be 61 - 65%. •  Left ventricular wall thickness is consistent with mild concentric hypertrophy. •  Left ventricular diastolic function is consistent with (grade I) impaired relaxation. •  Aortic valvular sclerosis noted.  There is no hemodynamically significant stenosis or regurgitation.     CT Head Without Contrast    Result Date: 2/8/2023  PROCEDURE: CT HEAD WO CONTRAST  COMPARISON:  [facility], CT, CT HEAD WO CONTRAST, 2/07/2023, 20:21. INDICATIONS: Falcine SDH  PROTOCOL:   Standard imaging protocol performed    RADIATION:   DLP: 850mGy*cm   MA and/or KV was adjusted to minimize radiation dose.     TECHNIQUE: After obtaining the patient's consent, CT images were obtained without non-ionic intravenous contrast material.  FINDINGS:  There is acute subdural hemorrhage along the anterior falx to the right of midline in the frontal region.  The hemorrhage measures 3.4 cm AP by  0.6 cm transverse, grossly similar to the previous study.  No new hemorrhage is identified in the interval.  There is no significant mass effect.  The ventricles are normal in size and configuration.  No focal brain lesion is evident.  No focal calvarial abnormality is seen.  No fractures evident.        1. Stable para-falcine subdural hemorrhage in the anterior frontal region, as above     Clayton Lerma M.D.       Electronically Signed and Approved By: Clayton Lerma M.D. on 2/08/2023 at 15:25             CT Head Without Contrast    Result Date: 2/7/2023  PROCEDURE: CT HEAD WO CONTRAST  COMPARISON:  None INDICATIONS: headache  PROTOCOL:   Standard imaging protocol performed    RADIATION:   DLP: 1080mGy*cm   MA and/or KV was adjusted to minimize radiation dose.     TECHNIQUE: After obtaining the patient's consent, CT images were obtained without non-ionic intravenous contrast material.  FINDINGS:  There is acute subdural hemorrhage along the anterior falx measuring up to 3.6 cm AP and 0.6 cm in thickness.  No other intracranial hemorrhage is identified.  The ventricles are normal in size and configuration.  Mild low density in the white matter is consistent with small vessel ischemic disease.  No fracture is identified.        1. No fracture demonstrated 2. Mild small vessel ischemic changes in the white matter 3. Parafalcine acute subdural hemorrhage as detailed above    I called a report to Dr. Santiago.     Clayton Lerma M.D.       Electronically Signed and Approved By: Clayton Lerma M.D. on 2/07/2023 at 20:51             CT Cervical Spine Without Contrast    Result Date: 2/7/2023  PROCEDURE: CT CERVICAL SPINE WO CONTRAST  COMPARISON: None  INDICATIONS: fall  PROTOCOL:   Standard imaging protocol performed    RADIATION:   DLP: 323.7mGy*cm   MA and/or KV was adjusted to minimize radiation dose.     TECHNIQUE: After obtaining the patient's consent, multi-planar CT images were created without contrast material.    FINDINGS:  There is been a previous right C4 laminectomy.  No acute fracture or malalignment is identified.  There is moderate degenerative disc disease in the mid and lower cervical spine.  Ground-glass opacities in the upper lung fields may be secondary to chronic interstitial lung disease.  Correlate clinically.        1. No acute cervical spine fracture or malalignment     Clayton Lerma M.D.       Electronically Signed and Approved By: Clayton Lerma M.D. on 2/07/2023 at 20:54             XR Chest 1 View    Result Date: 2/7/2023  PROCEDURE: XR CHEST 1 VW  COMPARISON: [facility], , CHEST AP/PA 1 VIEW, 9/26/2019, 23:21.  INDICATIONS: Weak/Dizzy/AMS  FINDINGS:  Prominent interstitial markings are noted, likely related to chronic interstitial lung disease.  There is increased density projecting over the right lung base/infrahilar region.  No effusion is seen.  The cardiac and mediastinal silhouettes appear normal.  Multiple surgical clips project over the right axilla.        1. Increased density in the right infrahilar/basilar region.  This is suspected to at least in part represent bronchovascular crowding although superimposed pneumonia or atelectasis is not excluded.       Clayton Lerma M.D.       Electronically Signed and Approved By: Clayton Lerma M.D. on 2/07/2023 at 20:45             Discharge Details   Hospital Diet:     Diet Order   Procedures   • Diet: Diabetic Diets; Consistent Carbohydrate; Texture: Regular Texture (IDDSI 7); Fluid Consistency: Thin (IDDSI 0)     CODE STATUS:    Code Status and Medical Interventions:   Ordered at: 02/08/23 0152     Medical Intervention Limits:    NO intubation (DNI)     Level Of Support Discussed With:    Patient     Code Status (Patient has no pulse and is not breathing):    No CPR (Do Not Attempt to Resuscitate)     Medical Interventions (Patient has pulse or is breathing):    Limited Support     Release to patient:    Routine Release     Additional  Instructions for the Follow-ups that You Need to Schedule     Discharge Follow-up with PCP   As directed       Currently Documented PCP:    Provider, No Known    PCP Phone Number:    None     Follow Up Details: 1 week         Discharge Follow-up with Specified Provider: Dr. Booker neurosurgeon; 1 Month   As directed      To: Dr. Booker neurosurgeon    Follow Up: 1 Month         CT Head Without Contrast   Feb 24, 2023      Follow up CT head in 2 weeks post discharge from hospital, per Dr. ALECIA Booker    Order Comments: Follow up CT head in 2 weeks post discharge from hospital, per Dr. ALECIA Booker              Discharge Disposition: Home or Self Care  Pertinent  Labs   LAB RESULTS:      Lab 02/10/23  0659 02/09/23  0308 02/08/23  0344 02/07/23 2001   WBC 3.97 4.03 5.14 5.30   HEMOGLOBIN 11.3* 10.1* 10.3* 11.1*   HEMATOCRIT 34.3 29.6* 30.6* 33.4*   PLATELETS 239 223 248 264   NEUTROS ABS 1.62*  --   --  3.68   IMMATURE GRANS (ABS) 0.01  --   --  0.02   LYMPHS ABS 1.48  --   --  0.90   MONOS ABS 0.46  --   --  0.48   EOS ABS 0.35  --   --  0.18   MCV 86.0 84.1 84.3 86.3         Lab 02/10/23  0659 02/09/23  0308 02/08/23  0344 02/07/23 2001   SODIUM 133* 133* 133* 129*   POTASSIUM 4.2 4.0 3.9 4.0   CHLORIDE 102 102 100 93*   CO2 20.7* 22.5 23.4 24.5   ANION GAP 10.3 8.5 9.6 11.5   BUN 11 8 10 12   CREATININE 1.09* 1.16* 1.08* 1.19*   EGFR 53.4* 49.6* 54.0* 48.1*   GLUCOSE 121* 98 137* 151*   CALCIUM 9.3 8.9 8.6 9.1   MAGNESIUM  --  1.9 2.5* 1.5*   PHOSPHORUS  --  3.9 3.2  --    HEMOGLOBIN A1C  --   --  7.50*  --          Lab 02/09/23  0308 02/07/23 2001   TOTAL PROTEIN 5.7* 7.3   ALBUMIN 3.3* 4.3   GLOBULIN 2.4 3.0   ALT (SGPT) 21 30   AST (SGOT) 16 27   BILIRUBIN 0.3 0.4   ALK PHOS 89 126*         Lab 02/08/23  0344 02/07/23  2136 02/07/23 2001   PROBNP 154.5  --   --    HSTROP T  --  10* 8         Lab 02/08/23  0344   CHOLESTEROL 130   LDL CHOL 72   HDL CHOL 35*   TRIGLYCERIDES 127             Brief Urine Lab  Results     None        Microbiology Results (last 10 days)     ** No results found for the last 240 hours. **        Labs Pending at Discharge:   Time spent on Discharge including face to face service: > 30 minutes  Electronically signed by MICHELLE Franklin, 02/10/23, 10:24 AM EST.

## 2023-02-10 NOTE — PROGRESS NOTES
[facility]   Hospitalist Progress Note  Date: 2/10/2023  Patient Name: Rebecca Mchugh  : 1950  MRN: [id number]  Date of admission: 2023      Subjective   Subjective     Chief Complaint: follow up Subdural hemorrhage    Summary: 72-year-old female former smoker with history of breast cancer, DM, HLD presents following mechanical fall with head trauma.  Found to have acute subdural hemorrhage    Interval Followup:     · 2/10/2023  · Feels well  · Back to baseline  · Blood pressure better  · Discussed disposition.  Patient eager to return home.  · No neurological deficits.    Objective   Objective     Vitals:   Temp:  [97.7 °F (36.5 °C)-98.7 °F (37.1 °C)] 98.6 °F (37 °C)  Heart Rate:  [64-80] 68  Resp:  [16-19] 17  BP: (109-145)/(43-90) 109/43  Flow (L/min):  [2] 2  Physical Exam    Constitutional: Awake, alert, no acute distress   Eyes: Pupils equal, sclerae anicteric, no conjunctival injection   HENT: NCAT, mucous membranes moist   Neck: Supple, no thyromegaly, no lymphadenopathy, trachea midline   Respiratory: Clear to auscultation bilaterally, nonlabored respirations    Cardiovascular: RRR, no murmurs, rubs, or gallops, palpable pedal pulses bilaterally   Gastrointestinal: Positive bowel sounds, soft, nontender, nondistended   Musculoskeletal: No bilateral ankle edema, no clubbing or cyanosis to extremities   Psychiatric: Appropriate affect, cooperative   Neurologic: Oriented x 3, speech clear   Skin: No rashes         Assessment & Plan   Assessment / Plan     Assessment:    -Subdural hemorrhage  -Fall with head trauma  -Hold aspirin  -Control BP.    -Repeat CT head reviewed and negative for expansion of bleeding      Plan:    Home today  Follow-up CT head per neurosurgeon, in 2 weeks  Started on antihypertensives here.  Check BP at home several times daily and discuss results with PCP at follow-up appointment  Follow-up PCP 1 week  Follow-up neurosurgeon 3-4 weeks    CODE STATUS:   Medical  Intervention Limits: NO intubation (DNI)  Level Of Support Discussed With: Patient  Code Status (Patient has no pulse and is not breathing): No CPR (Do Not Attempt to Resuscitate)  Medical Interventions (Patient has pulse or is breathing): Limited Support  Release to patient: Routine Release

## 2023-02-10 NOTE — PAYOR COMM NOTE
"Kieran Giraldo (72 y.o. Female)     Date of Birth   1950    Social Security Number       Address   [address] [place] [address]    Home Phone   [phone]    MRN   [id number]       Hinduism   Sabianism    Marital Status                               Admission Date   2/7/23    Admission Type   Emergency    Admitting Provider   Rod Muñoz DO    Attending Provider       Department, Room/Bed   [facility], 218/1       Discharge Date   2/10/2023    Discharge Disposition   Home or Self Care    Discharge Destination                               Attending Provider: (none)   Allergies: Strawberry    Isolation: None   Infection: None   Code Status: No CPR    Ht: 162.6 cm (64\")   Wt: 64.8 kg (142 lb 13.7 oz)    Admission Cmt: None   Principal Problem: Subdural hemorrhage (HCC) [I62.00]                 Active Insurance as of 2/7/2023     Primary Coverage     Payor Plan Insurance Group Employer/Plan Group    ANTHEM MEDICARE REPLACEMENT ANTHEM MEDICARE ADVANTAGE [id number]     Payor Plan Address Payor Plan Phone Number Payor Plan Fax Number Effective Dates    [address] [phone]  1/1/2020 - None Entered    [facility] [address]       Subscriber Name Subscriber Birth Date Member ID       KIERAN GIRALDO 1950 [id number]           Secondary Coverage     Payor Plan Insurance Group Employer/Plan Group    [place]      Payor Plan Address Payor Plan Phone Number Payor Plan Fax Number Effective Dates    [address] [phone]  9/20/2021 - None Entered    [facility] [id number]       Subscriber Name Subscriber Birth Date Member ID       KIERAN GIRALDO 1950 [id number]                 Emergency Contacts      (Rel.) Home Phone Work Phone Mobile Phone    Angelina Penaloza (Daughter) [phone] -- --        #[id number] PENDED CASE-HAS DC ORDERS FOR TODAY    Yelena ROLLINS  [facility] [place] ,UR   [phone]  F " [phone].         History & Physical      Betsy, Herber Brantley MD at [phone]            [facility]   HISTORY AND PHYSICAL    Patient Name: Rebecca Mchugh  : 1950  MRN: [id number]  Primary Care Physician: Provider, No Known  Date of admission: 2023    Subjective   Subjective     Chief Complaint: Fall    History of Present Illness  72-year-old female former smoker with history of breast cancer, DM, HLD presents following mechanical fall.  She was walking her dog when she tripped on the pavement, falling and hitting her back and head.  She denies dizziness, confusion, loss of consciousness.  She denies headache but does have neck pain.  She has no current dizziness, confusion, change in vision.  She denies any recent illness; denies fevers, chills, chest pain, shortness of breath, abdominal pain, pain with urination, change in bowel habits.    Note: The patient has a stimulator due to urinary retention.    In the ED:  VSS    Labs largely unremarkable:  -Corrected sodium 130  BUN/creatinine 12/1.19 (possibly up from baseline of 0.80)  Glucose 151    Magnesium 1.5    WBC 5.3    CXR:  1. Increased density in the right infrahilar/basilar region.  This is suspected to at least in part represent bronchovascular crowding although superimposed pneumonia or atelectasis is not excluded.    CT cervical spine:  -No acute process    CT head:  1. No fracture demonstrated  2. Mild small vessel ischemic changes in the white matter  3. Parafalcine acute subdural hemorrhage as detailed above    ED consulted Dr. Booker:  -Observation  -Cardene drip with goal SBP < 140  -Hold aspirin    Review of Systems   Constitutional: Denies fevers or chills  Cardiovascular: Negative for chest pain and palpitations.   Gastrointestinal: Positive for nausea.   Musculoskeletal: Positive for neck pain. Negative for arthralgias, back pain and joint swelling.   Skin: Negative for wound.   Neurological: Negative for syncope,  weakness, numbness and headaches.   All other systems reviewed and are negative.       Personal History     Past Medical History:   Diagnosis Date   • Acid reflux    • Ankle fracture 2020   • Bladder disorder    • Breast cancer (HCC)     Right   • Cancer (HCC)    • Colitis    • Diabetes (HCC)    • Diabetes mellitus (HCC)    • GERD (gastroesophageal reflux disease)    • History of spinal fracture 11/2020   • Hyperlipemia    • Recurrent UTI (urinary tract infection)    • Renal calculus or stone    • Seasonal allergies    • Trigger thumb of right hand 6/17/2021   • Urinary incontinence in female        Past Surgical History:   Procedure Laterality Date   • ABDOMINAL HYSTERECTOMY     • BACK SURGERY      HERNIATED DISCS  X3 OR 4 TIMES    • BLADDER REPAIR  2002   • BREAST BIOPSY     • BREAST LUMPECTOMY Right     breast mass   • CHOLECYSTECTOMY     • COLONOSCOPY     • CYSTOSCOPY      with dilation   • GALLBLADDER SURGERY     • INTERSTIM PLACEMENT  2018-19?   • INTRAOCULAR LENS INSERTION     • TRIGGER FINGER RELEASE Right 6/17/2021    Procedure: RIGHT FINGER TRIGGER THUMB RELEASE;  Surgeon: Eyad Griffith MD;  [place] [facility] [place] [facility] [place]  Service: Orthopedics;  Laterality: Right;   • VAGINAL MESH REVISION      vaginal approach per Dr. Liao       Family History: family history includes Breast cancer in her mother; Cancer in her brother and mother; Diabetes in her brother; Heart disease in her brother. Otherwise pertinent FHx was reviewed and not pertinent to current issue.    Social History:  reports that she has quit smoking. Her smoking use included cigarettes. She smoked an average of 1 pack per day. She does not have any smokeless tobacco history on file. She reports that she does not drink alcohol and does not use drugs.    Home Medications:  HYDROcodone-acetaminophen, amitriptyline, atorvastatin, calcium carbonate, fesoterodine fumarate, levocetirizine, lidocaine, metFORMIN, multivitamin with minerals, omeprazole, and  predniSONE      Allergies:  Allergies   Allergen Reactions   • Strawberry Hives       Objective    Objective     Vitals:  Temp:  [98.5 °F (36.9 °C)] 98.5 °F (36.9 °C)  Heart Rate:  [88-92] 90  Resp:  [16-18] 16  BP: (166-183)/(80-98) 183/84    Physical Exam  Constitutional:       Appearance: Normal appearance. She is not ill-appearing or toxic-appearing.   HENT:      Head: Normocephalic.      Nose: Nose normal.      Mouth/Throat:      Mouth: Mucous membranes are moist.   Eyes:      Extraocular Movements: Extraocular movements intact.      Conjunctiva/sclera: Conjunctivae normal.      Pupils: Pupils are equal, round, and reactive to light.   Cardiovascular:      Rate and Rhythm: Normal rate and regular rhythm.   Pulmonary:      Effort: Pulmonary effort is normal.      Breath sounds: Normal breath sounds.   Musculoskeletal:         General: Tenderness (cervical spine) present. Normal range of motion.      Cervical back: Normal range of motion.   Skin:     General: Skin is warm and dry.      Findings: No bruising.   Neurological:      General: No focal deficit present.      Mental Status: She is alert.      Cranial Nerves: No cranial nerve deficit.      Sensory: No sensory deficit.      Motor: No weakness.      Coordination: Coordination normal.   Result Review    Result Review:  I have personally reviewed the results from the time of this admission to 2/7/2023 21:55 EST and agree with these findings:  [x]  Laboratory list / accordion  []  Microbiology  [x]  Radiology  [x]  EKG/Telemetry   []  Cardiology/Vascular   []  Pathology  []  Old records  []  Other:  Most notable findings include: Subdural bleed; Equivocal CXR        Assessment & Plan   Assessment / Plan     Brief Patient Summary:  72-year-old female former smoker with history of breast cancer, DM, HLD presents following mechanical fall with head trauma.  Found to have acute subdural hemorrhage.    Active Hospital Problems:  Active Hospital Problems     "Diagnosis    • **Subdural hemorrhage (HCC)        Plan:   Subdural hemorrhage  -Fall with head trauma  -Dr. Booker consulted   -Observation    -Hold aspirin   -Control BP     Equivocal findings on CXR  -Patient denies absolutely any respiratory issues    Chronic issues:  [] Reinstate home meds when appropriate   -Note: Holding aspirin for now      DVT prophylaxis:  SCDs    CODE STATUS: DNR/DNI    Admission Status:  I believe this patient meets OBS status.    Herber Meyer MD,    Electronically signed by Herber Meyer MD at 02/08/23 0629          Emergency Department Notes      Kelly Latham, RN at 02/07/23 1933        Patient brought via [facility] for a fall.  Patient was walking her dog and fell.  Patient did hit her head, no LOC per EMS.  Patient complaining of neck pain. Patient denies vomiting but does state she is nauseated. Denies changes in vision.     Electronically signed by Kelly Latham RN at 02/07/23 1935     Kelly Latham RN at 02/07/23 1942        Patient states she \"Just lost my footing\" while walking dog. Denies any other reason for the fall.     Electronically signed by Kelly Latham RN at 02/07/23 1942     Kelly Latham RN at 02/07/23 2002        Patient alert and oriented x4, answering questions appropriately.      Electronically signed by Kelly Latham RN at 02/07/23 2002     Mary Ferrari PA-C at 02/07/23 2104     Attestation signed by Clarence Tim MD at 02/08/23 0250        SUPERVISE: For this patient encounter, I reviewed the APC's documentation, treatment plan, and medical decision making.  Clarence Tim MD 2/8/2023 02:50 EST                         Time: 9:04 PM EST  Date of encounter:  2/7/2023  Independent Historian/Clinical History and Information was obtained by:   Patient  Chief Complaint: fall, head injury    History is limited by: N/A    History of Present Illness:  Patient is a 72 y.o. year old female who presents to the emergency " department for evaluation of mechanical fall, head injury.  The patient was walking her dog and it started to get dark so tripped on uneven pavement.  Fell onto her back and hit her head.  Denies LOC, headache.  Has mild nausea.  Complaining of neck pain.  Denies any extremity pain, back pain.  She takes 81 mg ASA daily but no other anticoagulation.  Denies vision changes, numbness, weakness. Denies any prodromal symptoms including chest pain, palpitations, SOA, lightheadedness, dizziness. No recent URI symptoms.     HPI    Patient Care Team  Primary Care Provider: Provider, No Known    Past Medical History:     Allergies   Allergen Reactions   • Ruby Hives     Past Medical History:   Diagnosis Date   • Acid reflux    • Ankle fracture 2020   • Bladder disorder    • Breast cancer (HCC)     Right   • Cancer (HCC)    • Colitis    • Diabetes (HCC)    • Diabetes mellitus (HCC)    • GERD (gastroesophageal reflux disease)    • History of spinal fracture 11/2020   • Hyperlipemia    • Recurrent UTI (urinary tract infection)    • Renal calculus or stone    • Seasonal allergies    • Trigger thumb of right hand 6/17/2021   • Urinary incontinence in female      Past Surgical History:   Procedure Laterality Date   • ABDOMINAL HYSTERECTOMY     • BACK SURGERY      HERNIATED DISCS  X3 OR 4 TIMES    • BLADDER REPAIR  2002   • BREAST BIOPSY     • BREAST LUMPECTOMY Right     breast mass   • CHOLECYSTECTOMY     • COLONOSCOPY     • CYSTOSCOPY      with dilation   • GALLBLADDER SURGERY     • INTERSTIM PLACEMENT  2018-19?   • INTRAOCULAR LENS INSERTION     • TRIGGER FINGER RELEASE Right 6/17/2021    Procedure: RIGHT FINGER TRIGGER THUMB RELEASE;  Surgeon: Eyad Griffith MD;  [place]: [facility] [place] [facility] [place];  Service: Orthopedics;  Laterality: Right;   • VAGINAL MESH REVISION      vaginal approach per Dr. Liao     Family History   Problem Relation Age of Onset   • Cancer Mother         unspecified   • Breast cancer Mother         30s   •  Heart disease Brother    • Cancer Brother         unspecified   • Diabetes Brother         unspecified type   • Malig Hyperthermia Neg Hx        Home Medications:  Prior to Admission medications    Medication Sig Start Date End Date Taking? Authorizing Provider   amitriptyline (ELAVIL) 25 MG tablet Take 25 mg by mouth Every Night.    Ryne Reilly MD   atorvastatin (LIPITOR) 40 MG tablet Take 40 mg by mouth Daily.    Ryne Reilly MD   calcium carbonate (OS-CHAPARRO) 600 MG tablet Take 500 mg by mouth 2 (Two) Times a Day With Meals.    Ryne Reilly MD   fesoterodine fumarate (TOVIAZ ER) 8 MG tablet sustained-release 24 hour tablet Take  by mouth Daily.    Ryne Reilly MD   HYDROcodone-acetaminophen (NORCO) 5-325 MG per tablet Take 1 tablet by mouth 4 (Four) Times a Day As Needed for Moderate Pain . 5/12/22   Ren Mckay MD   levocetirizine (XYZAL) 5 MG tablet Take 5 mg by mouth Every Evening.    Ryne Reilly MD   lidocaine (Lidoderm) 5 % Place 1 patch on the skin as directed by provider Daily. Remove & Discard patch within 12 hours or as directed by MD 5/12/22   Golden Clark APRN   metFORMIN (GLUCOPHAGE) 500 MG tablet Take 500 mg by mouth 2 (Two) Times a Day With Meals.    Ryne Reilly MD   multivitamin with minerals tablet tablet Take 1 tablet by mouth Daily.    Ryne Reilly MD   omeprazole (priLOSEC) 40 MG capsule Take 40 mg by mouth Daily.    Ryne Reilly MD   predniSONE (DELTASONE) 20 MG tablet Take 2 tablets by mouth Daily. 5/12/22   Golden Clark APRN        Social History:   Social History     Tobacco Use   • Smoking status: Former     Packs/day: 1.00     Types: Cigarettes   • Tobacco comments:     quit smoking at age 48   Vaping Use   • Vaping Use: Never used   Substance Use Topics   • Alcohol use: Never   • Drug use: Never         Review of Systems:  Review of Systems   Cardiovascular: Negative for chest pain and palpitations.  "  Gastrointestinal: Positive for nausea.   Musculoskeletal: Positive for neck pain. Negative for arthralgias, back pain and joint swelling.   Skin: Negative for wound.   Neurological: Negative for syncope, weakness, numbness and headaches.   All other systems reviewed and are negative.       Physical Exam:  /55   Pulse 86   Temp 97.9 °F (36.6 °C) (Oral)   Resp 16   Ht 162.6 cm (64\")   Wt 64.8 kg (142 lb 13.7 oz)   SpO2 91%   BMI 24.52 kg/m²     Physical Exam  Vitals and nursing note reviewed.   Constitutional:       Appearance: Normal appearance. She is not ill-appearing or toxic-appearing.   HENT:      Head: Normocephalic.      Nose: Nose normal.      Mouth/Throat:      Mouth: Mucous membranes are moist.   Eyes:      Extraocular Movements: Extraocular movements intact.      Conjunctiva/sclera: Conjunctivae normal.      Pupils: Pupils are equal, round, and reactive to light.   Cardiovascular:      Rate and Rhythm: Normal rate and regular rhythm.   Pulmonary:      Effort: Pulmonary effort is normal.      Breath sounds: Normal breath sounds.   Musculoskeletal:         General: Tenderness (cervical spine) present. Normal range of motion.      Cervical back: Normal range of motion.   Skin:     General: Skin is warm and dry.      Capillary Refill: Capillary refill takes less than 2 seconds.      Findings: No bruising.   Neurological:      General: No focal deficit present.      Mental Status: She is alert.      Cranial Nerves: No cranial nerve deficit.      Sensory: No sensory deficit.      Motor: No weakness.      Coordination: Coordination normal.                 Procedures:  Procedures      Medical Decision Making:      Comorbidities that affect care:    Cancer, Diabetes    External Notes reviewed:    Previous ED Note      The following orders were placed and all results were independently analyzed by me:  Orders Placed This Encounter   Procedures   • XR Chest 1 View   • CT Head Without Contrast   • CT " Cervical Spine Without Contrast   • [place] Draw   • Comprehensive Metabolic Panel   • Troponin   • Magnesium   • CBC Auto Differential   • High Sensitivity Troponin T 2Hr   • Hemoglobin A1c   • Lipid Panel   • CBC (No Diff)   • Basic Metabolic Panel   • Magnesium   • Diet: Diabetic Diets; Consistent Carbohydrate; Texture: Regular Texture (IDDSI 7); Fluid Consistency: Thin (IDDSI 0)   • Undress & Gown   • Vital Signs   • Orthostatic Blood Pressure   • Vital Signs   • Pulse Oximetry, Continuous   • Cardiac Monitoring   • Notify Provider   • Head of Bed   • Turn Patient   • Nursing Dysphagia Screening (Complete Prior to Giving Anything By Mouth)   • RN to Place Order SLP Consult - Eval & Treat Choosing Reason of RN Dysphagia Screen Failed   • Nurse to Call MD or Nutrition Services for Diet if Patient Passes Dysphagia Screen   • Intake and Output   • Neuro Checks   • NIHSS Assessment   • Order CT Head Without Contrast for Neurological Decline   • Provide Stroke Education Material   • Saline Lock & Maintain IV Access   • Place Sequential Compression Device   • Maintain Sequential Compression Device   • Commode At Bedside   • Activity As Tolerated   • Code Status and Medical Interventions:   • IP Consult to Neurosurgery   • Inpatient Hospitalist Consult   • Inpatient Case Management  Consult   • Inpatient Diabetes Educator Consult   • Inpatient Neurosurgery Consult   • OT Consult: Eval & Treat   • PT Consult: Eval & Treat As Tolerated   • Oxygen Therapy- Nasal Cannula; 2 LPM; Titrate for SPO2: 92%, Greater Than or Equal To   • POC Glucose Once   • POC Glucose Once   • POC Glucose Q6H   • ECG 12 Lead ED Triage Standing Order; Weak / Dizzy / AMS   • Insert Peripheral IV   • Insert Peripheral IV   • Initiate Observation Status   • Fall Precautions   • CBC & Differential   • Green Top (Gel)   • Lavender Top   • Gold Top - SST   • Light Blue Top       Medications Given in the Emergency Department:  Medications    sodium chloride 0.9 % flush 10 mL (has no administration in time range)   niCARdipine (CARDENE) 25 mg in 250 mL NS infusion (0 mg/hr Intravenous Stopped 2/8/23 0214)   sodium chloride 0.9 % flush 10 mL (has no administration in time range)   sodium chloride 0.9 % flush 10 mL (10 mL Intravenous Given 2/8/23 0025)   sodium chloride 0.9 % infusion 40 mL (has no administration in time range)   sodium chloride 0.9 % infusion (100 mL/hr Intravenous Currently Infusing 2/8/23 0122)   acetaminophen (TYLENOL) tablet 650 mg (650 mg Oral Given 2/8/23 0128)   magnesium sulfate 2g/50 mL (PREMIX) infusion (2 g Intravenous New Bag 2/8/23 0025)        ED Course:    ED Course as of 02/08/23 0218 Tue Feb 07, 2023   2100 CT Head Without Contrast [KM]      ED Course User Index  [KM] Mary Ferrari PA-C       Labs:    Lab Results (last 24 hours)     Procedure Component Value Units Date/Time    POC Glucose Once [[id number]]  (Abnormal) Collected: 02/07/23 1949    Specimen: Blood Updated: 02/07/23 1950     Glucose 153 mg/dL      Comment: Serial Number: [id number]:  [id number]       CBC & Differential [[id number]]  (Abnormal) Collected: 02/07/23 2001    Specimen: Blood Updated: 02/07/23 2013    Narrative:      The following orders were created for panel order CBC & Differential.  Procedure                               Abnormality         Status                     ---------                               -----------         ------                     CBC Auto Differential[[id number]]        Abnormal            Final result                 Please view results for these tests on the individual orders.    Comprehensive Metabolic Panel [[id number]]  (Abnormal) Collected: 02/07/23 2001    Specimen: Blood Updated: 02/07/23 2039     Glucose 151 mg/dL      BUN 12 mg/dL      Creatinine 1.19 mg/dL      Sodium 129 mmol/L      Potassium 4.0 mmol/L      Comment: Slight hemolysis detected by analyzer. Results may be affected.        Chloride 93  mmol/L      CO2 24.5 mmol/L      Calcium 9.1 mg/dL      Total Protein 7.3 g/dL      Albumin 4.3 g/dL      ALT (SGPT) 30 U/L      AST (SGOT) 27 U/L      Comment: Slight hemolysis detected by analyzer. Results may be affected.        Alkaline Phosphatase 126 U/L      Total Bilirubin 0.4 mg/dL      Globulin 3.0 gm/dL      A/G Ratio 1.4 g/dL      BUN/Creatinine Ratio 10.1     Anion Gap 11.5 mmol/L      eGFR 48.1 mL/min/1.73     Narrative:      GFR Normal >60  Chronic Kidney Disease <60  Kidney Failure <15    The GFR formula is only valid for adults with stable renal function between ages 18 and 70.    Troponin [[id number]]  (Normal) Collected: 02/07/23 2001    Specimen: Blood Updated: 02/07/23 2036     HS Troponin T 8 ng/L     Narrative:      High Sensitive Troponin T Reference Range:  <10.0 ng/L- Negative Female for AMI  <15.0 ng/L- Negative Male for AMI  >=10 - Abnormal Female indicating possible myocardial injury.  >=15 - Abnormal Male indicating possible myocardial injury.   Clinicians would have to utilize clinical acumen, EKG, Troponin, and serial changes to determine if it is an Acute Myocardial Infarction or myocardial injury due to an underlying chronic condition.         Magnesium [[id number]]  (Abnormal) Collected: 02/07/23 2001    Specimen: Blood Updated: 02/07/23 2036     Magnesium 1.5 mg/dL     CBC Auto Differential [[id number]]  (Abnormal) Collected: 02/07/23 2001    Specimen: Blood Updated: 02/07/23 2013     WBC 5.30 10*3/mm3      RBC 3.87 10*6/mm3      Hemoglobin 11.1 g/dL      Hematocrit 33.4 %      MCV 86.3 fL      MCH 28.7 pg      MCHC 33.2 g/dL      RDW 14.2 %      RDW-SD 44.8 fl      MPV 9.1 fL      Platelets 264 10*3/mm3      Neutrophil % 69.3 %      Lymphocyte % 17.0 %      Monocyte % 9.1 %      Eosinophil % 3.4 %      Basophil % 0.8 %      Immature Grans % 0.4 %      Neutrophils, Absolute 3.68 10*3/mm3      Lymphocytes, Absolute 0.90 10*3/mm3      Monocytes, Absolute 0.48 10*3/mm3      Eosinophils,  Absolute 0.18 10*3/mm3      Basophils, Absolute 0.04 10*3/mm3      Immature Grans, Absolute 0.02 10*3/mm3      nRBC 0.0 /100 WBC     High Sensitivity Troponin T 2Hr [[id number]]  (Abnormal) Collected: 02/07/23 2136    Specimen: Blood Updated: 02/07/23 2239     HS Troponin T 10 ng/L      Troponin T Delta 2 ng/L     Narrative:      High Sensitive Troponin T Reference Range:  <10.0 ng/L- Negative Female for AMI  <15.0 ng/L- Negative Male for AMI  >=10 - Abnormal Female indicating possible myocardial injury.  >=15 - Abnormal Male indicating possible myocardial injury.   Clinicians would have to utilize clinical acumen, EKG, Troponin, and serial changes to determine if it is an Acute Myocardial Infarction or myocardial injury due to an underlying chronic condition.                Imaging:    CT Head Without Contrast    Result Date: 2/7/2023  PROCEDURE: CT HEAD WO CONTRAST  COMPARISON:  None INDICATIONS: headache  PROTOCOL:   Standard imaging protocol performed    RADIATION:   DLP: 1080mGy*cm   MA and/or KV was adjusted to minimize radiation dose.     TECHNIQUE: After obtaining the patient's consent, CT images were obtained without non-ionic intravenous contrast material.  FINDINGS:  There is acute subdural hemorrhage along the anterior falx measuring up to 3.6 cm AP and 0.6 cm in thickness.  No other intracranial hemorrhage is identified.  The ventricles are normal in size and configuration.  Mild low density in the white matter is consistent with small vessel ischemic disease.  No fracture is identified.        1. No fracture demonstrated 2. Mild small vessel ischemic changes in the white matter 3. Parafalcine acute subdural hemorrhage as detailed above    I called a report to Dr. Santiago.     Clayton Lerma M.D.       Electronically Signed and Approved By: Clayton Lerma M.D. on 2/07/2023 at 20:51             CT Cervical Spine Without Contrast    Result Date: 2/7/2023  PROCEDURE: CT CERVICAL SPINE WO CONTRAST   COMPARISON: None  INDICATIONS: fall  PROTOCOL:   Standard imaging protocol performed    RADIATION:   DLP: 323.7mGy*cm   MA and/or KV was adjusted to minimize radiation dose.     TECHNIQUE: After obtaining the patient's consent, multi-planar CT images were created without contrast material.   FINDINGS:  There is been a previous right C4 laminectomy.  No acute fracture or malalignment is identified.  There is moderate degenerative disc disease in the mid and lower cervical spine.  Ground-glass opacities in the upper lung fields may be secondary to chronic interstitial lung disease.  Correlate clinically.        1. No acute cervical spine fracture or malalignment     Clayton Lerma M.D.       Electronically Signed and Approved By: Clayton Lerma M.D. on 2/07/2023 at 20:54             XR Chest 1 View    Result Date: 2/7/2023  PROCEDURE: XR CHEST 1 VW  COMPARISON: [facility], , CHEST AP/PA 1 VIEW, 9/26/2019, 23:21.  INDICATIONS: Weak/Dizzy/AMS  FINDINGS:  Prominent interstitial markings are noted, likely related to chronic interstitial lung disease.  There is increased density projecting over the right lung base/infrahilar region.  No effusion is seen.  The cardiac and mediastinal silhouettes appear normal.  Multiple surgical clips project over the right axilla.        1. Increased density in the right infrahilar/basilar region.  This is suspected to at least in part represent bronchovascular crowding although superimposed pneumonia or atelectasis is not excluded.       Clayton Lerma M.D.       Electronically Signed and Approved By: Clayton Lerma M.D. on 2/07/2023 at 20:45                 Differential Diagnosis and Discussion:    Headache: Differential diagnosis includes but is not limited to migraine, cluster headache, hypertension, tumor, subarachnoid bleeding, pseudotumor cerebri, temporal arteritis, infections, tension headache, and TMJ syndrome.  Neck Pain: The patient presents with neck pain. My  differential diagnosis includes but is not limited to acute spinal epidural abscess, acute spinal epidural bleed, meningitis, musculoskeletal neck pain, spinal fracture, and osteoarthritis.     All labs were reviewed and analyzed by me.  EKG was interpreted by supervising attending.  CT scan radiology interpretation was reviewed by me.    [facility]     Critical Care Note: Total Critical Care time of 30 minutes. Total critical care time documented does not include time spent on separately billed procedures for services of nurses or physician assistants. I personally saw and examined the patient. I have reviewed all diagnostic interpretations and treatment plans as written. I was present for the key portions of any procedures performed and the inclusive time noted in any critical care statement. Critical care time includes patient management by me, time spent at the patients bedside,  time to review lab and imaging results, discussing patient care, documentation in the medical record, and time spent with family or caregiver.    Patient Care Considerations:    CT CHEST: I considered ordering a CT scan of the chest, however denies CP, SOA, cough, palpitations      Consultants/Shared Management Plan:    Hospitalist: I have discussed the case with Dr. Meyer who agrees to accept the patient for admission.  Consultant: I have discussed the case with Dr. Booker with Neurosurgery who states pt should be admitted for observation, hold ASA, repeat imaging in AM    Social Determinants of Health:    Patient is independent, reliable, and has access to care.       Disposition and Care Coordination:    Admit:   Through independent evaluation of the patient's history, physical, and imperical data, the patient meets criteria for observation/admission to the hospital.        Final diagnoses:   Subdural hemorrhage (HCC)        ED Disposition     ED Disposition   Decision to Admit    Condition   --    Comment   Level of Care: Critical Care  [6]   Diagnosis: Subdural hematoma [103171]   Admitting Physician: BLUE JOHNSON [[id number]]   Attending Physician: BLUE JOHNSON [[id number]]               This medical record created using voice recognition software.           Mary Ferrari PA-C  23 021      Electronically signed by Clarence Tim MD at 23 025     Kelly Latham, RN at 23        Called Charge for ultrasound IV.  That RN was able to start a 20 g in Left AC. Started Cardene in that IV but pump continued to say downstream occlusion.  Charge notified for another IV placement.     Electronically signed by Kelly Latham RN at 23          Physician Progress Notes (last 48 hours)      Fabrizio Abraham PA at 02/10/23 1012           [facility]  Date: 2/10/2023  Patient Name: Rebecca Mchugh  : 1950  MRN: [id number]  Date of admission: 2023      Subjective   Subjective     Chief Complaint: follow up Subdural hemorrhage    Summary: 72-year-old female former smoker with history of breast cancer, DM, HLD presents following mechanical fall with head trauma.  Found to have acute subdural hemorrhage    Interval Followup:     · 2/10/2023  · Feels well  · Back to baseline  · Blood pressure better  · Discussed disposition.  Patient eager to return home.  · No neurological deficits.    Objective   Objective     Vitals:   Temp:  [97.7 °F (36.5 °C)-98.7 °F (37.1 °C)] 98.6 °F (37 °C)  Heart Rate:  [64-80] 68  Resp:  [16-19] 17  BP: (109-145)/(43-90) 109/43  Flow (L/min):  [2] 2  Physical Exam    Constitutional: Awake, alert, no acute distress   Eyes: Pupils equal, sclerae anicteric, no conjunctival injection   HENT: NCAT, mucous membranes moist   Neck: Supple, no thyromegaly, no lymphadenopathy, trachea midline   Respiratory: Clear to auscultation bilaterally, nonlabored respirations    Cardiovascular: RRR, no murmurs, rubs, or gallops, palpable pedal pulses  bilaterally   Gastrointestinal: Positive bowel sounds, soft, nontender, nondistended   Musculoskeletal: No bilateral ankle edema, no clubbing or cyanosis to extremities   Psychiatric: Appropriate affect, cooperative   Neurologic: Oriented x 3, speech clear   Skin: No rashes         Assessment & Plan   Assessment / Plan     Assessment:    -Subdural hemorrhage  -Fall with head trauma  -Hold aspirin  -Control BP.    -Repeat CT head reviewed and negative for expansion of bleeding      Plan:    Home today  Follow-up CT head per neurosurgeon, in 2 weeks  Started on antihypertensives here.  Check BP at home several times daily and discuss results with PCP at follow-up appointment  Follow-up PCP 1 week  Follow-up neurosurgeon 3-4 weeks    CODE STATUS:   Medical Intervention Limits: NO intubation (DNI)  Level Of Support Discussed With: Patient  Code Status (Patient has no pulse and is not breathing): No CPR (Do Not Attempt to Resuscitate)  Medical Interventions (Patient has pulse or is breathing): Limited Support  Release to patient: Routine Release               Electronically signed by Fabrizio Abraham PA at 02/10/23 1024     Rod Muñoz DO at 23 1309           [facility]  Date: 2023  Patient Name: Rebecca Mchugh  : 1950  MRN: [id number]  Date of admission: 2023      Subjective   Subjective     Chief Complaint: follow up Subdural hemorrhage    Summary: 72-year-old female former smoker with history of breast cancer, DM, HLD presents following mechanical fall with head trauma.  Found to have acute subdural hemorrhage    Interval Followup: repeat CT without expansion of bleeding.  No headache.  Blood pressure has been running >140 systolic today since turning off the Cardene drip        Objective   Objective     Vitals:   Temp:  [98.2 °F (36.8 °C)-99.4 °F (37.4 °C)] 98.7 °F (37.1 °C)  Heart Rate:  [69-90] 77  Resp:  [16] 16  BP: ()/(51-88) 145/77  Flow (L/min):   [2] 2  Physical Exam    Constitutional: Awake, alert, no acute distress   Eyes: Pupils equal, sclerae anicteric, no conjunctival injection   HENT: NCAT, mucous membranes moist   Neck: Supple, no thyromegaly, no lymphadenopathy, trachea midline   Respiratory: Clear to auscultation bilaterally, nonlabored respirations    Cardiovascular: RRR, no murmurs, rubs, or gallops, palpable pedal pulses bilaterally   Gastrointestinal: Positive bowel sounds, soft, nontender, nondistended   Musculoskeletal: No bilateral ankle edema, no clubbing or cyanosis to extremities   Psychiatric: Appropriate affect, cooperative   Neurologic: Oriented x 3, speech clear   Skin: No rashes         Assessment & Plan   Assessment / Plan     Assessment/Plan:  -Subdural hemorrhage  -Fall with head trauma                -Hold aspirin              -Control BP.  Cardene drip dc'd but still hypertensive above goal.  Start Lisinopril in addition to current orals                -Repeat CT head reviewed and negative for expansion of bleeding     -transfer out of ICU to monitored bed.     - I can't discharge her today because her blood pressure is above goal     Discussed plan with RN.    DVT prophylaxis:  Mechanical DVT prophylaxis orders are present.    CODE STATUS:   Medical Intervention Limits: NO intubation (DNI)  Level Of Support Discussed With: Patient  Code Status (Patient has no pulse and is not breathing): No CPR (Do Not Attempt to Resuscitate)  Medical Interventions (Patient has pulse or is breathing): Limited Support  Release to patient: Routine Release               Electronically signed by Rod Muñoz DO at 23 1324     Layla Garcia MD at 23 1211          Pulmonary / Critical Care Progress Note      Patient Name: Rebecca Mchugh  : 1950  MRN: [id number]  Attending:  Rod Muñoz DO   Date of admission: 2023    Subjective   Subjective   Patient critically ill with acute CVA    Over past 24 hours:  Cardene drip has  been off since 7 PM yesterday  Remains hemodynamically stable  No acute events overnight  No changes in neuro status    Review of Systems  Constitutional symptoms:  Denied complaints   Ear, nose, throat: Denied complaints  Cardiovascular:  Denied complaints  Respiratory: Denied complaints  Gastrointestinal: Denied complaints  Musculoskeletal: Denied complaints  Neurologic: Denied complaints  Skin: Denied complaints        Objective   Objective     Vitals:   Vital signs for last 24 hours:  Temp:  [98.2 °F (36.8 °C)-99.4 °F (37.4 °C)] 99 °F (37.2 °C)  Heart Rate:  [69-90] 75  Resp:  [16] 16  BP: ()/(51-94) 148/70    Intake/Output last 3 shifts:  I/O last 3 completed shifts:  In: 545 [I.V.:545]  Out: 3625 [Urine:3625]  Intake/Output this shift:  I/O this shift:  In: -   Out: 250 [Urine:250]    Vent settings for last 24 hours:       Hemodynamic parameters for last 24 hours:       Physical Exam   Vital Signs Reviewed   General:  Alert, NAD.    HEENT:  PERRL, EOMI.  OP  Chest:  Clear to auscultation bilaterally, no work of breathing noted on room air  CV: RRR, no MGR, pulses 2+, equal.  Abd:  Soft, NT, ND, + BS  EXT:  no clubbing, no cyanosis, no edema  Neuro:  A&Ox3, CN grossly intact, no focal deficits.  Skin: No rashes or lesions noted      Result Review    Result Review:  I have personally reviewed the results from the time of this admission to 2/9/2023 12:11 EST and agree with these findings:  [x]  Laboratory  [x]  Microbiology  [x]  Radiology  []  EKG/Telemetry   []  Cardiology/Vascular   []  Pathology  []  Old records  []  Other:  Most notable findings include:   -    Assessment & Plan   Assessment / Plan     Active Hospital Problems:  Active Hospital Problems    Diagnosis    • **Subdural hemorrhage (HCC)    • Subdural hematoma          Impression:  Acute subdural hematoma status post mechanical fall  Hypertension  Hyponatremia  Anemia     Plan:  Neuro  -neurologic exam unremarkable, no focal deficit    -Neurosurgery on board     Cardiovascular  -Cardene drip with systolic blood pressure goal less than 140  -We will start amlodipine today     Pulmonary  -on room air  -Checks x-ray showed hilar fullness concerning for increased vascular congestion, f/ u 2D echo     GI  -Continue diet as tolerated     Renal  -Replace electrolytes PRN to keep K 4.0, Mag 2.0, Phos 4.0.     Endo  -Keep glucose 140-180 while in ICU. Cont SSI.     HemOnc  -transfuse to keep Hgb >7     ID  -No acute issues     DVT ppx: No AC due to subdural hematoma  GI ppx: PPI  Lines: Peripheral IVs       DVT prophylaxis:  Mechanical DVT prophylaxis orders are present.    CODE STATUS:   Medical Intervention Limits: NO intubation (DNI)  Level Of Support Discussed With: Patient  Code Status (Patient has no pulse and is not breathing): No CPR (Do Not Attempt to Resuscitate)  Medical Interventions (Patient has pulse or is breathing): Limited Support  Release to patient: Routine Release    Patient is critically ill in the ICU with acute CVA, hypertension requiring Cardene drip. 32 minutes of critical care time was spent managing this patient, excluding procedures. This included personally reviewing all pertinent labs, imaging, microbiology and documentation. Also discussing the case with the patient and any available family, the admitting physician and any available ancillary staff.     Electronically signed by aLyla Garcia MD, 23, 12:11 PM EST.         Electronically signed by Layla Garcia MD at 23 1219     Chema Booker MD at 23 0751           [facility]   Neurosurgery Progress Note    Patient Name: Rebecca Mchugh  : 1950  MRN: [id number]  Date of admission: 2023  Surgical Procedures Since Admission:    Subjective   Subjective     Chief Complaint: No new complaints.  Desires to go home.    History of Present Illness   Falcine subdural hematoma was stable on repeat CT scan yesterday.  She denies any headache or other  issues at the present time.  She is hopeful to go home.      Objective   Objective     Vitals:   Temp:  [98.2 °F (36.8 °C)-99.4 °F (37.4 °C)] 98.3 °F (36.8 °C)  Heart Rate:  [69-90] 74  Resp:  [16] 16  BP: ()/() 122/55  Flow (L/min):  [1-2] 2    She is awake and alert.  She is oriented.         Assessment & Plan   Assessment / Plan     Brief Patient Summary:  Rebecca Mchugh is a 72 y.o. female who has a falcine subdural hematoma stable on CT.    Active Hospital Problems:  Active Hospital Problems    Diagnosis    • **Subdural hemorrhage (HCC)    • Subdural hematoma      Plan:   From my standpoint she could be discharged home once medically stable.  She can get a head CT scan in 2 weeks and I can review this in my office.  If there are any abnormal findings we can see her in follow-up         Electronically signed by Chema Booker MD at 02/09/23 8687       Consult Notes (last 48 hours)  Notes from 02/08/23 1347 through 02/10/23 1347   No notes of this type exist for this encounter.

## 2023-02-10 NOTE — PLAN OF CARE
Goal Outcome Evaluation:           Progress: improving   VSS. Pt has rested this shift. Plans to discharge today

## 2023-02-10 NOTE — SIGNIFICANT NOTE
02/10/23 1130   Coping/Psychosocial   Observed Emotional State calm;cooperative   Verbalized Emotional State hopefulness;relief   Trust Relationship/Rapport empathic listening provided   Involvement in Care interacting with patient   Additional Documentation Spiritual Care (Group)   Spiritual Care   Use of Spiritual Resources non-Lutheran use of spiritual care   Spiritual Care Source  initiative   Spiritual Care Follow-Up follow-up, none required as presently assessed   Response to Spiritual Care engaged in conversation;receptive of support   Spiritual Care Interventions supportive conversation provided   Spiritual Care Visit Type initial   Receptivity to Spiritual Care visit welcomed

## 2023-02-10 NOTE — PLAN OF CARE
Goal Outcome Evaluation:              Outcome Evaluation: VSS. No complainted overnight. No pain. Pt alert and oriented. Continue monitor

## 2023-02-10 NOTE — PROGRESS NOTES
Pulmonary / Critical Care Progress Note      Patient Name: Rebecca Mchugh  : 1950  MRN: [id number]  Attending:  Eyad Martinez MD   Date of admission: 2023    Subjective   Subjective   Follow up for subdural hemorrhage.    Over past 24 hours, transferred out of the ICU.  BP has been controlled 120-140 systolically.    No acute events overnight.    This morning,  Lying in bed on room air  Denies any dyspnea  BP stable 120-140  Denies any headache  Denies any dizziness  Denies any extremity weakness  No nausea or vomiting  Has been up ambulating  Plans to discharge home today    Review of Systems  Constitutional symptoms:  Denied complaints   Ear, nose, throat: Denied complaints  Cardiovascular:  Denied complaints  Respiratory: Denied complaints  Gastrointestinal: Denied complaints  Musculoskeletal: Denied complaints  Neurologic: Denied complaints  Skin: Denied complaints    Objective   Objective     Vitals:   Vital signs for last 24 hours:  Temp:  [97.7 °F (36.5 °C)-99 °F (37.2 °C)] 97.7 °F (36.5 °C)  Heart Rate:  [64-83] 64  Resp:  [16-19] 18  BP: (121-148)/(58-90) 121/63    Physical Exam   Vital Signs Reviewed   General:  WDWN female, awake, NAD on room air  HEENT:  PERRL, EOMI.  OP  Chest:  Clear to auscultation bilaterally, no work of breathing noted on room air  CV: RRR, no MGR, pulses 2+, equal  Abd:  Soft, NT, ND, + BS  EXT:  no clubbing, no cyanosis, no edema  Neuro:  A&Ox3, CN grossly intact, no focal deficits  Skin: No rashes or lesions noted    Result Review    Result Review:  I have personally reviewed the results from the time of this admission to 2/10/2023 07:43 EST and agree with these findings:  [x]  Laboratory  [x]  Microbiology  [x]  Radiology  []  EKG/Telemetry   []  Cardiology/Vascular   []  Pathology  []  Old records  []  Other:  Most notable findings include:         Lab 02/10/23  0659 23  0308 23  0344 23   WBC 3.97 4.03 5.14 5.30   HEMOGLOBIN 11.3* 10.1*  10.3* 11.1*   HEMATOCRIT 34.3 29.6* 30.6* 33.4*   PLATELETS 239 223 248 264   SODIUM 133* 133* 133* 129*   POTASSIUM 4.2 4.0 3.9 4.0   CHLORIDE 102 102 100 93*   CO2 20.7* 22.5 23.4 24.5   BUN 11 8 10 12   CREATININE 1.09* 1.16* 1.08* 1.19*   GLUCOSE 121* 98 137* 151*   CALCIUM 9.3 8.9 8.6 9.1   PHOSPHORUS  --  3.9 3.2  --    TOTAL PROTEIN  --  5.7*  --  7.3   ALBUMIN  --  3.3*  --  4.3   GLOBULIN  --  2.4  --  3.0     Assessment & Plan   Assessment / Plan     Active Hospital Problems:  Active Hospital Problems    Diagnosis     **Subdural hemorrhage (HCC)     Subdural hematoma      Impression:  Acute subdural hematoma status post mechanical fall  Hypertension  Anemia     Plan:  -On room air.  -Continue Norvasc, lisinopril, and Coreg.  -Neurosurgery on board and appreciate assistance.  To have repeat CT scan in 2 weeks.  -Echocardiogram with EF 61% and grade 1 diastolic dysfunction.  -Encourage mobilization.  Out of bed to chair.     -Okay from ICU/pulmonary standpoint to discharge patient home.  -No need to follow-up in our office.    DVT prophylaxis:  Mechanical DVT prophylaxis orders are present.    CODE STATUS:   Medical Intervention Limits: NO intubation (DNI)  Level Of Support Discussed With: Patient  Code Status (Patient has no pulse and is not breathing): No CPR (Do Not Attempt to Resuscitate)  Medical Interventions (Patient has pulse or is breathing): Limited Support  Release to patient: Routine Release    Reviewed labs, imaging and provider notes.   Discussed with bedside nurse and primary service.     Electronically signed by TITO Scherer, 02/10/23, 10:07 AM EST.    This visit was performed by BOTH a physician and an APC. I personally evaluated and examined the patient. I performed all aspects of MDM as documented. , I have reviewed and confirmed the accuracy of the patient's history as documented in this note., and I have reexamined the patient and the results are consistent with the previously  documented exam. I have updated the documentation as necessary.     Electronically signed by Timmy Hastings MD, 02/10/23, 4:03 PM EST.   Electronically signed by Timmy Hastings MD, 02/10/23, 7:44 AM EST.

## 2023-02-11 ENCOUNTER — READMISSION MANAGEMENT (OUTPATIENT)
Dept: CALL CENTER | Facility: HOSPITAL | Age: 73
End: 2023-02-11
Payer: MEDICARE

## 2023-02-13 NOTE — PROGRESS NOTES
"Enter Query Response Below      Query Response:     Hyponatremia, clinically significant         If applicable, please update the problem list.       Patient: Rebecca Mchugh        : 1950  Account: [id number]           Admit Date: 2023        How to Respond to this query:       a. Click New Note     b. Answer query within the yellow box.                c. Update the Problem List, if applicable.      If you have any questions about this query contact me at:  [email or web address]    Dr. Garcia,     The 23 Pulmonary Consult through the  Pulmonary Progress Note includes \"Hyponatremia.\"  Treatment included Normal Saline @ 100 cc/hr .     23 Sodium 129; Glucose 151  23 Sodium 133; Glucose 137  23 Sodium 133; Glucose  98  2/10/23 Sodium 133; Glucose 121      Hyponatremia-clinically significant  Hyponatremia-clinically insignificant  Other (please specify)_______  Unable to determine    By submitting this query, we are merely seeking further clarification of documentation to accurately reflect all conditions that you are monitoring, evaluating, treating or that extend the hospitalization or utilize additional resources of care. Please utilize your independent clinical judgment when addressing the question(s) above.     This query and your response, once completed, will be entered into the legal medical record.    Sincerely,  Carina BINGHAM, RN, CDIS  Clinical Documentation Integrity Program   [email or web address]  "

## 2023-02-15 ENCOUNTER — READMISSION MANAGEMENT (OUTPATIENT)
Dept: CALL CENTER | Facility: HOSPITAL | Age: 73
End: 2023-02-15
Payer: MEDICARE

## 2023-02-15 LAB — QT INTERVAL: 381 MS

## 2023-02-15 NOTE — OUTREACH NOTE
Stroke Week 1 Survey    Flowsheet Row Responses   Religion facility patient discharged from? [facility]   Does the patient have one of the following disease processes/diagnoses(primary or secondary)? Stroke   Week 1 attempt successful? No   Unsuccessful attempts Attempt 1          JAIME DIOP - Registered Nurse

## 2023-02-21 ENCOUNTER — READMISSION MANAGEMENT (OUTPATIENT)
Dept: CALL CENTER | Facility: HOSPITAL | Age: 73
End: 2023-02-21
Payer: MEDICARE

## 2023-02-21 NOTE — OUTREACH NOTE
Stroke Week 2 Survey    Flowsheet Row Responses   [facility] patient discharged from? [facility]   Does the patient have one of the following disease processes/diagnoses(primary or secondary)? Stroke   Week 2 attempt successful? Yes   Call start time 1617   Call end time 1622   Discharge diagnosis Subdural hemorrhage    Meds reviewed with patient/caregiver? Yes   Is the patient having any side effects they believe may be caused by any medication additions or changes? No   Does the patient have all medications ordered at discharge? Yes   Is the patient taking all medications as directed (includes completed medication regime)? Yes   Does the patient have a primary care provider?  Yes   Does the patient have an appointment with their PCP within 7 days of discharge? Yes   Has the patient kept scheduled appointments due by today? Yes   Has home health visited the patient within 72 hours of discharge? N/A   Psychosocial issues? No   Does the patient require any assistance with activities of daily living such as eating, bathing, dressing, walking, etc.? No   Does the patient have any residual symptoms from stroke/TIA? No   Did the patient receive a copy of their discharge instructions? Yes   What is the patient's perception of their health status since discharge? Improving   Nursing interventions Nurse provided patient education   Is the patient able to teach back FAST for Stroke? B alance: Watch for sudden loss of balance, E yes: Check for vision loss, F ace: Look for an uneven smile, A rm: Check if one arm is weak, S peech: Listen for slurred speech, T annita: Call 9-1-1 right away   Is the patient/caregiver able to teach back signs and symptoms related to disease process for when to call PCP? Yes   Is the patient/caregiver able to teach back signs and symptoms related to disease process for when to call 911? Yes   Is the patient/caregiver able to teach back the hierarchy of who to call/visit for symptoms/problems? PCP,  Specialist, Home health nurse, Urgent Care, ED, 911 Yes   Week 2 call completed? Yes   Wrap up additional comments Pt reports she is doign well and reports she recieved very good care. Pt questioned how long to hold the ASA. She does have CT scan coming up and advised Pt to continue to hold as ordered and discuss with neuro on when she can start back.           CHERYL VILLALTA - Registered Nurse

## 2023-02-24 ENCOUNTER — HOSPITAL ENCOUNTER (OUTPATIENT)
Dept: CT IMAGING | Facility: HOSPITAL | Age: 73
Discharge: HOME | End: 2023-02-24
Admitting: PHYSICIAN ASSISTANT
Payer: MEDICARE

## 2023-02-24 DIAGNOSIS — S06.5XAA SUBDURAL HEMATOMA: ICD-10-CM

## 2023-02-24 PROCEDURE — 70450 CT HEAD/BRAIN W/O DYE: CPT

## 2023-03-03 ENCOUNTER — READMISSION MANAGEMENT (OUTPATIENT)
Dept: CALL CENTER | Facility: HOSPITAL | Age: 73
End: 2023-03-03
Payer: MEDICARE

## 2023-03-03 NOTE — OUTREACH NOTE
Stroke Week 3 Survey    Flowsheet Row Responses   [facility] patient discharged from? [facility]   Does the patient have one of the following disease processes/diagnoses(primary or secondary)? Stroke   Week 3 attempt successful? Yes   Call start time 1436   Call end time 1438   Discharge diagnosis Subdural hemorrhage    Medication alerts for this patient Pt reports she is out of the 3 new BP meds she was given at [place]. Advised to call her PCP for refills if he deems appropriate.   Meds reviewed with patient/caregiver? Yes   Is the patient having any side effects they believe may be caused by any medication additions or changes? No   Is the patient taking all medications as directed (includes completed medication regime)? Yes   Medication comments 138/83 today.   Has the patient kept scheduled appointments due by today? Yes   Psychosocial issues? No   Does the patient require any assistance with activities of daily living such as eating, bathing, dressing, walking, etc.? No   Does the patient have any residual symptoms from stroke/TIA? No   What is the patient's perception of their health status since discharge? Returned to baseline/stable   Nursing interventions Nurse provided patient education, Advised patient to call provider   Is the patient/caregiver able to teach back the risk factors for a stroke? High blood pressure-goal below 120/80   Is the patient/caregiver able to teach back signs and symptoms related to disease process for when to call PCP? Yes   Week 3 call completed? Yes   Revoked No further contact(revokes)-requires comment   Is the patient interested in additional calls from an ambulatory ?  NOTE:  applies to high risk patients requiring additional follow-up. No   Graduated/Revoked comments leodan BEE - Registered Nurse

## 2023-04-28 ENCOUNTER — HOSPITAL ENCOUNTER (OUTPATIENT)
Dept: MAMMOGRAPHY | Facility: HOSPITAL | Age: 73
Discharge: HOME | End: 2023-04-28
Payer: MEDICARE

## 2023-04-28 DIAGNOSIS — Z12.31 SCREENING MAMMOGRAM FOR BREAST CANCER: ICD-10-CM

## 2023-06-15 ENCOUNTER — HOSPITAL ENCOUNTER (EMERGENCY)
Facility: HOSPITAL | Age: 73
Discharge: OTHER INSTITUTION | End: 2023-06-16
Attending: EMERGENCY MEDICINE
Payer: MEDICARE

## 2023-06-15 ENCOUNTER — APPOINTMENT (OUTPATIENT)
Dept: CT IMAGING | Facility: HOSPITAL | Age: 73
End: 2023-06-15
Payer: MEDICARE

## 2023-06-15 ENCOUNTER — APPOINTMENT (OUTPATIENT)
Dept: GENERAL RADIOLOGY | Facility: HOSPITAL | Age: 73
End: 2023-06-15
Payer: MEDICARE
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
## 2023-06-15 DIAGNOSIS — S06.5XAA SUBDURAL HEMATOMA: Primary | ICD-10-CM

## 2023-06-15 DIAGNOSIS — S52.501A CLOSED FRACTURE OF DISTAL END OF RIGHT RADIUS, UNSPECIFIED FRACTURE MORPHOLOGY, INITIAL ENCOUNTER: ICD-10-CM

## 2023-06-15 DIAGNOSIS — S52.601A CLOSED FRACTURE OF DISTAL END OF RIGHT ULNA, UNSPECIFIED FRACTURE MORPHOLOGY, INITIAL ENCOUNTER: ICD-10-CM

## 2023-06-15 LAB
ALBUMIN SERPL-MCNC: 3.5 G/DL (ref 3.5–5.2)
ALBUMIN/GLOB SERPL: 1.2 G/DL
ALP SERPL-CCNC: 147 U/L (ref 39–117)
ALT SERPL W P-5'-P-CCNC: 18 U/L (ref 1–33)
ANION GAP SERPL CALCULATED.3IONS-SCNC: 11.8 MMOL/L (ref 5–15)
AST SERPL-CCNC: 16 U/L (ref 1–32)
BASOPHILS # BLD AUTO: 0.05 10*3/MM3 (ref 0–0.2)
BASOPHILS NFR BLD AUTO: 0.5 % (ref 0–1.5)
BILIRUB SERPL-MCNC: 0.5 MG/DL (ref 0–1.2)
BUN SERPL-MCNC: 17 MG/DL (ref 8–23)
BUN/CREAT SERPL: 14.3 (ref 7–25)
CALCIUM SPEC-SCNC: 8.9 MG/DL (ref 8.6–10.5)
CHLORIDE SERPL-SCNC: 96 MMOL/L (ref 98–107)
CO2 SERPL-SCNC: 24.2 MMOL/L (ref 22–29)
CREAT SERPL-MCNC: 1.19 MG/DL (ref 0.57–1)
DEPRECATED RDW RBC AUTO: 44.5 FL (ref 37–54)
EGFRCR SERPLBLD CKD-EPI 2021: 47.8 ML/MIN/1.73
EOSINOPHIL # BLD AUTO: 0.38 10*3/MM3 (ref 0–0.4)
EOSINOPHIL NFR BLD AUTO: 3.8 % (ref 0.3–6.2)
ERYTHROCYTE [DISTWIDTH] IN BLOOD BY AUTOMATED COUNT: 14.1 % (ref 12.3–15.4)
GLOBULIN UR ELPH-MCNC: 3 GM/DL
GLUCOSE SERPL-MCNC: 159 MG/DL (ref 65–99)
HCT VFR BLD AUTO: 31.9 % (ref 34–46.6)
HGB BLD-MCNC: 10.6 G/DL (ref 12–15.9)
HOLD SPECIMEN: NORMAL
HOLD SPECIMEN: NORMAL
IMM GRANULOCYTES # BLD AUTO: 0.06 10*3/MM3 (ref 0–0.05)
IMM GRANULOCYTES NFR BLD AUTO: 0.6 % (ref 0–0.5)
LYMPHOCYTES # BLD AUTO: 1.39 10*3/MM3 (ref 0.7–3.1)
LYMPHOCYTES NFR BLD AUTO: 13.8 % (ref 19.6–45.3)
MAGNESIUM SERPL-MCNC: 1.6 MG/DL (ref 1.6–2.4)
MCH RBC QN AUTO: 28.6 PG (ref 26.6–33)
MCHC RBC AUTO-ENTMCNC: 33.2 G/DL (ref 31.5–35.7)
MCV RBC AUTO: 86 FL (ref 79–97)
MONOCYTES # BLD AUTO: 1.01 10*3/MM3 (ref 0.1–0.9)
MONOCYTES NFR BLD AUTO: 10 % (ref 5–12)
NEUTROPHILS NFR BLD AUTO: 7.21 10*3/MM3 (ref 1.7–7)
NEUTROPHILS NFR BLD AUTO: 71.3 % (ref 42.7–76)
NRBC BLD AUTO-RTO: 0 /100 WBC (ref 0–0.2)
PLATELET # BLD AUTO: 338 10*3/MM3 (ref 140–450)
PMV BLD AUTO: 8.9 FL (ref 6–12)
POTASSIUM SERPL-SCNC: 4.8 MMOL/L (ref 3.5–5.2)
PROT SERPL-MCNC: 6.5 G/DL (ref 6–8.5)
RBC # BLD AUTO: 3.71 10*6/MM3 (ref 3.77–5.28)
SODIUM SERPL-SCNC: 132 MMOL/L (ref 136–145)
TROPONIN T SERPL HS-MCNC: <6 NG/L
WBC NRBC COR # BLD: 10.1 10*3/MM3 (ref 3.4–10.8)
WHOLE BLOOD HOLD COAG: NORMAL
WHOLE BLOOD HOLD SPECIMEN: NORMAL

## 2023-06-15 PROCEDURE — 85025 COMPLETE CBC W/AUTO DIFF WBC: CPT

## 2023-06-15 PROCEDURE — 73080 X-RAY EXAM OF ELBOW: CPT

## 2023-06-15 PROCEDURE — 84484 ASSAY OF TROPONIN QUANT: CPT | Performed by: EMERGENCY MEDICINE

## 2023-06-15 PROCEDURE — 36415 COLL VENOUS BLD VENIPUNCTURE: CPT

## 2023-06-15 PROCEDURE — 73110 X-RAY EXAM OF WRIST: CPT

## 2023-06-15 PROCEDURE — 93005 ELECTROCARDIOGRAM TRACING: CPT | Performed by: EMERGENCY MEDICINE

## 2023-06-15 PROCEDURE — 80053 COMPREHEN METABOLIC PANEL: CPT | Performed by: EMERGENCY MEDICINE

## 2023-06-15 PROCEDURE — 70450 CT HEAD/BRAIN W/O DYE: CPT

## 2023-06-15 PROCEDURE — 83735 ASSAY OF MAGNESIUM: CPT | Performed by: EMERGENCY MEDICINE

## 2023-06-15 RX ORDER — SODIUM CHLORIDE 0.9 % (FLUSH) 0.9 %
10 SYRINGE (ML) INJECTION AS NEEDED
Status: DISCONTINUED | OUTPATIENT
Start: 2023-06-15 | End: 2023-06-16 | Stop reason: HOSPADM

## 2023-06-15 RX ORDER — MORPHINE SULFATE 2 MG/ML
2 INJECTION, SOLUTION INTRAMUSCULAR; INTRAVENOUS ONCE
Status: COMPLETED | OUTPATIENT
Start: 2023-06-15 | End: 2023-06-16

## 2023-06-16 ENCOUNTER — ANESTHESIA EVENT (OUTPATIENT)
Dept: PERIOP | Facility: HOSPITAL | Age: 73
End: 2023-06-16
Payer: MEDICARE

## 2023-06-16 ENCOUNTER — ANESTHESIA (OUTPATIENT)
Dept: PERIOP | Facility: HOSPITAL | Age: 73
End: 2023-06-16
Payer: MEDICARE

## 2023-06-16 VITALS
HEIGHT: 64 IN | OXYGEN SATURATION: 95 % | WEIGHT: 141.09 LBS | SYSTOLIC BLOOD PRESSURE: 155 MMHG | HEART RATE: 81 BPM | TEMPERATURE: 97.8 F | DIASTOLIC BLOOD PRESSURE: 56 MMHG | RESPIRATION RATE: 18 BRPM | BODY MASS INDEX: 24.09 KG/M2

## 2023-06-16 PROBLEM — I61.9 ICH (INTRACEREBRAL HEMORRHAGE): Status: ACTIVE | Noted: 2023-06-16

## 2023-06-16 LAB
GLUCOSE BLDC GLUCOMTR-MCNC: 141 MG/DL (ref 70–99)
QT INTERVAL: 362 MS

## 2023-06-16 PROCEDURE — 25010000002 SUGAMMADEX 200 MG/2ML SOLUTION: Performed by: ANESTHESIOLOGY

## 2023-06-16 PROCEDURE — 25010000002 ONDANSETRON PER 1 MG: Performed by: ANESTHESIOLOGY

## 2023-06-16 PROCEDURE — 25010000002 FENTANYL CITRATE (PF) 50 MCG/ML SOLUTION: Performed by: ANESTHESIOLOGY

## 2023-06-16 PROCEDURE — 25010000002 MORPHINE PER 10 MG: Performed by: EMERGENCY MEDICINE

## 2023-06-16 PROCEDURE — 82948 REAGENT STRIP/BLOOD GLUCOSE: CPT

## 2023-06-16 PROCEDURE — 25010000002 DEXAMETHASONE SODIUM PHOSPHATE 20 MG/5ML SOLUTION: Performed by: ANESTHESIOLOGY

## 2023-06-16 PROCEDURE — 25010000002 PROPOFOL 10 MG/ML EMULSION: Performed by: ANESTHESIOLOGY

## 2023-06-16 RX ORDER — LIDOCAINE HYDROCHLORIDE 20 MG/ML
INJECTION, SOLUTION INFILTRATION; PERINEURAL AS NEEDED
Status: DISCONTINUED | OUTPATIENT
Start: 2023-06-16 | End: 2023-06-16 | Stop reason: SURG

## 2023-06-16 RX ORDER — PHENYLEPHRINE HCL IN 0.9% NACL 1 MG/10 ML
SYRINGE (ML) INTRAVENOUS AS NEEDED
Status: DISCONTINUED | OUTPATIENT
Start: 2023-06-16 | End: 2023-06-16 | Stop reason: SURG

## 2023-06-16 RX ORDER — PROPOFOL 10 MG/ML
VIAL (ML) INTRAVENOUS AS NEEDED
Status: DISCONTINUED | OUTPATIENT
Start: 2023-06-16 | End: 2023-06-16 | Stop reason: SURG

## 2023-06-16 RX ORDER — FENTANYL CITRATE 50 UG/ML
INJECTION, SOLUTION INTRAMUSCULAR; INTRAVENOUS AS NEEDED
Status: DISCONTINUED | OUTPATIENT
Start: 2023-06-16 | End: 2023-06-16 | Stop reason: SURG

## 2023-06-16 RX ORDER — ONDANSETRON 2 MG/ML
INJECTION INTRAMUSCULAR; INTRAVENOUS AS NEEDED
Status: DISCONTINUED | OUTPATIENT
Start: 2023-06-16 | End: 2023-06-16 | Stop reason: SURG

## 2023-06-16 RX ORDER — EPHEDRINE SULFATE 50 MG/ML
INJECTION, SOLUTION INTRAVENOUS AS NEEDED
Status: DISCONTINUED | OUTPATIENT
Start: 2023-06-16 | End: 2023-06-16 | Stop reason: SURG

## 2023-06-16 RX ORDER — DEXAMETHASONE SODIUM PHOSPHATE 4 MG/ML
INJECTION, SOLUTION INTRA-ARTICULAR; INTRALESIONAL; INTRAMUSCULAR; INTRAVENOUS; SOFT TISSUE AS NEEDED
Status: DISCONTINUED | OUTPATIENT
Start: 2023-06-16 | End: 2023-06-16 | Stop reason: SURG

## 2023-06-16 RX ORDER — ROCURONIUM BROMIDE 10 MG/ML
INJECTION, SOLUTION INTRAVENOUS AS NEEDED
Status: DISCONTINUED | OUTPATIENT
Start: 2023-06-16 | End: 2023-06-16 | Stop reason: SURG

## 2023-06-16 RX ADMIN — FENTANYL CITRATE 50 MCG: 50 INJECTION, SOLUTION INTRAMUSCULAR; INTRAVENOUS at 11:55

## 2023-06-16 RX ADMIN — ROCURONIUM BROMIDE 40 MG: 50 INJECTION INTRAVENOUS at 11:20

## 2023-06-16 RX ADMIN — Medication 200 MCG: at 12:04

## 2023-06-16 RX ADMIN — ONDANSETRON 4 MG: 2 INJECTION INTRAMUSCULAR; INTRAVENOUS at 12:35

## 2023-06-16 RX ADMIN — MORPHINE SULFATE 2 MG: 2 INJECTION, SOLUTION INTRAMUSCULAR; INTRAVENOUS at 01:01

## 2023-06-16 RX ADMIN — Medication 100 MCG: at 11:50

## 2023-06-16 RX ADMIN — Medication 200 MCG: at 12:17

## 2023-06-16 RX ADMIN — DEXAMETHASONE SODIUM PHOSPHATE 4 MG: 4 INJECTION, SOLUTION INTRAMUSCULAR; INTRAVENOUS at 11:50

## 2023-06-16 RX ADMIN — EPHEDRINE SULFATE 10 MG: 50 INJECTION INTRAVENOUS at 12:12

## 2023-06-16 RX ADMIN — LIDOCAINE HYDROCHLORIDE 100 MG: 20 INJECTION, SOLUTION INFILTRATION; PERINEURAL at 11:20

## 2023-06-16 RX ADMIN — EPHEDRINE SULFATE 10 MG: 50 INJECTION INTRAVENOUS at 12:29

## 2023-06-16 RX ADMIN — Medication 300 MCG: at 12:34

## 2023-06-16 RX ADMIN — SUGAMMADEX 200 MG: 100 INJECTION, SOLUTION INTRAVENOUS at 12:43

## 2023-06-16 RX ADMIN — PROPOFOL 100 MG: 10 INJECTION, EMULSION INTRAVENOUS at 11:20

## 2023-06-16 NOTE — ED PROVIDER NOTES
Time: 10:23 PM EDT  Date of encounter:  6/15/2023  Independent Historian/Clinical History and Information was obtained by:   Patient  Chief Complaint   Patient presents with    Arm Injury       History is limited by: N/A    History of Present Illness:  Patient is a 73 y.o. year old female who presents to the emergency department for evaluation of syncopal episode while walking dog just prior to arrival.  Denies any prodromal symptoms including chest pain, palpitations, shortness of breath, dizziness.  Did hit head.  Complaining of pain to right wrist and elbow. (Mary Ferrari PA-C provider in triage 10:24 PM EDT )     Patient was walking her 5lb chiALTO CINCOua and tripped over her around 4-5pm. She knows she passed out for a few minutes because she moved off of her right side.Patient thinks she stumbled and then fell and lost consciousness when she hit the ground. Patient has pain in her right wrist and elbow. Patient knew of a subdural hematoma which was potentially reinjured today. Similar situation of tripping over her dog occurred last time she had a hematoma. Patient takes blood thinners for her diabetes. Patient denies nausea,     HPI    Patient Care Team  Primary Care Provider: Colin Jamil MD    Past Medical History:     Allergies   Allergen Reactions    [place] Hives     Past Medical History:   Diagnosis Date    Acid reflux     Ankle fracture 2020    Bladder disorder     Breast cancer     Right    Cancer     Colitis     Diabetes     Diabetes mellitus     GERD (gastroesophageal reflux disease)     History of spinal fracture 11/2020    Hyperlipemia     Recurrent UTI (urinary tract infection)     Renal calculus or stone     Seasonal allergies     Trigger thumb of right hand 6/17/2021    Urinary incontinence in female      Past Surgical History:   Procedure Laterality Date    ABDOMINAL HYSTERECTOMY      BACK SURGERY      HERNIATED DISCS  X3 OR 4 TIMES     BLADDER REPAIR  2002    BREAST BIOPSY      BREAST  LUMPECTOMY Right     breast mass    CHOLECYSTECTOMY      COLONOSCOPY      CYSTOSCOPY      with dilation    GALLBLADDER SURGERY      INTERSTIM PLACEMENT  2018-19?    INTRAOCULAR LENS INSERTION      TRIGGER FINGER RELEASE Right 6/17/2021    Procedure: RIGHT FINGER TRIGGER THUMB RELEASE;  Surgeon: Eyad Griffith MD;  Location[place] [facility] [place] [facility] [place]  Service: Orthopedics;  Laterality: Right;    VAGINAL MESH REVISION      vaginal approach per Dr. Liao     Family History   Problem Relation Age of Onset    Cancer Mother         unspecified    Breast cancer Mother         30s    Heart disease Brother     Cancer Brother         unspecified    Diabetes Brother         unspecified type    [place] Hyperthermia Neg Hx        Home Medications:  Prior to Admission medications    Medication Sig Start Date End Date Taking? Authorizing Provider   amitriptyline (ELAVIL) 25 MG tablet Take 25 mg by mouth Every Night.    Ryne Reilly MD   amLODIPine (NORVASC) 10 MG tablet Take 1 tablet by mouth Daily. 2/10/23   Fabrizio Abraham PA   atorvastatin (LIPITOR) 40 MG tablet Take 40 mg by mouth Daily.    Ryne Reilly MD   calcium carbonate (OS-CHAPARRO) 600 MG tablet Take 600 mg by mouth 2 (Two) Times a Day With Meals.    Ryne Reilly MD   carvedilol (COREG) 6.25 MG tablet Take 1 tablet by mouth 2 (Two) Times a Day With Meals. 2/10/23   Fabrizio Abraham PA   cyclobenzaprine (FLEXERIL) 10 MG tablet Take 10 mg by mouth 3 (Three) Times a Day As Needed for Muscle Spasms.    Ryne Reilly MD   Dulaglutide (Trulicity) 0.75 MG/0.5ML solution pen-injector Inject 0.75 mg under the skin into the appropriate area as directed.    Ryne Reilly MD   fesoterodine fumarate (TOVIAZ ER) 8 MG tablet sustained-release 24 hour tablet Take 8 mg by mouth Daily.    Ryne Reilly MD   HYDROcodone-acetaminophen (NORCO) 7.5-325 MG per tablet Take 1 tablet by mouth Every 6 (Six) Hours As Needed for Moderate Pain.    Melba  MD Ryne   levocetirizine (XYZAL) 5 MG tablet Take 5 mg by mouth Every Evening.    Ryne Reilly MD   levothyroxine (SYNTHROID, LEVOTHROID) 25 MCG tablet Take 25 mcg by mouth Every Morning.    Ryne Reilly MD   lidocaine (Lidoderm) 5 % Place 1 patch on the skin as directed by provider Daily. Remove & Discard patch within 12 hours or as directed by MD 5/12/22   Golden Clark APRN   lisinopril (PRINIVIL,ZESTRIL) 10 MG tablet Take 1 tablet by mouth Daily. 2/10/23   Fabrizio Abraham PA   lubiprostone (AMITIZA) 24 MCG capsule Take 24 mcg by mouth 2 (Two) Times a Day With Meals.    Ryne Reilly MD   metFORMIN ER (GLUCOPHAGE-XR) 500 MG 24 hr tablet Take 500 mg by mouth 2 (Two) Times a Day.    Ryne Reilly MD   multivitamin with minerals tablet tablet Take 1 tablet by mouth Daily.    Ryne Reilly MD   nystatin (MYCOSTATIN) 100,000 unit/mL suspension Swish and swallow 500,000 Units 4 (Four) Times a Day.    Ryne Reilly MD   olopatadine (PATANOL) 0.1 % ophthalmic solution 1 drop 2 (Two) Times a Day.    Ryne Reilly MD   omeprazole (priLOSEC) 40 MG capsule Take 40 mg by mouth Daily.    Ryne Reilly MD   sulfamethoxazole-trimethoprim (BACTRIM DS,SEPTRA DS) 800-160 MG per tablet Take 1 tablet by mouth 2 (Two) Times a Day. 2/6/23   Ryne Reilly MD   traMADol (ULTRAM) 50 MG tablet Take 50 mg by mouth 2 (Two) Times a Day As Needed for Moderate Pain.    Ryne Reilly MD   traZODone (DESYREL) 100 MG tablet Take 100 mg by mouth Every Night.    Ryne Reilly MD        Social History:   Social History     Tobacco Use    Smoking status: Former     Packs/day: 1.00     Types: Cigarettes     Passive exposure: Past    Tobacco comments:     quit smoking at age 48   Vaping Use    Vaping Use: Never used   Substance Use Topics    Alcohol use: Never    Drug use: Never         Review of Systems:  Review of Systems   Constitutional:  Negative for  "chills and fever.   HENT:  Negative for congestion, ear pain and sore throat.    Eyes:  Negative for pain.   Respiratory:  Negative for cough, chest tightness and shortness of breath.    Cardiovascular:  Negative for chest pain.   Gastrointestinal:  Negative for abdominal pain, diarrhea, nausea and vomiting.   Genitourinary:  Negative for flank pain and hematuria.   Musculoskeletal:  Positive for arthralgias (Right wrist and elbow) and joint swelling (right wrist and elbow).   Skin:  Negative for pallor.   Neurological:  Positive for syncope. Negative for seizures and headaches.   All other systems reviewed and are negative.     Physical Exam:  /56   Pulse 81   Temp 97.8 °F (36.6 °C)   Resp 18   Ht 162.6 cm (64\")   Wt 64 kg (141 lb 1.5 oz)   SpO2 95%   BMI 24.22 kg/m²     Physical Exam  Vitals and nursing note reviewed.   Constitutional:       General: She is not in acute distress.     Appearance: Normal appearance. She is not toxic-appearing.   HENT:      Head: Normocephalic and atraumatic.      Mouth/Throat:      Mouth: Mucous membranes are moist.   Eyes:      General: No scleral icterus.  Cardiovascular:      Rate and Rhythm: Normal rate and regular rhythm.      Pulses: Normal pulses.      Heart sounds: Normal heart sounds.   Pulmonary:      Effort: Pulmonary effort is normal. No respiratory distress.      Breath sounds: Normal breath sounds.   Abdominal:      General: Abdomen is flat.      Palpations: Abdomen is soft.      Tenderness: There is no abdominal tenderness.   Musculoskeletal:         General: Swelling and tenderness present.      Right wrist: Tenderness present. Decreased range of motion.      Cervical back: Normal range of motion and neck supple.      Comments: Nerves intact in right wrist   Skin:     General: Skin is warm and dry.   Neurological:      Mental Status: She is alert and oriented to person, place, and time. Mental status is at baseline.      Comments: NIH stroke scale of " 0  GCS 15                Procedures:  Procedures      Medical Decision Making:      Comorbidities that affect care:    Hyperlipidemia, GERD, Bladder disorder, Ankle fracture, Trigger thumb of right hand, Colitis, Hyperlipidemia, , Cancer, Diabetes    External Notes reviewed:          The following orders were placed and all results were independently analyzed by me:  Orders Placed This Encounter   Procedures    XR Wrist 3+ View Right    XR Elbow 3+ View Right    CT Head Without Contrast    [place] Draw    Comprehensive Metabolic Panel    Magnesium    Single High Sensitivity Troponin T    CBC Auto Differential    Continuous Pulse Oximetry    Vital Signs    Obtain & Apply The Following- Upper extremity; Sling    POC Glucose Once    ECG 12 Lead ED Triage Standing Order; Syncope    CBC & Differential    Green Top (Gel)    Lavender Top    Gold Top - SST    Light Blue Top       Medications Given in the Emergency Department:  Medications   morphine injection 2 mg (2 mg Intravenous Given 6/16/23 0101)        ED Course:    The patient was initially evaluated in the triage area where orders were placed. The patient was later dispositioned by Tayo Nguyen MD.      The patient was advised to stay for completion of workup which includes but is not limited to communication of labs and radiological results, reassessment and plan. The patient was advised that leaving prior to disposition by a provider could result in critical findings that are not communicated to the patient.     ED Course as of 06/16/23 0829   u Blu 15, 2023   5232 I was alerted by the radiologist Dr. Hodges of the patient's acute on chronic subdural hematoma with evidence of mass effect. [JS]   2222 On evaluation the patient she appears awake alert appropriate with no evidence of neurologic deficit on exam.  She is complaining of right wrist pain.  She appears to have distal radial and ulnar nondisplaced fractures which will be placed in sugar-tong splint.  [JS]   Fri Jun 16, 2023   0147 I discussed patient's presentation and work-up with neurosurgery at [facility] states patient has no emergent surgical intervention and requests that I transfer the patient to another institution due to their current bed status. [JS]   0148 I discussed patient with Dr. Heart of neurosurgery at [facility] who recommends transfer to their hospital.  I discussed patient with  of the intensive care unit who accepts patient in transfer.  Patient's vital signs and respiratory status are safe for transport at this time. [JS]      ED Course User Index  [JS] Tayo Nguyen MD       Labs:    Lab Results (last 24 hours)       Procedure Component Value Units Date/Time    CBC & Differential [[id number]]  (Abnormal) Collected: 06/15/23 2224    Specimen: Blood from Arm, Right Updated: 06/15/23 2245    Narrative:      The following orders were created for panel order CBC & Differential.  Procedure                               Abnormality         Status                     ---------                               -----------         ------                     CBC Auto Differential[[id number]]        Abnormal            Final result                 Please view results for these tests on the individual orders.    Comprehensive Metabolic Panel [[id number]]  (Abnormal) Collected: 06/15/23 2224    Specimen: Blood from Arm, Right Updated: 06/15/23 2309     Glucose 159 mg/dL      BUN 17 mg/dL      Creatinine 1.19 mg/dL      Sodium 132 mmol/L      Potassium 4.8 mmol/L      Chloride 96 mmol/L      CO2 24.2 mmol/L      Calcium 8.9 mg/dL      Total Protein 6.5 g/dL      Albumin 3.5 g/dL      ALT (SGPT) 18 U/L      AST (SGOT) 16 U/L      Alkaline Phosphatase 147 U/L      Total Bilirubin 0.5 mg/dL      Globulin 3.0 gm/dL      A/G Ratio 1.2 g/dL      BUN/Creatinine Ratio 14.3     Anion Gap 11.8 mmol/L      eGFR 47.8 mL/min/1.73     Narrative:      GFR Normal >60  Chronic Kidney  Disease <60  Kidney Failure <15    The GFR formula is only valid for adults with stable renal function between ages 18 and 70.    Magnesium [[id number]]  (Normal) Collected: 06/15/23 2224    Specimen: Blood from Arm, Right Updated: 06/15/23 2309     Magnesium 1.6 mg/dL     Single High Sensitivity Troponin T [[id number]]  (Normal) Collected: 06/15/23 2224    Specimen: Blood from Arm, Right Updated: 06/15/23 2315     HS Troponin T <6 ng/L     Narrative:      High Sensitive Troponin T Reference Range:  <10.0 ng/L- Negative Female for AMI  <15.0 ng/L- Negative Male for AMI  >=10 - Abnormal Female indicating possible myocardial injury.  >=15 - Abnormal Male indicating possible myocardial injury.   Clinicians would have to utilize clinical acumen, EKG, Troponin, and serial changes to determine if it is an Acute Myocardial Infarction or myocardial injury due to an underlying chronic condition.         CBC Auto Differential [[id number]]  (Abnormal) Collected: 06/15/23 2224    Specimen: Blood from Arm, Right Updated: 06/15/23 2245     WBC 10.10 10*3/mm3      RBC 3.71 10*6/mm3      Hemoglobin 10.6 g/dL      Hematocrit 31.9 %      MCV 86.0 fL      MCH 28.6 pg      MCHC 33.2 g/dL      RDW 14.1 %      RDW-SD 44.5 fl      MPV 8.9 fL      Platelets 338 10*3/mm3      Neutrophil % 71.3 %      Lymphocyte % 13.8 %      Monocyte % 10.0 %      Eosinophil % 3.8 %      Basophil % 0.5 %      Immature Grans % 0.6 %      Neutrophils, Absolute 7.21 10*3/mm3      Lymphocytes, Absolute 1.39 10*3/mm3      Monocytes, Absolute 1.01 10*3/mm3      Eosinophils, Absolute 0.38 10*3/mm3      Basophils, Absolute 0.05 10*3/mm3      Immature Grans, Absolute 0.06 10*3/mm3      nRBC 0.0 /100 WBC     POC Glucose Once [[id number]]  (Abnormal) Collected: 06/16/23 0005    Specimen: Blood Updated: 06/16/23 0007     Glucose 141 mg/dL      Comment: Serial Number: [id number]:  [id number]       POC Glucose Once [[id number]]  (Normal) Collected: 06/16/23 0528     Specimen: Blood Updated: 06/16/23 0531     Glucose 114 mg/dL      Comment: Meter: [id number] : [id number] Macy Kellogg RN                Imaging:    XR Elbow 3+ View Right    Result Date: 6/15/2023  PROCEDURE: XR ELBOW 3+ VW RIGHT  COMPARISON: None.  INDICATIONS: h/o fall; trauma/injury involving the right elbow; pain in right elbow  FINDINGS: Three views were obtained.  There is a large right elbow joint effusion.  There may be a subtle acute nondisplaced Uriah type 1 probably extra-articular closed fracture of the proximal right radius, centered at its head-neck junction.  Minimal, if any, angulation deformity is associated with the large distal fracture fragment.  No other acute fractures are seen.  No dislocation.  Mild degenerative and enthesopathic changes involve the right elbow.  Acute soft tissue contusion is seen posteriorly.       There is a possible acute nondisplaced proximal right radial fracture, as with a Uriah type 1 fracture.  No other acute fractures are seen.  No dislocation.  A large right elbow joint effusion is seen.     Please note that portions of this note were completed with a voice recognition program.  MAJOR CARRILLO JR, MD       Electronically Signed and Approved By: MAJOR CARRILLO JR, MD on 6/15/2023 at 23:27              XR Wrist 3+ View Right    Result Date: 6/16/2023  PROCEDURE: XR WRIST 3+ VW RIGHT  COMPARISON: None.  INDICATIONS: fall; pain in right wrist  FINDINGS: Three views of the right wrist reveal acute comminuted nondisplaced intra-articular closed fractures of the distal right radius.  These fractures are probably a Frykman type 4 fracture.  There is also an acute nondisplaced fracture of the ulnar styloid process, which is closed and extra-articular.  It is transversely oriented.  Acute soft tissue contusions involve the distal right forearm and the right wrist.  There is generalized osteopenia.  No other acute fractures are seen.  No dislocation.  No retained  radiopaque foreign body or subcutaneous emphysema.       There are acute nondisplaced fractures of the distal right radius and ulna, as discussed.  No dislocation is seen.     Please note that portions of this note were completed with a voice recognition program.  MAJOR CARRILLO JR, MD       Electronically Signed and Approved By: MAJOR CARRILLO JR, MD on 6/16/2023 at 0:23              CT Head Without Contrast    Result Date: 6/15/2023  PROCEDURE: CT HEAD WO CONTRAST  COMPARISONS: 2/24/2023; 2/8/2023; 2/7/2023.  INDICATIONS: FALL; HIT TOP OF HEAD; NO LOSS OF CONSCIOUSNESS; NO OTHER HEAD COMPLAINTS.  PROTOCOL:   Standard CT imaging protocol performed.    RADIATION:   Total DLP: 954.7 mGy*cm.   MA and/or KV were/was adjusted to minimize radiation dose.    TECHNIQUE: After obtaining the patient's consent, 295 CT images were obtained without non-ionic intravenous contrast material.  Two-dimensional sagittal and coronal reformations are provided for review.  FINDINGS: There has been interval development an extra-axial fluid collection along the left cerebral convexity, which measures approximately 12 x 3 x 3 cm in anteroposterior (AP), transverse, and craniocaudal extent, respectively.  It has an estimated total volume of 87.5 mL.  It has a CT number of about 15 Hounsfield units or slightly less.  This finding is thought most likely to be a subacute-to-chronic subdural hematoma.  Small acute components associated with the finding cannot be excluded.  There is thickening of the dura associated with the finding, suggesting chronicity.  There is associated deformity of the left cerebral hemisphere with diffuse cerebral sulcal effacement, as well.  There is approximately 4 to 6 mm of rightward subfalcine herniation (or rightward midline shift).  There is slight effacement of the left lateral ventricle.  Early entrapment of the right lateral ventricle cannot be excluded.  No acute intracranial hemorrhage is seen elsewhere.  No  acute infarct is suspected.  No acute skull fracture.  There may be mild-to-moderate chronic small vessel ischemia/infarction.  No significant acute findings are seen with regard to the imaged orbits, paranasal sinuses, or middle ear clefts.  Minimal age-indeterminate mucosal thickening involves the imaged paranasal sinuses.  No air-fluid interfaces are seen within the imaged paranasal sinuses.        The study is abnormal.  There is a large predominantly subacute-to-chronic subdural hematoma overlying the left cerebral convexity with an estimated total volume of 87.5 mL.  There is associated rightward subfalcine herniation, estimated at 4-6 mm.  There is diffuse left cerebral hemispheric sulcal effacement.  There may be early entrapment of the right lateral ventricle.  No acute intracranial hemorrhage is seen elsewhere.  No acute skull fracture.  No acute infarct.   Pertinent findings were phoned to Dr. Nguyen (ordering/attending [facility] ED Physician) at approximately 2250 hours on 6/15/2023.  Please note that portions of this note were completed with a voice recognition program.  MAJOR CARRILLO JR, MD       Electronically Signed and Approved By: MAJOR CARRILLO JR, MD on 6/15/2023 at 23:25                 Differential Diagnosis and Discussion:      Trauma:  Differential diagnosis considered but not limited to were subarachnoid hemorrhage, intracranial bleeding, pneumothorax, cardiac contusion, lung contusion, intra-abdominal bleeding, and compartment syndrome of any extremity or other significant traumatic pathology    All X-rays impressions were independently interpreted by me.  CT scan radiology impression was interpreted by me.    [facility]       Critical Care Note: Total Critical Care time of 35 minutes. Total critical care time documented does not include time spent on separately billed procedures for services of nurses or physician assistants. I personally saw and examined the patient. I have reviewed all diagnostic  interpretations and treatment plans as written. I was present for the key portions of any procedures performed and the inclusive time noted in any critical care statement. Critical care time includes patient management by me, time spent at the patients bedside,  time to review lab and imaging results, discussing patient care, documentation in the medical record, and time spent with family or caregiver.    Patient Care Considerations:          Consultants/Shared Management Plan:    Consultant: I have discussed the case with neurosurgery and intensive care at [facility] who states that they accept the patient in transfer    Social Determinants of Health:    Patient is independent, reliable, and has access to care.       Disposition and Care Coordination:    Transferred: Through independent evaluation of the patient's history, physical, and imperical data, the patient meets criteria to be transferred to another hospital for evaluation/admission.        Final diagnoses:   Subdural hematoma   Closed fracture of distal end of right radius, unspecified fracture morphology, initial encounter   Closed fracture of distal end of right ulna, unspecified fracture morphology, initial encounter   Fall, initial encounter        ED Disposition       ED Disposition   Transfer to Another Facility     Condition   --    Comment   --               This medical record created using voice recognition software.      Documentation assistance provided by Meme Aly acting as scribe for Tayo Nguyen MD. Information recorded by the scribe was done at my direction and has been verified and validated by me.            Meme Aly  06/15/23 2878       Tayo Nguyen MD  06/16/23 9423

## 2023-06-16 NOTE — ANESTHESIA PROCEDURE NOTES
Airway  Urgency: elective    Date/Time: 6/16/2023 11:23 AM  Airway not difficult    General Information and Staff    Patient location during procedure: OR  Anesthesiologist: Brian Tobar MD    Indications and Patient Condition  Indications for airway management: airway protection    Preoxygenated: yes  MILS maintained throughout  Mask difficulty assessment: 0 - not attempted    Final Airway Details  Final airway type: endotracheal airway      Successful airway: ETT  Cuffed: yes   Successful intubation technique: direct laryngoscopy  Endotracheal tube insertion site: oral  Blade: [place]  Blade size: 3  ETT size (mm): 7.0  Cormack-Lehane Classification: grade I - full view of glottis  Placement verified by: chest auscultation and capnometry   Measured from: lips  ETT/EBT  to lips (cm): 21  Number of attempts at approach: 1  Assessment: lips, teeth, and gum same as pre-op and atraumatic intubation

## 2023-06-16 NOTE — ANESTHESIA PREPROCEDURE EVALUATION
Anesthesia Evaluation     Patient summary reviewed and Nursing notes reviewed   no history of anesthetic complications:  NPO Solid Status: > 8 hours  NPO Liquid Status: > 2 hours           Airway   Mallampati: III  TM distance: <3 FB  Neck ROM: full  Possible difficult intubation  Dental    (+) edentulous    Pulmonary - negative pulmonary ROS and normal exam   Cardiovascular - normal exam  Exercise tolerance: good (4-7 METS)    Rhythm: regular    (+) hyperlipidemia,       Neuro/Psych- negative ROS    ROS Comment: Large subdural hematoma with midline shift  GI/Hepatic/Renal/Endo    (+)  GERD,  renal disease stones, diabetes mellitus type 2,     Musculoskeletal (-) negative ROS    Abdominal    Substance History - negative use     OB/GYN negative ob/gyn ROS         Other      history of cancer (h/o Right Breast, lymph nodes resected; no IV Right arm)    ROS/Med Hx Other: Right radial fx from fall                    Anesthesia Plan    ASA 3     general     intravenous induction     Anesthetic plan, risks, benefits, and alternatives have been provided, discussed and informed consent has been obtained with: patient.

## 2023-06-16 NOTE — ANESTHESIA POSTPROCEDURE EVALUATION
"Patient: Rebecca Mchugh    Procedure Summary       Date: 06/16/23 Room / Location: [facility] OR  / [facility] MAIN OR    Anesthesia Start: 1111 Anesthesia Stop: 1259    Procedure: LEFT SIDED MING HOLE (Head) Diagnosis:     Surgeons: Prieto Castillo MD Provider: Brian Tobar MD    Anesthesia Type: general ASA Status: 3            Anesthesia Type: general    Vitals  Vitals Value Taken Time   /58 06/16/23 1445   Temp 36.8 °C (98.3 °F) 06/16/23 1301   Pulse 76 06/16/23 1459   Resp 18 06/16/23 1315   SpO2 93 % 06/16/23 1459   Vitals shown include unvalidated device data.        Post Anesthesia Care and Evaluation    Patient location during evaluation: PACU  Patient participation: complete - patient participated  Level of consciousness: awake and alert  Pain management: adequate    Airway patency: patent  Anesthetic complications: No anesthetic complications  PONV Status: controlled  Cardiovascular status: acceptable and hemodynamically stable  Respiratory status: acceptable  Hydration status: acceptable    Comments: /83   Pulse 78   Temp 36.8 °C (98.3 °F) (Oral)   Resp 18   Ht 162.6 cm (64.02\")   Wt 67.2 kg (148 lb 2.4 oz)   SpO2 94%   BMI 25.42 kg/m²     "

## 2023-06-19 NOTE — PAYOR COMM NOTE
SUBJECT:     AMBULANCE TRANSPORTATION AUTHORIZATION REQUEST    PATIENT'S NAME:     Rebecca LAIRD [facility] MRN:     [id number]     DOS:     6/16/2023    INSURANCE MEMBER ID:     [id number]        SERVICE PROVIDER  Company [facility]  NPI  [id number]   Tax ID  [id number]  Address [address] [place] [address]  Phone  [phone]  Fax  [phone]        CASE MANAGEMENT CONTACT INFORMATIONd  Name  Fabiola Cervantes           Phone  [phone]  Fax  [phone]        DIAGNOSIS AND HOSPITAL PROBLEMS    Problems Addressed this Visit          Other    Subdural hematoma - Primary     Other Visit Diagnoses       Closed fracture of distal end of right radius, unspecified fracture morphology, initial encounter        Closed fracture of distal end of right ulna, unspecified fracture morphology, initial encounter        Fall, initial encounter              Diagnoses         Codes Comments    Subdural hematoma    -  Primary ICD-10-CM: S06.5XAA  ICD-9-CM: 432.1     Closed fracture of distal end of right radius, unspecified fracture morphology, initial encounter     ICD-10-CM: S52.501A  ICD-9-CM: 813.42     Closed fracture of distal end of right ulna, unspecified fracture morphology, initial encounter     ICD-10-CM: S52.601A  ICD-9-CM: 813.43     Fall, initial encounter     ICD-10-CM: W19.XXXA  ICD-9-CM: E888.9              Rebecca Mchugh (73 y.o. Female)       Date of Birth   1950    Social Security Number       Address   [address] [place] [address]    Home Phone   [phone]    MRN   [id number]       [employer]   S[employer]ism    Marital Status                               Admission Date   6/15/23    Admission Type   Emergency    Admitting Provider       Attending Provider       Department, Room/Bed   [facility] EMERGENCY ROOM, 29/29       Discharge Date   6/16/2023    Discharge Disposition   Another Health Care Institution Not Defined     "Discharge Destination                                 Attending Provider: (none)   Allergies: Strawberry    Isolation: None   Infection: None   Code Status: CPR    Ht: 162.6 cm (64\")   Wt: 64 kg (141 lb 1.5 oz)    Admission Cmt: None   Principal Problem: None                  Active Insurance as of 6/15/2023       Primary Coverage       Payor Plan Insurance Group Employer/Plan Group    ANTHEM MEDICARE REPLACEMENT ANTHEM MEDICARE ADVANTAGE [id number]       Payor Plan Address Payor Plan Phone Number Payor Plan Fax Number Effective Dates    [address] [phone]  1/1/2020 - None Entered    [facility] [id number]         Subscriber Name Subscriber Birth Date Member ID       KIERAN GIRALDO 1950 [id number]               Secondary Coverage       Payor Plan Insurance Group Employer/Plan Group    [place]        Payor Plan Address Payor Plan Phone Number Payor Plan Fax Number Effective Dates    [address] [phone]  9/20/2021 - None Entered    [facility] [id number]         Subscriber Name Subscriber Birth Date Member ID       KIERAN GIRALDO 1950 [id number]                     Emergency Contacts        (Rel.) Home Phone Work Phone Mobile Phone    Angelina Penaloza (Grandchild) [phone] -- --                               Emergency Department Notes        Tayo Nguyen MD at 06/15/23 2223          Time: 10:23 PM EDT  Date of encounter:  6/15/2023  Independent Historian/Clinical History and Information was obtained by:   Patient  Chief Complaint   Patient presents with    Arm Injury       History is limited by: N/A    History of Present Illness:  Patient is a 73 y.o. year old female who presents to the emergency department for evaluation of syncopal episode while walking dog just prior to arrival.  Denies any prodromal symptoms including chest pain, palpitations, shortness of breath, dizziness.  Did hit head.  Complaining of pain to right wrist and elbow. (Mary " Commerce, PA-C provider in triage 10:24 PM EDT )     Patient was walking her 5lb chihuahua and tripped over her around 4-5pm. She knows she passed out for a few minutes because she moved off of her right side.Patient thinks she stumbled and then fell and lost consciousness when she hit the ground. Patient has pain in her right wrist and elbow. Patient knew of a subdural hematoma which was potentially reinjured today. Similar situation of tripping over her dog occurred last time she had a hematoma. Patient takes blood thinners for her diabetes. Patient denies nausea,     HPI    Patient Care Team  Primary Care Provider: Colin Jamil MD    Past Medical History:     Allergies   Allergen Reactions    [place] Hives     Past Medical History:   Diagnosis Date    Acid reflux     Ankle fracture 2020    Bladder disorder     Breast cancer     Right    Cancer     Colitis     Diabetes     Diabetes mellitus     GERD (gastroesophageal reflux disease)     History of spinal fracture 11/2020    Hyperlipemia     Recurrent UTI (urinary tract infection)     Renal calculus or stone     Seasonal allergies     Trigger thumb of right hand 6/17/2021    Urinary incontinence in female      Past Surgical History:   Procedure Laterality Date    ABDOMINAL HYSTERECTOMY      BACK SURGERY      HERNIATED DISCS  X3 OR 4 TIMES     BLADDER REPAIR  2002    BREAST BIOPSY      BREAST LUMPECTOMY Right     breast mass    CHOLECYSTECTOMY      COLONOSCOPY      CYSTOSCOPY      with dilation    GALLBLADDER SURGERY      INTERSTIM PLACEMENT  2018-19?    INTRAOCULAR LENS INSERTION      TRIGGER FINGER RELEASE Right 6/17/2021    Procedure: RIGHT FINGER TRIGGER THUMB RELEASE;  Surgeon: Eyad Griffith MD;  Location: [facility];  Service: Orthopedics;  Laterality: Right;    VAGINAL MESH REVISION      vaginal approach per Dr. Liao     Family History   Problem Relation Age of Onset    Cancer Mother         unspecified    Breast cancer Mother         30s     Heart disease Brother     Cancer Brother         unspecified    Diabetes Brother         unspecified type    Malig Hyperthermia Neg Hx        Home Medications:  Prior to Admission medications    Medication Sig Start Date End Date Taking? Authorizing Provider   amitriptyline (ELAVIL) 25 MG tablet Take 25 mg by mouth Every Night.    Ryne Reilly MD   amLODIPine (NORVASC) 10 MG tablet Take 1 tablet by mouth Daily. 2/10/23   Fabrizio Abraham PA   atorvastatin (LIPITOR) 40 MG tablet Take 40 mg by mouth Daily.    Ryne Reilly MD   calcium carbonate (OS-CHAPARRO) 600 MG tablet Take 600 mg by mouth 2 (Two) Times a Day With Meals.    Ryne Reilly MD   carvedilol (COREG) 6.25 MG tablet Take 1 tablet by mouth 2 (Two) Times a Day With Meals. 2/10/23   Fabrizio Abraham PA   cyclobenzaprine (FLEXERIL) 10 MG tablet Take 10 mg by mouth 3 (Three) Times a Day As Needed for Muscle Spasms.    Ryne Reilly MD   Dulaglutide (Trulicity) 0.75 MG/0.5ML solution pen-injector Inject 0.75 mg under the skin into the appropriate area as directed.    Ryne Reilly MD   fesoterodine fumarate (TOVIAZ ER) 8 MG tablet sustained-release 24 hour tablet Take 8 mg by mouth Daily.    Ryne Reilly MD   HYDROcodone-acetaminophen (NORCO) 7.5-325 MG per tablet Take 1 tablet by mouth Every 6 (Six) Hours As Needed for Moderate Pain.    Ryne Reilly MD   levocetirizine (XYZAL) 5 MG tablet Take 5 mg by mouth Every Evening.    Ryne Reilly MD   levothyroxine (SYNTHROID, LEVOTHROID) 25 MCG tablet Take 25 mcg by mouth Every Morning.    Ryne Reilly MD   lidocaine (Lidoderm) 5 % Place 1 patch on the skin as directed by provider Daily. Remove & Discard patch within 12 hours or as directed by MD 5/12/22   Golden Clark APRN   lisinopril (PRINIVIL,ZESTRIL) 10 MG tablet Take 1 tablet by mouth Daily. 2/10/23   Fabrizio Abraham PA   lubiprostone (AMITIZA) 24 MCG capsule Take 24 mcg by mouth 2 (Two)  Times a Day With Meals.    Ryne Reilly MD   metFORMIN ER (GLUCOPHAGE-XR) 500 MG 24 hr tablet Take 500 mg by mouth 2 (Two) Times a Day.    Ryne Reilly MD   multivitamin with minerals tablet tablet Take 1 tablet by mouth Daily.    Ryne Reilly MD   nystatin (MYCOSTATIN) 100,000 unit/mL suspension Swish and swallow 500,000 Units 4 (Four) Times a Day.    Ryne Reilly MD   olopatadine (PATANOL) 0.1 % ophthalmic solution 1 drop 2 (Two) Times a Day.    Ryne Reilly MD   omeprazole (priLOSEC) 40 MG capsule Take 40 mg by mouth Daily.    Ryne Reilly MD   sulfamethoxazole-trimethoprim (BACTRIM DS,SEPTRA DS) 800-160 MG per tablet Take 1 tablet by mouth 2 (Two) Times a Day. 2/6/23   Ryne Reilly MD   traMADol (ULTRAM) 50 MG tablet Take 50 mg by mouth 2 (Two) Times a Day As Needed for Moderate Pain.    Ryne Reilly MD   traZODone (DESYREL) 100 MG tablet Take 100 mg by mouth Every Night.    Ryne Reilly MD        Social History:   Social History     Tobacco Use    Smoking status: Former     Packs/day: 1.00     Types: Cigarettes     Passive exposure: Past    Tobacco comments:     quit smoking at age 48   Vaping Use    Vaping Use: Never used   Substance Use Topics    Alcohol use: Never    Drug use: Never         Review of Systems:  Review of Systems   Constitutional:  Negative for chills and fever.   HENT:  Negative for congestion, ear pain and sore throat.    Eyes:  Negative for pain.   Respiratory:  Negative for cough, chest tightness and shortness of breath.    Cardiovascular:  Negative for chest pain.   Gastrointestinal:  Negative for abdominal pain, diarrhea, nausea and vomiting.   Genitourinary:  Negative for flank pain and hematuria.   Musculoskeletal:  Positive for arthralgias (Right wrist and elbow) and joint swelling (right wrist and elbow).   Skin:  Negative for pallor.   Neurological:  Positive for syncope. Negative for seizures and  "headaches.   All other systems reviewed and are negative.     Physical Exam:  /56   Pulse 81   Temp 97.8 °F (36.6 °C)   Resp 18   Ht 162.6 cm (64\")   Wt 64 kg (141 lb 1.5 oz)   SpO2 95%   BMI 24.22 kg/m²     Physical Exam  Vitals and nursing note reviewed.   Constitutional:       General: She is not in acute distress.     Appearance: Normal appearance. She is not toxic-appearing.   HENT:      Head: Normocephalic and atraumatic.      Mouth/Throat:      Mouth: Mucous membranes are moist.   Eyes:      General: No scleral icterus.  Cardiovascular:      Rate and Rhythm: Normal rate and regular rhythm.      Pulses: Normal pulses.      Heart sounds: Normal heart sounds.   Pulmonary:      Effort: Pulmonary effort is normal. No respiratory distress.      Breath sounds: Normal breath sounds.   Abdominal:      General: Abdomen is flat.      Palpations: Abdomen is soft.      Tenderness: There is no abdominal tenderness.   Musculoskeletal:         General: Swelling and tenderness present.      Right wrist: Tenderness present. Decreased range of motion.      Cervical back: Normal range of motion and neck supple.      Comments: Nerves intact in right wrist   Skin:     General: Skin is warm and dry.   Neurological:      Mental Status: She is alert and oriented to person, place, and time. Mental status is at baseline.      Comments: NIH stroke scale of 0  GCS 15                Procedures:  Procedures      Medical Decision Making:      Comorbidities that affect care:    Hyperlipidemia, GERD, Bladder disorder, Ankle fracture, Trigger thumb of right hand, Colitis, Hyperlipidemia, , Cancer, Diabetes    External Notes reviewed:          The following orders were placed and all results were independently analyzed by me:  Orders Placed This Encounter   Procedures    XR Wrist 3+ View Right    XR Elbow 3+ View Right    CT Head Without Contrast    [place] Draw    Comprehensive Metabolic Panel    Magnesium    Single High " Sensitivity Troponin T    CBC Auto Differential    Continuous Pulse Oximetry    Vital Signs    Obtain & Apply The Following- Upper extremity; Sling    POC Glucose Once    ECG 12 Lead ED Triage Standing Order; Syncope    CBC & Differential    Green Top (Gel)    Lavender Top    Gold Top - SST    Light Blue Top       Medications Given in the Emergency Department:  Medications   morphine injection 2 mg (2 mg Intravenous Given 6/16/23 0101)        ED Course:    The patient was initially evaluated in the triage area where orders were placed. The patient was later dispositioned by Tayo Nguyen MD.      The patient was advised to stay for completion of workup which includes but is not limited to communication of labs and radiological results, reassessment and plan. The patient was advised that leaving prior to disposition by a provider could result in critical findings that are not communicated to the patient.     ED Course as of 06/16/23 0829   Thu Blu 15, 2023   0755 I was alerted by the radiologist Dr. Hodges of the patient's acute on chronic subdural hematoma with evidence of mass effect. [JS]   1744 On evaluation the patient she appears awake alert appropriate with no evidence of neurologic deficit on exam.  She is complaining of right wrist pain.  She appears to have distal radial and ulnar nondisplaced fractures which will be placed in sugar-tong splint. [JS]   Fri Jun 16, 2023   3257 I discussed patient's presentation and work-up with neurosurgery at [facility] states patient has no emergent surgical intervention and requests that I transfer the patient to another institution due to their current bed status. [JS]   6638 I discussed patient with Dr. Heart of neurosurgery at [facility] who recommends transfer to their hospital.  I discussed patient with  of the intensive care unit who accepts patient in transfer.  Patient's vital signs and respiratory status are safe for  transport at this time. [JS]      ED Course User Index  [JS] Tayo Nguyen MD       Labs:    Lab Results (last 24 hours)       Procedure Component Value Units Date/Time    CBC & Differential [[id number]]  (Abnormal) Collected: 06/15/23 2224    Specimen: Blood from Arm, Right Updated: 06/15/23 2245    Narrative:      The following orders were created for panel order CBC & Differential.  Procedure                               Abnormality         Status                     ---------                               -----------         ------                     CBC Auto Differential[[id number]]        Abnormal            Final result                 Please view results for these tests on the individual orders.    Comprehensive Metabolic Panel [[id number]]  (Abnormal) Collected: 06/15/23 2224    Specimen: Blood from Arm, Right Updated: 06/15/23 2309     Glucose 159 mg/dL      BUN 17 mg/dL      Creatinine 1.19 mg/dL      Sodium 132 mmol/L      Potassium 4.8 mmol/L      Chloride 96 mmol/L      CO2 24.2 mmol/L      Calcium 8.9 mg/dL      Total Protein 6.5 g/dL      Albumin 3.5 g/dL      ALT (SGPT) 18 U/L      AST (SGOT) 16 U/L      Alkaline Phosphatase 147 U/L      Total Bilirubin 0.5 mg/dL      Globulin 3.0 gm/dL      A/G Ratio 1.2 g/dL      BUN/Creatinine Ratio 14.3     Anion Gap 11.8 mmol/L      eGFR 47.8 mL/min/1.73     Narrative:      GFR Normal >60  Chronic Kidney Disease <60  Kidney Failure <15    The GFR formula is only valid for adults with stable renal function between ages 18 and 70.    Magnesium [[id number]]  (Normal) Collected: 06/15/23 2224    Specimen: Blood from Arm, Right Updated: 06/15/23 2309     Magnesium 1.6 mg/dL     Single High Sensitivity Troponin T [[id number]]  (Normal) Collected: 06/15/23 2224    Specimen: Blood from Arm, Right Updated: 06/15/23 2315     HS Troponin T <6 ng/L     Narrative:      High Sensitive Troponin T Reference Range:  <10.0 ng/L- Negative Female for AMI  <15.0 ng/L- Negative Male  for AMI  >=10 - Abnormal Female indicating possible myocardial injury.  >=15 - Abnormal Male indicating possible myocardial injury.   Clinicians would have to utilize clinical acumen, EKG, Troponin, and serial changes to determine if it is an Acute Myocardial Infarction or myocardial injury due to an underlying chronic condition.         CBC Auto Differential [[id number]]  (Abnormal) Collected: 06/15/23 2224    Specimen: Blood from Arm, Right Updated: 06/15/23 2245     WBC 10.10 10*3/mm3      RBC 3.71 10*6/mm3      Hemoglobin 10.6 g/dL      Hematocrit 31.9 %      MCV 86.0 fL      MCH 28.6 pg      MCHC 33.2 g/dL      RDW 14.1 %      RDW-SD 44.5 fl      MPV 8.9 fL      Platelets 338 10*3/mm3      Neutrophil % 71.3 %      Lymphocyte % 13.8 %      Monocyte % 10.0 %      Eosinophil % 3.8 %      Basophil % 0.5 %      Immature Grans % 0.6 %      Neutrophils, Absolute 7.21 10*3/mm3      Lymphocytes, Absolute 1.39 10*3/mm3      Monocytes, Absolute 1.01 10*3/mm3      Eosinophils, Absolute 0.38 10*3/mm3      Basophils, Absolute 0.05 10*3/mm3      Immature Grans, Absolute 0.06 10*3/mm3      nRBC 0.0 /100 WBC     POC Glucose Once [[id number]]  (Abnormal) Collected: 06/16/23 0005    Specimen: Blood Updated: 06/16/23 0007     Glucose 141 mg/dL      Comment: Serial Number: [id number]:  [id number]       POC Glucose Once [[id number]]  (Normal) Collected: 06/16/23 0528    Specimen: Blood Updated: 06/16/23 0531     Glucose 114 mg/dL      Comment: Meter: [id number] : [id number] Macy Kellogg RN                Imaging:    XR Elbow 3+ View Right    Result Date: 6/15/2023  PROCEDURE: XR ELBOW 3+ VW RIGHT  COMPARISON: None.  INDICATIONS: h/o fall; trauma/injury involving the right elbow; pain in right elbow  FINDINGS: Three views were obtained.  There is a large right elbow joint effusion.  There may be a subtle acute nondisplaced Uriah type 1 probably extra-articular closed fracture of the proximal right radius, centered at its  head-neck junction.  Minimal, if any, angulation deformity is associated with the large distal fracture fragment.  No other acute fractures are seen.  No dislocation.  Mild degenerative and enthesopathic changes involve the right elbow.  Acute soft tissue contusion is seen posteriorly.       There is a possible acute nondisplaced proximal right radial fracture, as with a Uriah type 1 fracture.  No other acute fractures are seen.  No dislocation.  A large right elbow joint effusion is seen.     Please note that portions of this note were completed with a voice recognition program.  MAJOR CARRILLO JR, MD       Electronically Signed and Approved By: MAJOR CARRILLO JR, MD on 6/15/2023 at 23:27              XR Wrist 3+ View Right    Result Date: 6/16/2023  PROCEDURE: XR WRIST 3+ VW RIGHT  COMPARISON: None.  INDICATIONS: fall; pain in right wrist  FINDINGS: Three views of the right wrist reveal acute comminuted nondisplaced intra-articular closed fractures of the distal right radius.  These fractures are probably a Frykman type 4 fracture.  There is also an acute nondisplaced fracture of the ulnar styloid process, which is closed and extra-articular.  It is transversely oriented.  Acute soft tissue contusions involve the distal right forearm and the right wrist.  There is generalized osteopenia.  No other acute fractures are seen.  No dislocation.  No retained radiopaque foreign body or subcutaneous emphysema.       There are acute nondisplaced fractures of the distal right radius and ulna, as discussed.  No dislocation is seen.     Please note that portions of this note were completed with a voice recognition program.  MAJOR CARRILLO JR, MD       Electronically Signed and Approved By: MAJOR CARRILLO JR, MD on 6/16/2023 at 0:23              CT Head Without Contrast    Result Date: 6/15/2023  PROCEDURE: CT HEAD WO CONTRAST  COMPARISONS: 2/24/2023; 2/8/2023; 2/7/2023.  INDICATIONS: FALL; HIT TOP OF HEAD; NO LOSS OF  CONSCIOUSNESS; NO OTHER HEAD COMPLAINTS.  PROTOCOL:   Standard CT imaging protocol performed.    RADIATION:   Total DLP: 954.7 mGy*cm.   MA and/or KV were/was adjusted to minimize radiation dose.    TECHNIQUE: After obtaining the patient's consent, 295 CT images were obtained without non-ionic intravenous contrast material.  Two-dimensional sagittal and coronal reformations are provided for review.  FINDINGS: There has been interval development an extra-axial fluid collection along the left cerebral convexity, which measures approximately 12 x 3 x 3 cm in anteroposterior (AP), transverse, and craniocaudal extent, respectively.  It has an estimated total volume of 87.5 mL.  It has a CT number of about 15 Hounsfield units or slightly less.  This finding is thought most likely to be a subacute-to-chronic subdural hematoma.  Small acute components associated with the finding cannot be excluded.  There is thickening of the dura associated with the finding, suggesting chronicity.  There is associated deformity of the left cerebral hemisphere with diffuse cerebral sulcal effacement, as well.  There is approximately 4 to 6 mm of rightward subfalcine herniation (or rightward midline shift).  There is slight effacement of the left lateral ventricle.  Early entrapment of the right lateral ventricle cannot be excluded.  No acute intracranial hemorrhage is seen elsewhere.  No acute infarct is suspected.  No acute skull fracture.  There may be mild-to-moderate chronic small vessel ischemia/infarction.  No significant acute findings are seen with regard to the imaged orbits, paranasal sinuses, or middle ear clefts.  Minimal age-indeterminate mucosal thickening involves the imaged paranasal sinuses.  No air-fluid interfaces are seen within the imaged paranasal sinuses.        The study is abnormal.  There is a large predominantly subacute-to-chronic subdural hematoma overlying the left cerebral convexity with an estimated total  volume of 87.5 mL.  There is associated rightward subfalcine herniation, estimated at 4-6 mm.  There is diffuse left cerebral hemispheric sulcal effacement.  There may be early entrapment of the right lateral ventricle.  No acute intracranial hemorrhage is seen elsewhere.  No acute skull fracture.  No acute infarct.   Pertinent findings were phoned to Dr. Nguyen (ordering/attending [facility] ED Physician) at approximately 2250 hours on 6/15/2023.  Please note that portions of this note were completed with a voice recognition program.  MAJOR CARRILLO JR, MD       Electronically Signed and Approved By: MAJOR CARRILLO JR, MD on 6/15/2023 at 23:25                 Differential Diagnosis and Discussion:      Trauma:  Differential diagnosis considered but not limited to were subarachnoid hemorrhage, intracranial bleeding, pneumothorax, cardiac contusion, lung contusion, intra-abdominal bleeding, and compartment syndrome of any extremity or other significant traumatic pathology    All X-rays impressions were independently interpreted by me.  CT scan radiology impression was interpreted by me.    MDM       Critical Care Note: Total Critical Care time of 35 minutes. Total critical care time documented does not include time spent on separately billed procedures for services of nurses or physician assistants. I personally saw and examined the patient. I have reviewed all diagnostic interpretations and treatment plans as written. I was present for the key portions of any procedures performed and the inclusive time noted in any critical care statement. Critical care time includes patient management by me, time spent at the patients bedside,  time to review lab and imaging results, discussing patient care, documentation in the medical record, and time spent with family or caregiver.    Patient Care Considerations:          Consultants/Shared Management Plan:    Consultant: I have discussed the case with neurosurgery and intensive care at  [facility] who states that they accept the patient in transfer    Social Determinants of Health:    Patient is independent, reliable, and has access to care.       Disposition and Care Coordination:    Transferred: Through independent evaluation of the patient's history, physical, and imperical data, the patient meets criteria to be transferred to another hospital for evaluation/admission.        Final diagnoses:   Subdural hematoma   Closed fracture of distal end of right radius, unspecified fracture morphology, initial encounter   Closed fracture of distal end of right ulna, unspecified fracture morphology, initial encounter   Fall, initial encounter        ED Disposition       ED Disposition   Transfer to Another Facility     Condition   --    Comment   --               This medical record created using voice recognition software.      Documentation assistance provided by Meme Aly acting as scribe for Tayo Nguyen MD. Information recorded by the scribe was done at my direction and has been verified and validated by me.            Meme Aly  06/15/23 0683       Tayo Nguyen MD  06/16/23 0829      Electronically signed by Tayo Nguyen MD at 06/16/23 0829         Physician Certification:    Reason for Transfer: Unable to provide services necessary for care (List in Comment)  Comment: Neurosurgical care       Potential risk of transfer: Displacement of IV or other tubes en route, Transportation Risk, Deterioration of condition/Death   Potential benefits of transfer: Availability of resources   Risks/benefits explained to: Patient     Receiving facility notified and accepted patient, indicating capability and capacity to treat.    Accepting physician: Dr. Mera    Accepted date/time: 6/16/2023  1:47 AM   Transferring physician: Tayo Nguyen MD     Patient condition: The patient has been stabilized such that within reasonable medical probability, no deterioration of the patient's  condition is likely to result from the transfer.    Physician certification: After examination of this patient, and based on information available at this time, the medical benefits reasonable expected from provision of appropriate treatment at the receiving facility, outweigh the increased risk, if any, to the individual's medical condition and, in the case of labor, to the unborn child's medical condition from the effecting transfer.       Nursing Documentation:    Accepting hospital: [facility]      Report given to: KATARZYNA Kellogg Report time: 6/16/2023  3:45 AM  Medications reviewed with next service provider: Yes     Copies of medical records sent: History and Physical, Med Rec form, Provider note, Nursing note, Transfer form, Orders, X-ray/diagnostic procedures   Belongings disposition: Sent with patient     Transport via: Ambulance   -  Service: HEMS   -  Level of Care: Advanced Life Support (ALS)     Condition at transfer: Stable

## 2023-06-20 PROBLEM — I10 HTN (HYPERTENSION): Status: ACTIVE | Noted: 2023-06-20

## 2023-06-20 PROBLEM — E11.9 DM (DIABETES MELLITUS), TYPE 2: Status: ACTIVE | Noted: 2023-06-20

## 2023-06-20 PROBLEM — S52.501A DISTAL RADIUS FRACTURE, RIGHT: Status: ACTIVE | Noted: 2023-06-20

## 2023-06-20 PROBLEM — S52.601A RIGHT DISTAL ULNAR FRACTURE: Status: ACTIVE | Noted: 2023-06-20

## 2023-06-20 PROBLEM — E03.9 HYPOTHYROIDISM: Status: ACTIVE | Noted: 2023-06-20

## 2023-06-28 PROBLEM — E78.2 MIXED HYPERLIPIDEMIA: Status: ACTIVE | Noted: 2023-06-28
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
## 2023-06-28 PROBLEM — R56.1 SEIZURE AFTER HEAD INJURY: Status: ACTIVE | Noted: 2023-06-28

## 2023-07-21 PROBLEM — M79.89 LEFT LEG SWELLING: Status: ACTIVE | Noted: 2023-07-21

## 2023-07-21 PROBLEM — Z97.8 FOLEY CATHETER PRESENT: Status: ACTIVE | Noted: 2023-07-21

## 2023-07-21 PROBLEM — K59.09 OTHER CONSTIPATION: Status: ACTIVE | Noted: 2023-07-21

## 2023-07-22 ENCOUNTER — HOSPITAL ENCOUNTER (INPATIENT)
Facility: HOSPITAL | Age: 73
LOS: 2 days | Discharge: NURSING, REHAB OR LONG-TERM CARE | DRG: 812 | End: 2023-07-26
Attending: EMERGENCY MEDICINE | Admitting: INTERNAL MEDICINE
Payer: MEDICARE

## 2023-07-22 DIAGNOSIS — R26.2 DIFFICULTY IN WALKING: ICD-10-CM

## 2023-07-22 DIAGNOSIS — D64.9 ACUTE ON CHRONIC ANEMIA: ICD-10-CM

## 2023-07-22 DIAGNOSIS — Z78.9 DECREASED ACTIVITIES OF DAILY LIVING (ADL): ICD-10-CM

## 2023-07-22 DIAGNOSIS — R19.5 OCCULT BLOOD POSITIVE STOOL: ICD-10-CM

## 2023-07-22 DIAGNOSIS — D64.9 SEVERE ANEMIA: Primary | ICD-10-CM

## 2023-07-22 LAB
ABO GROUP BLD: NORMAL
ABO GROUP BLD: NORMAL
ALBUMIN SERPL-MCNC: 2.9 G/DL (ref 3.5–5.2)
ALBUMIN/GLOB SERPL: 1.1 G/DL
ALP SERPL-CCNC: 117 U/L (ref 39–117)
ALT SERPL W P-5'-P-CCNC: 10 U/L (ref 1–33)
ANION GAP SERPL CALCULATED.3IONS-SCNC: 9.2 MMOL/L (ref 5–15)
APTT PPP: 32.7 SECONDS (ref 78–95.9)
AST SERPL-CCNC: 9 U/L (ref 1–32)
BASOPHILS # BLD AUTO: 0.02 10*3/MM3 (ref 0–0.2)
BASOPHILS NFR BLD AUTO: 0.4 % (ref 0–1.5)
BILIRUB SERPL-MCNC: 0.8 MG/DL (ref 0–1.2)
BLD GP AB SCN SERPL QL: NEGATIVE
BUN SERPL-MCNC: 15 MG/DL (ref 8–23)
BUN/CREAT SERPL: 14.3 (ref 7–25)
CALCIUM SPEC-SCNC: 8.7 MG/DL (ref 8.6–10.5)
CHLORIDE SERPL-SCNC: 101 MMOL/L (ref 98–107)
CO2 SERPL-SCNC: 22.8 MMOL/L (ref 22–29)
CREAT SERPL-MCNC: 1.05 MG/DL (ref 0.57–1)
DEPRECATED RDW RBC AUTO: 43.9 FL (ref 37–54)
EGFRCR SERPLBLD CKD-EPI 2021: 55.5 ML/MIN/1.73
EOSINOPHIL # BLD AUTO: 0.13 10*3/MM3 (ref 0–0.4)
EOSINOPHIL NFR BLD AUTO: 2.7 % (ref 0.3–6.2)
ERYTHROCYTE [DISTWIDTH] IN BLOOD BY AUTOMATED COUNT: 14.6 % (ref 12.3–15.4)
FERRITIN SERPL-MCNC: 609 NG/ML (ref 13–150)
GLOBULIN UR ELPH-MCNC: 2.7 GM/DL
GLUCOSE BLDC GLUCOMTR-MCNC: 160 MG/DL (ref 70–99)
GLUCOSE BLDC GLUCOMTR-MCNC: 88 MG/DL (ref 70–99)
GLUCOSE SERPL-MCNC: 126 MG/DL (ref 65–99)
HCT VFR BLD AUTO: 20.9 % (ref 34–46.6)
HEMOCCULT STL QL IA: POSITIVE
HGB BLD-MCNC: 6.8 G/DL (ref 12–15.9)
IMM GRANULOCYTES # BLD AUTO: 0.02 10*3/MM3 (ref 0–0.05)
IMM GRANULOCYTES NFR BLD AUTO: 0.4 % (ref 0–0.5)
INR PPP: 1.21 (ref 0.86–1.15)
IRON 24H UR-MRATE: 18 MCG/DL (ref 37–145)
IRON SATN MFR SERPL: 8 % (ref 20–50)
LYMPHOCYTES # BLD AUTO: 0.97 10*3/MM3 (ref 0.7–3.1)
LYMPHOCYTES NFR BLD AUTO: 20.4 % (ref 19.6–45.3)
MCH RBC QN AUTO: 27.4 PG (ref 26.6–33)
MCHC RBC AUTO-ENTMCNC: 32.5 G/DL (ref 31.5–35.7)
MCV RBC AUTO: 84.3 FL (ref 79–97)
MONOCYTES # BLD AUTO: 0.45 10*3/MM3 (ref 0.1–0.9)
MONOCYTES NFR BLD AUTO: 9.5 % (ref 5–12)
NEUTROPHILS NFR BLD AUTO: 3.17 10*3/MM3 (ref 1.7–7)
NEUTROPHILS NFR BLD AUTO: 66.6 % (ref 42.7–76)
NRBC BLD AUTO-RTO: 0 /100 WBC (ref 0–0.2)
PLATELET # BLD AUTO: 250 10*3/MM3 (ref 140–450)
PMV BLD AUTO: 9 FL (ref 6–12)
POTASSIUM SERPL-SCNC: 3.9 MMOL/L (ref 3.5–5.2)
PROT SERPL-MCNC: 5.6 G/DL (ref 6–8.5)
PROTHROMBIN TIME: 15.4 SECONDS (ref 11.8–14.9)
RBC # BLD AUTO: 2.48 10*6/MM3 (ref 3.77–5.28)
RH BLD: POSITIVE
RH BLD: POSITIVE
SODIUM SERPL-SCNC: 133 MMOL/L (ref 136–145)
T&S EXPIRATION DATE: NORMAL
TIBC SERPL-MCNC: 231 MCG/DL (ref 298–536)
TRANSFERRIN SERPL-MCNC: 155 MG/DL (ref 200–360)
WBC NRBC COR # BLD: 4.76 10*3/MM3 (ref 3.4–10.8)

## 2023-07-22 PROCEDURE — 99223 1ST HOSP IP/OBS HIGH 75: CPT | Performed by: INTERNAL MEDICINE

## 2023-07-22 PROCEDURE — G0378 HOSPITAL OBSERVATION PER HR: HCPCS

## 2023-07-22 PROCEDURE — 86900 BLOOD TYPING SEROLOGIC ABO: CPT

## 2023-07-22 PROCEDURE — 86900 BLOOD TYPING SEROLOGIC ABO: CPT | Performed by: EMERGENCY MEDICINE

## 2023-07-22 PROCEDURE — 83540 ASSAY OF IRON: CPT | Performed by: INTERNAL MEDICINE

## 2023-07-22 PROCEDURE — 86923 COMPATIBILITY TEST ELECTRIC: CPT

## 2023-07-22 PROCEDURE — 85610 PROTHROMBIN TIME: CPT | Performed by: EMERGENCY MEDICINE

## 2023-07-22 PROCEDURE — 63710000001 INSULIN LISPRO (HUMAN) PER 5 UNITS: Performed by: INTERNAL MEDICINE

## 2023-07-22 PROCEDURE — 82274 ASSAY TEST FOR BLOOD FECAL: CPT | Performed by: EMERGENCY MEDICINE

## 2023-07-22 PROCEDURE — 85025 COMPLETE CBC W/AUTO DIFF WBC: CPT

## 2023-07-22 PROCEDURE — 86901 BLOOD TYPING SEROLOGIC RH(D): CPT

## 2023-07-22 PROCEDURE — 99204 OFFICE O/P NEW MOD 45 MIN: CPT | Performed by: INTERNAL MEDICINE

## 2023-07-22 PROCEDURE — 82948 REAGENT STRIP/BLOOD GLUCOSE: CPT

## 2023-07-22 PROCEDURE — 82728 ASSAY OF FERRITIN: CPT | Performed by: INTERNAL MEDICINE

## 2023-07-22 PROCEDURE — P9016 RBC LEUKOCYTES REDUCED: HCPCS

## 2023-07-22 PROCEDURE — 36430 TRANSFUSION BLD/BLD COMPNT: CPT

## 2023-07-22 PROCEDURE — 86850 RBC ANTIBODY SCREEN: CPT | Performed by: EMERGENCY MEDICINE

## 2023-07-22 PROCEDURE — 84466 ASSAY OF TRANSFERRIN: CPT | Performed by: INTERNAL MEDICINE

## 2023-07-22 PROCEDURE — 99285 EMERGENCY DEPT VISIT HI MDM: CPT

## 2023-07-22 PROCEDURE — 85730 THROMBOPLASTIN TIME PARTIAL: CPT | Performed by: EMERGENCY MEDICINE

## 2023-07-22 PROCEDURE — 80053 COMPREHEN METABOLIC PANEL: CPT

## 2023-07-22 PROCEDURE — 86901 BLOOD TYPING SEROLOGIC RH(D): CPT | Performed by: EMERGENCY MEDICINE

## 2023-07-22 RX ORDER — PANTOPRAZOLE SODIUM 40 MG/10ML
40 INJECTION, POWDER, LYOPHILIZED, FOR SOLUTION INTRAVENOUS
Status: DISCONTINUED | OUTPATIENT
Start: 2023-07-23 | End: 2023-07-22

## 2023-07-22 RX ORDER — PANTOPRAZOLE SODIUM 40 MG/10ML
40 INJECTION, POWDER, LYOPHILIZED, FOR SOLUTION INTRAVENOUS EVERY 12 HOURS SCHEDULED
Status: DISCONTINUED | OUTPATIENT
Start: 2023-07-22 | End: 2023-07-24

## 2023-07-22 RX ORDER — HYDROCODONE BITARTRATE AND ACETAMINOPHEN 7.5; 325 MG/1; MG/1
1 TABLET ORAL EVERY 6 HOURS PRN
Status: DISCONTINUED | OUTPATIENT
Start: 2023-07-22 | End: 2023-07-26 | Stop reason: HOSPADM

## 2023-07-22 RX ORDER — MORPHINE SULFATE 2 MG/ML
1 INJECTION, SOLUTION INTRAMUSCULAR; INTRAVENOUS EVERY 4 HOURS PRN
Status: DISCONTINUED | OUTPATIENT
Start: 2023-07-22 | End: 2023-07-23

## 2023-07-22 RX ORDER — ONDANSETRON 2 MG/ML
4 INJECTION INTRAMUSCULAR; INTRAVENOUS EVERY 6 HOURS PRN
Status: DISCONTINUED | OUTPATIENT
Start: 2023-07-22 | End: 2023-07-26 | Stop reason: HOSPADM

## 2023-07-22 RX ORDER — SODIUM CHLORIDE 9 MG/ML
100 INJECTION, SOLUTION INTRAVENOUS CONTINUOUS
Status: DISCONTINUED | OUTPATIENT
Start: 2023-07-22 | End: 2023-07-23

## 2023-07-22 RX ORDER — CYCLOBENZAPRINE HCL 10 MG
10 TABLET ORAL 3 TIMES DAILY PRN
Status: DISCONTINUED | OUTPATIENT
Start: 2023-07-22 | End: 2023-07-26 | Stop reason: HOSPADM

## 2023-07-22 RX ORDER — NALOXONE HCL 0.4 MG/ML
0.4 VIAL (ML) INJECTION
Status: DISCONTINUED | OUTPATIENT
Start: 2023-07-22 | End: 2023-07-23

## 2023-07-22 RX ORDER — SODIUM CHLORIDE 0.9 % (FLUSH) 0.9 %
10 SYRINGE (ML) INJECTION AS NEEDED
Status: DISCONTINUED | OUTPATIENT
Start: 2023-07-22 | End: 2023-07-26 | Stop reason: HOSPADM

## 2023-07-22 RX ORDER — AMITRIPTYLINE HYDROCHLORIDE 25 MG/1
25 TABLET, FILM COATED ORAL NIGHTLY
Status: DISCONTINUED | OUTPATIENT
Start: 2023-07-22 | End: 2023-07-26 | Stop reason: HOSPADM

## 2023-07-22 RX ORDER — INSULIN LISPRO 100 [IU]/ML
2-7 INJECTION, SOLUTION INTRAVENOUS; SUBCUTANEOUS
Status: DISCONTINUED | OUTPATIENT
Start: 2023-07-22 | End: 2023-07-26 | Stop reason: HOSPADM

## 2023-07-22 RX ORDER — TRAZODONE HYDROCHLORIDE 100 MG/1
100 TABLET ORAL NIGHTLY
Status: DISCONTINUED | OUTPATIENT
Start: 2023-07-22 | End: 2023-07-26 | Stop reason: HOSPADM

## 2023-07-22 RX ORDER — NITROGLYCERIN 0.4 MG/1
0.4 TABLET SUBLINGUAL
Status: DISCONTINUED | OUTPATIENT
Start: 2023-07-22 | End: 2023-07-26 | Stop reason: HOSPADM

## 2023-07-22 RX ORDER — NICOTINE POLACRILEX 4 MG
15 LOZENGE BUCCAL
Status: DISCONTINUED | OUTPATIENT
Start: 2023-07-22 | End: 2023-07-26 | Stop reason: HOSPADM

## 2023-07-22 RX ORDER — PANTOPRAZOLE SODIUM 40 MG/10ML
40 INJECTION, POWDER, LYOPHILIZED, FOR SOLUTION INTRAVENOUS ONCE
Status: COMPLETED | OUTPATIENT
Start: 2023-07-22 | End: 2023-07-22

## 2023-07-22 RX ORDER — LEVETIRACETAM 500 MG/1
1000 TABLET ORAL EVERY 12 HOURS SCHEDULED
Status: DISCONTINUED | OUTPATIENT
Start: 2023-07-22 | End: 2023-07-26 | Stop reason: HOSPADM

## 2023-07-22 RX ORDER — LEVOTHYROXINE SODIUM 0.03 MG/1
25 TABLET ORAL
Status: DISCONTINUED | OUTPATIENT
Start: 2023-07-23 | End: 2023-07-26 | Stop reason: HOSPADM

## 2023-07-22 RX ORDER — SODIUM CHLORIDE 9 MG/ML
40 INJECTION, SOLUTION INTRAVENOUS AS NEEDED
Status: DISCONTINUED | OUTPATIENT
Start: 2023-07-22 | End: 2023-07-26 | Stop reason: HOSPADM

## 2023-07-22 RX ORDER — NALOXONE HCL 0.4 MG/ML
0.4 VIAL (ML) INJECTION
Status: DISCONTINUED | OUTPATIENT
Start: 2023-07-22 | End: 2023-07-26 | Stop reason: HOSPADM

## 2023-07-22 RX ORDER — MORPHINE SULFATE 2 MG/ML
1 INJECTION, SOLUTION INTRAMUSCULAR; INTRAVENOUS EVERY 4 HOURS PRN
Status: DISCONTINUED | OUTPATIENT
Start: 2023-07-22 | End: 2023-07-26 | Stop reason: HOSPADM

## 2023-07-22 RX ORDER — SODIUM CHLORIDE 0.9 % (FLUSH) 0.9 %
10 SYRINGE (ML) INJECTION EVERY 12 HOURS SCHEDULED
Status: DISCONTINUED | OUTPATIENT
Start: 2023-07-22 | End: 2023-07-26 | Stop reason: HOSPADM

## 2023-07-22 RX ORDER — BISACODYL 5 MG/1
5 TABLET, DELAYED RELEASE ORAL DAILY PRN
Status: DISCONTINUED | OUTPATIENT
Start: 2023-07-22 | End: 2023-07-26 | Stop reason: HOSPADM

## 2023-07-22 RX ORDER — POLYETHYLENE GLYCOL 3350 17 G/17G
17 POWDER, FOR SOLUTION ORAL DAILY PRN
Status: DISCONTINUED | OUTPATIENT
Start: 2023-07-22 | End: 2023-07-26 | Stop reason: HOSPADM

## 2023-07-22 RX ORDER — DEXTROSE MONOHYDRATE 25 G/50ML
25 INJECTION, SOLUTION INTRAVENOUS
Status: DISCONTINUED | OUTPATIENT
Start: 2023-07-22 | End: 2023-07-26 | Stop reason: HOSPADM

## 2023-07-22 RX ORDER — AMOXICILLIN 250 MG
2 CAPSULE ORAL 2 TIMES DAILY
Status: DISCONTINUED | OUTPATIENT
Start: 2023-07-22 | End: 2023-07-26 | Stop reason: HOSPADM

## 2023-07-22 RX ORDER — CHOLECALCIFEROL (VITAMIN D3) 125 MCG
5 CAPSULE ORAL NIGHTLY PRN
Status: DISCONTINUED | OUTPATIENT
Start: 2023-07-22 | End: 2023-07-26 | Stop reason: HOSPADM

## 2023-07-22 RX ADMIN — LEVETIRACETAM 1000 MG: 500 TABLET, FILM COATED ORAL at 21:42

## 2023-07-22 RX ADMIN — PANTOPRAZOLE SODIUM 40 MG: 40 INJECTION, POWDER, FOR SOLUTION INTRAVENOUS at 21:43

## 2023-07-22 RX ADMIN — INSULIN LISPRO 2 UNITS: 100 INJECTION, SOLUTION INTRAVENOUS; SUBCUTANEOUS at 21:51

## 2023-07-22 RX ADMIN — Medication 10 ML: at 21:43

## 2023-07-22 RX ADMIN — AMITRIPTYLINE HYDROCHLORIDE 25 MG: 25 TABLET, FILM COATED ORAL at 21:43

## 2023-07-22 RX ADMIN — PANTOPRAZOLE SODIUM 40 MG: 40 INJECTION, POWDER, FOR SOLUTION INTRAVENOUS at 15:59

## 2023-07-22 RX ADMIN — TRAZODONE HYDROCHLORIDE 100 MG: 100 TABLET ORAL at 21:43

## 2023-07-22 RX ADMIN — SODIUM CHLORIDE 100 ML/HR: 9 INJECTION, SOLUTION INTRAVENOUS at 18:44

## 2023-07-22 NOTE — ED PROVIDER NOTES
Time: 12:18 PM EDT  Date of encounter:  7/22/2023  Independent Historian/Clinical History and Information was obtained by:   Patient  Chief Complaint: Abnormal labs    History is limited by: N/A    History of Present Illness:  Patient is a 73 y.o. year old female who presents to the emergency department for evaluation of abnormal labs.  Patient had blood drawn this morning at her long-term care facility.  Patient was found to be anemic at 6.6.  Patient was just diagnosed with a DVT and was started on Eliquis.  Patient does not know why she would be anemic.  Patient denies any blood in her stools.  She denies any blood in her urine.  Patient was sent here for further evaluation and treatment.    HPI    Patient Care Team  Primary Care Provider: Colin Jamil MD    Past Medical History:     Allergies   Allergen Reactions    [place] Hives     Past Medical History:   Diagnosis Date    Acid reflux     Ankle fracture 2020    Bladder disorder     Breast cancer     Right    Cancer     Colitis     Diabetes     Diabetes mellitus     DVT (deep venous thrombosis)     GERD (gastroesophageal reflux disease)     History of spinal fracture 11/2020    Hyperlipemia     Recurrent UTI (urinary tract infection)     Renal calculus or stone     Seasonal allergies     Trigger thumb of right hand 06/17/2021    Urinary incontinence in female      Past Surgical History:   Procedure Laterality Date    ABDOMINAL HYSTERECTOMY      BACK SURGERY      HERNIATED DISCS  X3 OR 4 TIMES     BLADDER REPAIR  2002    BREAST BIOPSY      BREAST LUMPECTOMY Right     breast mass    MING HOLE N/A 6/16/2023    Procedure: LEFT SIDED MING HOLE;  Surgeon: Prieto Castillo MD;  Location: [facility];  Service: Neurosurgery;  Laterality: N/A;    CHOLECYSTECTOMY      COLONOSCOPY      CYSTOSCOPY      with dilation    GALLBLADDER SURGERY      INTERSTIM PLACEMENT  2018-19?    INTRAOCULAR LENS INSERTION      TRIGGER FINGER RELEASE Right 6/17/2021    Procedure:  RIGHT FINGER TRIGGER THUMB RELEASE;  Surgeon: Eyad Griffith MD;  Location: [facility];  Service: Orthopedics;  Laterality: Right;    VAGINAL MESH REVISION      vaginal approach per Dr. Liao     Family History   Problem Relation Age of Onset    Cancer Mother         unspecified    Breast cancer Mother         30s    Heart disease Brother     Cancer Brother         unspecified    Diabetes Brother         unspecified type    [place]ig Hyperthermia Neg Hx        Home Medications:  Prior to Admission medications    Medication Sig Start Date End Date Taking? Authorizing Provider   amitriptyline (ELAVIL) 25 MG tablet Take 25 mg by mouth Every Night.    Ryne Reilly MD   atorvastatin (LIPITOR) 40 MG tablet Take 40 mg by mouth Daily.    Ryne Reilly MD   calcium carbonate (OS-CHAPARRO) 600 MG tablet Take 600 mg by mouth 2 (Two) Times a Day With Meals.    Ryne Reilly MD   carvedilol (COREG) 6.25 MG tablet Take 1 tablet by mouth 2 (Two) Times a Day With Meals. 2/10/23   Fabrizio Abraham PA   cyclobenzaprine (FLEXERIL) 10 MG tablet Take 10 mg by mouth 3 (Three) Times a Day As Needed for Muscle Spasms.    Ryne Reilly MD   fesoterodine fumarate (TOVIAZ ER) 8 MG tablet sustained-release 24 hour tablet Take 8 mg by mouth Daily.    Ryne Reilly MD   HYDROcodone-acetaminophen (NORCO) 7.5-325 MG per tablet Take 1 tablet by mouth Every 6 (Six) Hours As Needed for Moderate Pain. 6/21/23   Michael Pacheco MD   levETIRAcetam (KEPPRA) 1000 MG tablet Take 1 tablet by mouth Every 12 (Twelve) Hours. 6/21/23   Michael Pacheco MD   levothyroxine (SYNTHROID, LEVOTHROID) 25 MCG tablet Take 25 mcg by mouth Every Morning.    Ryne Reilly MD   lisinopril (PRINIVIL,ZESTRIL) 10 MG tablet Take 1 tablet by mouth Daily. 2/10/23   Fabrizio Abraham PA   olopatadine (PATANOL) 0.1 % ophthalmic solution 1 drop 2 (Two) Times a Day.    Ryne Reilly MD   omeprazole (priLOSEC) 40  "MG capsule Take 40 mg by mouth Daily.    Provider, MD Ryne   traZODone (DESYREL) 100 MG tablet Take 100 mg by mouth Every Night.    Provider, MD Ryne        Social History:   Social History     Tobacco Use    Smoking status: Former     Packs/day: 1.00     Types: Cigarettes     Passive exposure: Past    Tobacco comments:     quit smoking at age 48   Vaping Use    Vaping Use: Never used   Substance Use Topics    Alcohol use: Never    Drug use: Never         Review of Systems:  Review of Systems   Constitutional:  Negative for chills and fever.   HENT:  Negative for congestion, ear pain and sore throat.    Eyes:  Negative for pain.   Respiratory:  Negative for cough, chest tightness and shortness of breath.    Cardiovascular:  Negative for chest pain.   Gastrointestinal:  Negative for abdominal pain, diarrhea, nausea and vomiting.   Genitourinary:  Negative for flank pain and hematuria.   Musculoskeletal:  Negative for joint swelling.   Skin:  Negative for pallor.   Neurological:  Negative for seizures and headaches.   All other systems reviewed and are negative.     Physical Exam:  /96 (BP Location: Left arm, Patient Position: Lying)   Pulse 70   Temp 97.9 °F (36.6 °C) (Oral)   Resp 18   Ht 162.6 cm (64\")   Wt 65.3 kg (143 lb 15.4 oz)   SpO2 97%   BMI 24.71 kg/m²     Physical Exam      Vital signs were reviewed under triage note.  General appearance - Patient appears well-developed and well-nourished.  Patient is in no acute distress.  Head - Normocephalic, atraumatic.  Pupils - Equal, round, reactive to light.  Extraocular muscles are intact.  Conjunctiva is clear.  Nasal - Normal inspection.  No evidence of trauma or epistaxis.  Tympanic membranes - Gray, intact without erythema or retractions.  Oral mucosa - Pink and moist without lesions or erythema.  Uvula is midline.  Chest wall - Atraumatic.  Chest wall is nontender.  There are no vesicular rashes noted.  Neck - Supple.  Trachea was " midline.  There is no palpable lymphadenopathy or thyromegaly.  There are no meningeal signs  Lungs - Clear to auscultation and percussion bilaterally.  Heart - Regular rate and rhythm without any murmurs, clicks, or gallops.  Abdomen - Soft.  Bowel sounds are present.  There is no palpable tenderness.  There is no rebound, guarding, or rigidity.  There are no palpable masses.  There are no pulsatile masses.  Back - Spine is straight and midline.  There is no CVA tenderness.  Extremities - Intact x4 with full range of motion.  There is no palpable edema.  Pulses are intact x4 and equal.  Neurologic - Patient is awake, alert, and oriented x3.  Cranial nerves II through XII are grossly intact.  Motor and sensory functions grossly intact.  Cerebellar function was normal.  Integument - There are no rashes.  There are no petechia or purpura lesions noted.  There are no vesicular lesions noted.      Procedures:  Procedures      Medical Decision Making:      Comorbidities that affect care:    Breast cancer, diabetes, GERD, hyperlipidemia, kidney stones, DVT    External Notes reviewed:    Previous Clinic Note: Clinic note from Dr. Camejo dated 7/21/2023 was reviewed by me.      The following orders were placed and all results were independently analyzed by me:  Orders Placed This Encounter   Procedures    Comprehensive Metabolic Panel    CBC Auto Differential    Protime-INR    aPTT    Occult Blood, Fecal By Immunoassay - Stool, Per Rectum    Basic Metabolic Panel    Magnesium    Phosphorus    Iron Profile    Ferritin    Basic Metabolic Panel    Magnesium    Phosphorus    CBC Auto Differential    Diet: Regular/House Diet, Diabetic Diets, Cardiac Diets; Healthy Heart (2-3 Na+); Consistent Carbohydrate; Texture: Regular Texture (IDDSI 7); Fluid Consistency: Thin (IDDSI 0)    Verify Informed Consent for Blood Product Administration    Vital Signs    Notify Provider (With Default Parameters)    Intake & Output    Weigh Patient     Daily Weights    Telemetry - Maintain IV Access    Telemetry - Place Orders & Notify Provider of Results When Patient Experiences Acute Chest Pain, Dysrhythmia or Respiratory Distress    Activity - Strict Bed Rest    Verify NPO    Obtain Informed Consent    Follow Anesthesia Guidelines / Protocol    Obtain Informed Consent    Code Status and Medical Interventions:    Hospitalist (on-call MD unless specified)    Gastroenterology (on-call MD unless specified)    Inpatient Case Management  Consult    Inpatient Case Management  Consult    OT Consult: Eval & Treat    PT Consult: Eval & Treat Functional Mobility Below Baseline    Pulse Oximetry,  Spot    Incentive Spirometry    Oxygen Therapy- Nasal Cannula; Titrate 1-6 LPM Per SpO2; 90 - 95%    POC Glucose 4x Daily AC & at Bedtime    POC Glucose Once    POC Glucose Once    POC Glucose Once    POC Glucose Once    POC Glucose Once    POC Glucose Once    POC Glucose Once    Type & Screen    Prepare RBC, 2 Units    ABO RH Specimen Verification    Insert Peripheral IV    Insert Peripheral IV    Inpatient Admission    Initiate Observation Status    Fall Precautions    COLONOSCOPY    UPPER GI ENDOSCOPY    CBC & Differential    CBC & Differential       Medications Given in the Emergency Department:  Medications   sodium chloride 0.9 % flush 10 mL (10 mL Intravenous Not Given 7/24/23 0811)   sodium chloride 0.9 % flush 10 mL (has no administration in time range)   sodium chloride 0.9 % infusion 40 mL (has no administration in time range)   melatonin tablet 5 mg (has no administration in time range)   nitroglycerin (NITROSTAT) SL tablet 0.4 mg (has no administration in time range)   morphine injection 1 mg (has no administration in time range)     And   naloxone (NARCAN) injection 0.4 mg (has no administration in time range)   sennosides-docusate (PERICOLACE) 8.6-50 MG per tablet 2 tablet (2 tablets Oral Not Given 7/24/23 0803)     And    polyethylene glycol (MIRALAX) packet 17 g (has no administration in time range)     And   bisacodyl (DULCOLAX) EC tablet 5 mg (has no administration in time range)   ondansetron (ZOFRAN) injection 4 mg (has no administration in time range)   dextrose (GLUTOSE) oral gel 15 g (has no administration in time range)   dextrose (D50W) (25 g/50 mL) IV injection 25 g (has no administration in time range)   glucagon (GLUCAGEN) injection 1 mg (has no administration in time range)   Insulin Lispro (humaLOG) injection 2-7 Units (2 Units Subcutaneous Not Given 7/24/23 1730)   amitriptyline (ELAVIL) tablet 25 mg (25 mg Oral Given 7/23/23 2107)   levETIRAcetam (KEPPRA) tablet 1,000 mg (1,000 mg Oral Not Given 7/24/23 0811)   levothyroxine (SYNTHROID, LEVOTHROID) tablet 25 mcg (25 mcg Oral Not Given 7/24/23 0543)   traZODone (DESYREL) tablet 100 mg (100 mg Oral Given 7/23/23 2108)   HYDROcodone-acetaminophen (NORCO) 7.5-325 MG per tablet 1 tablet (has no administration in time range)   cyclobenzaprine (FLEXERIL) tablet 10 mg (has no administration in time range)   atorvastatin (LIPITOR) tablet 40 mg (40 mg Oral Given 7/23/23 2107)   carvedilol (COREG) tablet 6.25 mg (6.25 mg Oral Given 7/24/23 1759)   lisinopril (PRINIVIL,ZESTRIL) tablet 10 mg (10 mg Oral Not Given 7/24/23 0811)   lactated ringers infusion (30 mL/hr Intravenous Not Given 7/24/23 1324)   apixaban (ELIQUIS) tablet 5 mg (5 mg Oral Given 7/24/23 1452)   pantoprazole (PROTONIX) EC tablet 40 mg (has no administration in time range)   pantoprazole (PROTONIX) injection 40 mg (40 mg Intravenous Given 7/22/23 1559)   polyethylene glycol (GoLYTELY) solution 4,000 mL (4,000 mL Oral Given 7/23/23 1132)   potassium chloride (MICRO-K) CR capsule 40 mEq (40 mEq Oral Given 7/24/23 5808)        ED Course:    The patient was seen and evaluated in the ED by me.  The above history and physical examination was performed as document.  Diagnostic data was obtained.  Results reviewed.   Patient was confirmed to be anemic.  Patient was typed and crossed for 2 units of blood.  In addition occult fecal blood was sent off by nursing staff which came back positive for blood.  Patient was given a dose of Protonix.  Patient be admitted for further evaluation work-up of her occult positive blood with her associated anemia especially with the need to be on anticoagulants due to her DVT.    Labs:    Lab Results (last 24 hours)       Procedure Component Value Units Date/Time    Basic Metabolic Panel [[id number]]  (Abnormal) Collected: 07/24/23 0402    Specimen: Blood Updated: 07/24/23 0524     Glucose 100 mg/dL      BUN 8 mg/dL      Creatinine 0.77 mg/dL      Sodium 131 mmol/L      Potassium 3.2 mmol/L      Chloride 101 mmol/L      CO2 17.8 mmol/L      Calcium 8.3 mg/dL      BUN/Creatinine Ratio 10.4     Anion Gap 12.2 mmol/L      eGFR 80.6 mL/min/1.73     Narrative:      GFR Normal >60  Chronic Kidney Disease <60  Kidney Failure <15    The GFR formula is only valid for adults with stable renal function between ages 18 and 70.    CBC & Differential [[id number]]  (Abnormal) Collected: 07/24/23 0402    Specimen: Blood Updated: 07/24/23 0501    Narrative:      The following orders were created for panel order CBC & Differential.  Procedure                               Abnormality         Status                     ---------                               -----------         ------                     CBC Auto Differential[[id number]]        Abnormal            Final result                 Please view results for these tests on the individual orders.    Magnesium [[id number]]  (Normal) Collected: 07/24/23 0402    Specimen: Blood Updated: 07/24/23 0524     Magnesium 1.9 mg/dL     Phosphorus [[id number]]  (Normal) Collected: 07/24/23 0402    Specimen: Blood Updated: 07/24/23 0525     Phosphorus 2.6 mg/dL     CBC Auto Differential [[id number]]  (Abnormal) Collected: 07/24/23 0402    Specimen: Blood Updated: 07/24/23 0501      WBC 4.47 10*3/mm3      RBC 4.03 10*6/mm3      Hemoglobin 11.2 g/dL      Hematocrit 36.0 %      MCV 89.3 fL      MCH 27.8 pg      MCHC 31.1 g/dL      RDW 14.8 %      RDW-SD 48.7 fl      MPV 9.0 fL      Platelets 226 10*3/mm3      Neutrophil % 59.6 %      Lymphocyte % 21.9 %      Monocyte % 11.4 %      Eosinophil % 6.0 %      Basophil % 0.7 %      Immature Grans % 0.4 %      Neutrophils, Absolute 2.66 10*3/mm3      Lymphocytes, Absolute 0.98 10*3/mm3      Monocytes, Absolute 0.51 10*3/mm3      Eosinophils, Absolute 0.27 10*3/mm3      Basophils, Absolute 0.03 10*3/mm3      Immature Grans, Absolute 0.02 10*3/mm3      nRBC 0.0 /100 WBC     POC Glucose Once [[id number]]  (Normal) Collected: 07/24/23 1724    Specimen: Blood Updated: 07/24/23 1734     Glucose 84 mg/dL      Comment: Serial Number: [id number]:  [id number]                Imaging:    Duplex Venous Lower Extremity - Bilateral CV-READ    Result Date: 7/24/2023    Acute right lower extremity deep vein thrombosis noted in the common femoral, proximal femoral and posterior tibial.   Acute left lower extremity deep vein thrombosis noted in the external iliac, proximal femoral, mid femoral, distal femoral, popliteal, posterial tibial, peroneal and gastrocnemius.   Acute-on-chronic left lower extremity deep vein thrombosis noted in the common femoral.   Acute left lower extremity superficial thrombophlebitis noted in the great saphenous (above knee).   All other veins appeared normal bilaterally.        Differential Diagnosis and Discussion:    Anemia    All labs were reviewed and interpreted by me.    [facility]       Critical Care Note: Total Critical Care time of 45 minutes. Total critical care time documented does not include time spent on separately billed procedures for services of nurses or physician assistants. I personally saw and examined the patient. I have reviewed all diagnostic interpretations and treatment plans as written. I was present for the key  portions of any procedures performed and the inclusive time noted in any critical care statement. Critical care time includes patient management by me, time spent at the patients bedside,  time to review lab and imaging results, discussing patient care, documentation in the medical record, and time spent with family or caregiver.    Patient Care Considerations:    A duplex ultrasound was considered to be ordered at the ED however the patient requires acute hospitalization and thus this test be performed inpatient as deemed necessary to confirm or refute the report from the nursing home of a acute DVT and the need for anticoagulants.      Consultants/Shared Management Plan:    Hospitalist: I have discussed the case with hospitalist who agrees to accept the patient for admission.    Social Determinants of Health:    Patient is a nursing home/assisted living resident and has reliable access to care.      Disposition and Care Coordination:    Admit:   Through independent evaluation of the patient's history, physical, and imperical data, the patient meets criteria for observation/admission to the hospital.        Final diagnoses:   Severe anemia   Occult blood positive stool        ED Disposition       ED Disposition   Decision to Admit    Condition   --    Comment   Level of Care: Telemetry [5]   Diagnosis: Acute on chronic anemia [[id number]]                 This medical record created using voice recognition software.             Devin Espitia DO  07/24/23 1914

## 2023-07-22 NOTE — Clinical Note
Level of Care: Telemetry [5]   Diagnosis: Anemia [[id number]]   Certification: I Certify That Inpatient Hospital Services Are Medically Necessary For Greater Than 2 Midnights

## 2023-07-22 NOTE — CONSULTS
[facility]  Inpatient Consult Note  Today's date:  07/22/23    Rebecca Mchugh  1950       Referring Provider: No ref. provider found  Primary Physician: Colin Jamil MD       Date of Admission: 7/22/2023  Date of Service:  07/22/23    Reason for Consultation/Chief Complaint: Anemia.  Heme positive stool.    History of present illness: 73-year-old woman who was brought to the emergency room from rehab center after blood work revealed significant anemia and in the emergency room stool was tested positive for occult blood.  About 5 weeks ago patient was admitted to the hospital after sustaining a fall at home and was diagnosed with subdural hematoma for which she underwent evacuation on June 16.  Patient also had some seizure activity after the surgery, so she was transferred to the rehab center for further management.  While undergoing rehab in local SNF patient was diagnosed with a left lower extremity DVT and prior to initiating therapy with Eliquis she had blood work which revealed quite significant anemia with hemoglobin dropping from her baseline of 8.8 a month ago to 6.8.  Patient however denies any symptoms of overt gastrointestinal bleeding.  Specifically patient has not seen any blood in the stool.  Did not have any melena.  Did not have any hematemesis or in general nausea or vomiting.  Patient denies any abdominal pain.  No dysphagia or odynophagia.  Patient usually moves her bowels every 4 to 5 days, and that has been her pattern for many years.  It did not change recently at all.  Frequently patient has been using stool softeners and laxatives in order to move her bowels.  Yesterday actually patient had to use an enema to facilitate bowel movement, and actually that can explain heme positivity of the stool.  Patient did have a screening colonoscopy approximately 5 years ago and according to patient no significant abnormalities were found.  Patient also has a history  of upper endoscopy evaluation of GERD symptoms which was probably 10 years ago.  Also according to the patient there were no significant abnormalities and patient has been managing her GERD symptoms successfully with omeprazole daily.  No significant weight loss recently.  CBC in the emergency room revealed normal white blood cell count and normal platelet count while hemoglobin and hematocrit were quite low at 6.8 and 20.9 respectively.  Anemia was normocytic.  Back on June 21 hemoglobin was 8.8.  Unremarkable differential.  No significant coagulopathy as prothrombin time was 15.4 with INR of 1.21.  CMP is remarkable for mildly low sodium of 133, borderline elevated creatinine of 1.05, normal BUN, minimally elevated glucose at 126, low albumin of 2.9 and the rest of liver function test being within normal limits.  Iron profile revealed iron deficiency with iron saturation of 8%.    Past Medical History:   Diagnosis Date    Acid reflux     Ankle fracture 2020    Bladder disorder     Breast cancer     Right    Cancer     Colitis     Diabetes     Diabetes mellitus     DVT (deep venous thrombosis)     GERD (gastroesophageal reflux disease)     History of spinal fracture 11/2020    Hyperlipemia     Recurrent UTI (urinary tract infection)     Renal calculus or stone     Seasonal allergies     Trigger thumb of right hand 06/17/2021    Urinary incontinence in female        Past Surgical History:   Procedure Laterality Date    ABDOMINAL HYSTERECTOMY      BACK SURGERY      HERNIATED DISCS  X3 OR 4 TIMES     BLADDER REPAIR  2002    BREAST BIOPSY      BREAST LUMPECTOMY Right     breast mass    MING HOLE N/A 6/16/2023    Procedure: LEFT SIDED MING HOLE;  Surgeon: Prieto Castillo MD;  Location: [facility] OR;  Service: Neurosurgery;  Laterality: N/A;    CHOLECYSTECTOMY      COLONOSCOPY      CYSTOSCOPY      with dilation    GALLBLADDER SURGERY      INTERSTIM PLACEMENT  2018-19?    INTRAOCULAR LENS INSERTION      TRIGGER  "FINGER RELEASE Right 6/17/2021    Procedure: RIGHT FINGER TRIGGER THUMB RELEASE;  Surgeon: Eyad Griffith MD;  Location: [facility] OR [facility];  Service: Orthopedics;  Laterality: Right;    VAGINAL MESH REVISION      vaginal approach per Dr. Liao        Allergies   Allergen Reactions    Strawberry Hives       (Not in a hospital admission)      Hospital Medications (active)         Dose Frequency Start End    amitriptyline (ELAVIL) tablet 25 mg 25 mg Nightly 7/22/2023     Route: Oral    bisacodyl (DULCOLAX) EC tablet 5 mg 5 mg Daily PRN 7/22/2023     Admin Instructions: Use if no bowel movement after 12 hours.  Swallow whole. Do not crush, split, or chew tablet.    Route: Oral    Linked Group 1: See [facility] for [facility] Linked Orders Report.        Calcium Replacement - Follow Nurse / BPA Driven Protocol  As Needed 7/22/2023     Admin Instructions: Open Order & Select \"BHS Electrolyte Replacement Protocol Algorithm\" to View Details    Route: Does not apply    cyclobenzaprine (FLEXERIL) tablet 10 mg 10 mg 3 Times Daily PRN 7/22/2023     Route: Oral    dextrose (D50W) (25 g/50 mL) IV injection 25 g 25 g Every 15 Minutes PRN 7/22/2023     Admin Instructions: Blood sugar less than 70; patient has IV access - Unresponsive, NPO or Unable To Safely Swallow    Route: Intravenous    dextrose (GLUTOSE) oral gel 15 g 15 g Every 15 Minutes PRN 7/22/2023     Admin Instructions: BS<70, Patient Alert, Is not NPO, Can safely swallow.    Route: Oral    glucagon (GLUCAGEN) injection 1 mg 1 mg Every 15 Minutes PRN 7/22/2023     Admin Instructions: Blood Glucose Less Than 70 - Patient Without IV Access - Unresponsive, NPO or Unable To Safely Swallow  Reconstitute powder for injection by adding 1 mL of -supplied sterile diluent or sterile water for injection to a vial containing 1 mg of the drug, to provide solutions containing 1 mg/mL. Shake vial gently to dissolve.    Route: Intramuscular    HYDROcodone-acetaminophen (NORCO) " "7.5-325 MG per tablet 1 tablet 1 tablet Every 6 Hours PRN 7/22/2023     Admin Instructions: Based on patient request - if ordered for moderate or severe pain, provider allows for administration of a medication prescribed for a lower pain scale.  [DAKSHA]    Do not exceed 4 grams of acetaminophen in a 24 hr period. Max dose of 2gm for AST/ALT greater than 120 units/L        If given for pain, use the following pain scale:   Mild Pain = Pain Score of 1-3, CPOT 1-2  Moderate Pain = Pain Score of 4-6, CPOT 3-4  Severe Pain = Pain Score of 7-10, CPOT 5-8    Route: Oral    Insulin Lispro (humaLOG) injection 2-7 Units 2-7 Units 4 Times Daily Before Meals & Nightly 7/22/2023     Admin Instructions: Correction Insulin - Low Dose - Total Insulin Dose Less Than 40 units/day (Lean, Elderly or Renal Patients)    Blood Glucose 150-199 mg/dL - 2 units  Blood Glucose 200-249 mg/dL - 3 units  Blood Glucose 250-299 mg/dL - 4 units  Blood Glucose 300-349 mg/dL - 5 units  Blood Glucose 350-400 mg/dL - 6 units  Blood Glucose Greater Than 400 mg/dL - 7 units & Call Provider   Caution: Look alike/sound alike drug alert    Route: Subcutaneous    levETIRAcetam (KEPPRA) tablet 1,000 mg 1,000 mg Every 12 Hours Scheduled 7/22/2023     Admin Instructions: Caution: Look alike/sound alike drug alert.    Route: Oral    levothyroxine (SYNTHROID, LEVOTHROID) tablet 25 mcg 25 mcg Every Early Morning 7/23/2023     Admin Instructions: Take on empty stomach.    Route: Oral    Magnesium Standard Dose Replacement - Follow Nurse / BPA Driven Protocol  As Needed 7/22/2023     Admin Instructions: Open Order & Select \"BHS Electrolyte Replacement Protocol Algorithm\" to View Details    Route: Does not apply    melatonin tablet 5 mg 5 mg Nightly PRN 7/22/2023     Route: Oral    morphine injection 1 mg 1 mg Every 4 Hours PRN 7/22/2023 7/29/2023    Admin Instructions: Based on patient request - if ordered for moderate or severe pain, provider allows for " "administration of a medication prescribed for a lower pain scale.  If given for pain, use the following pain scale:  Mild Pain = Pain Score of 1-3, CPOT 1-2  Moderate Pain = Pain Score of 4-6, CPOT 3-4  Severe Pain = Pain Score of 7-10, CPOT 5-8    Route: Intravenous    Linked Group 2: See [facility] for full Linked Orders Report.        morphine injection 1 mg 1 mg Every 4 Hours PRN 7/22/2023 7/29/2023    Admin Instructions: Based on patient request - if ordered for moderate or severe pain, provider allows for administration of a medication prescribed for a lower pain scale.  If given for pain, use the following pain scale:  Mild Pain = Pain Score of 1-3, CPOT 1-2  Moderate Pain = Pain Score of 4-6, CPOT 3-4  Severe Pain = Pain Score of 7-10, CPOT 5-8    Route: Intravenous    Linked Group 3: See [facility] for full Linked Orders Report.        naloxone (NARCAN) injection 0.4 mg 0.4 mg Every 5 Minutes PRN 7/22/2023     Admin Instructions: If respiratory rate is less than 8 breaths/minute or patient is difficult to arouse stop any narcotics and contact physician.   Administer slow IV push. Repeat as ordered until patient's respiratory rate is greater than 12 breaths/minute.    Route: Intravenous    Linked Group 2: See [facility] for full Linked Orders Report.        naloxone (NARCAN) injection 0.4 mg 0.4 mg Every 5 Minutes PRN 7/22/2023     Admin Instructions: If Respiratory Rate Less Than 8 or Patient is Difficult to Arouse, Stop ALL Narcotics & Contact Provider.  Administer Slow IV Push.  Repeat As Ordered Until Respiratory Rate is Greater Than 12.    Route: Intravenous    Linked Group 3: See [facility] for full Linked Orders Report.        nitroglycerin (NITROSTAT) SL tablet 0.4 mg 0.4 mg Every 5 Minutes PRN 7/22/2023     Admin Instructions: If Pain Unrelieved After 3 Doses Notify MD    Route: Sublingual    ondansetron (ZOFRAN) injection 4 mg 4 mg Every 6 Hours PRN 7/22/2023     Admin Instructions: \"If multiple " "N/V medications ordered, use in the following order: Ondansetron, Prochlorperazine, Promethazine. Use PO unless patient refuses or patient unable to swallow.\"      Route: Intravenous    pantoprazole (PROTONIX) injection 40 mg 40 mg Every 12 Hours Scheduled 7/22/2023     Admin Instructions: Dilute with 10 mL of 0.9% NaCl and give IV push over 2 minutes.    Route: Intravenous    Phosphorus Replacement - Follow Nurse / BPA Driven Protocol  As Needed 7/22/2023     Admin Instructions: Open Order & Select \"BHS Electrolyte Replacement Protocol Algorithm\" to View Details    Route: Does not apply    polyethylene glycol (MIRALAX) packet 17 g 17 g Daily PRN 7/22/2023     Admin Instructions: Use if no bowel movement after 12 hours. Mix in 6-8 ounces of water.  Use 4-8 ounces of water, tea, or juice for each 17 gram dose.    Route: Oral    Linked Group 1: See [facility] for full Linked Orders Report.        Potassium Replacement - Follow Nurse / BPA Driven Protocol  As Needed 7/22/2023     Admin Instructions: Open Order & Select \"BHS Electrolyte Replacement Protocol Algorithm\" to View Details    Route: Does not apply    sennosides-docusate (PERICOLACE) 8.6-50 MG per tablet 2 tablet 2 tablet 2 Times Daily 7/22/2023     Admin Instructions: HOLD MEDICATION IF PATIENT HAS HAD BOWEL MOVEMENT. Start bowel management regimen if patient has not had a bowel movement after 12 hours.    Route: Oral    Linked Group 1: See [facility] for full Linked Orders Report.        sodium chloride 0.9 % flush 10 mL 10 mL Every 12 Hours Scheduled 7/22/2023     Route: Intravenous    sodium chloride 0.9 % flush 10 mL 10 mL As Needed 7/22/2023     Route: Intravenous    sodium chloride 0.9 % infusion 40 mL 40 mL As Needed 7/22/2023     Admin Instructions: Following administration of an IV intermittent medication, flush line with 40mL NS at 100mL/hr.    Route: Intravenous    sodium chloride 0.9 % infusion 100 mL/hr Continuous 7/22/2023     Route: Intravenous "    traZODone (DESYREL) tablet 100 mg 100 mg Nightly 7/22/2023     Admin Instructions: Take with food.  Caution: Look alike/sound alike drug alert    Route: Oral            Social History     Tobacco Use    Smoking status: Former     Packs/day: 1.00     Types: Cigarettes     Passive exposure: Past    Smokeless tobacco: Not on file    Tobacco comments:     quit smoking at age 48   Substance Use Topics    Alcohol use: Never        Past Family History:  Family History   Problem Relation Age of Onset    Cancer Mother         unspecified    Breast cancer Mother         30s    Heart disease Brother     Cancer Brother         unspecified    Diabetes Brother         unspecified type    Malig Hyperthermia Neg Hx        Review of Systems:  Constitutional: No unexpected weight change, + fatigue, no unexplained fever, no sweats or chills.   HEENT: No icteric sclera.  No hearing or visual deficits.  No sore throat.  No chronic nasal discharge.  Pulmonary: No chronic cough.  No hemoptysis.  No shortness of breath.  Cardiovascular: No chest pain.  No palpitations.  No shortness of breath.  Gastrointestinal: As above.  Musculoskeletal/extremities: No peripheral edema.  No cyanosis.  No claudications.  No back pain.  Genitourinary: No dysuria.  No blood in stool.  No urethral discharges.  Neurologic: + Recent seizures after evacuation of subdural hematoma.  No headaches.  No dizziness.  No gait problems.  Skin: No rash.  No icterus.  Mental: No psychosis.  No confusions.  No hallucinations.      Physical Exam:  Temp:  [97.9 °F (36.6 °C)-99.2 °F (37.3 °C)] 97.9 °F (36.6 °C)  Heart Rate:  [69-73] 70  Resp:  [18] 18  BP: ()/(43-62) 114/62  Body mass index is 26.68 kg/m².    Intake/Output Summary (Last 24 hours) at 7/22/2023 1815  Last data filed at 7/22/2023 1732  Gross per 24 hour   Intake 370 ml   Output --   Net 370 ml     I/O this shift:  In: 370 [Blood:370]  Out: -     General appearance: 73-year-old female who appears to be  no acute distress.  Awake, alert and oriented x3.  HEENT: Nonicteric sclerae.  Moist oral mucosa.  PERRLA.  EOMI.  Clear pharynx.  Lungs: Clear to auscultation bilaterally.  No wheezing, rales or rhonchi.  Heart: Regular rate and rhythm.  Normal S1 and S2, no S3, S4 or murmur.  Abdomen: Soft, nondistended, nontender to palpation, with normoactive bowel sounds, no hepatosplenomegaly, no palpable masses.  Extremities: No cyanosis or pulse deficits.  1+ pitting edema of left lower extremity.  Skin: No rash or jaundice.    Results Review:  Lab Results (last 24 hours)       Procedure Component Value Units Date/Time    Ferritin [[id number]]  (Abnormal) Collected: 07/22/23 1219    Specimen: Blood Updated: 07/22/23 1701     Ferritin 609.00 ng/mL     Narrative:      <12 ng/mL usually associated with Iron Deficiency Anemia. Above normal range levels may be due to Hepatic and/or Chronic Inflammatory Disease.  Results may be falsely decreased if patient taking Biotin.      Iron Profile [[id number]]  (Abnormal) Collected: 07/22/23 1219    Specimen: Blood Updated: 07/22/23 1655     Iron 18 mcg/dL      Iron Saturation (TSAT) 8 %      Transferrin 155 mg/dL      TIBC 231 mcg/dL     Occult Blood, Fecal By Immunoassay - Stool, Per Rectum [[id number]]  (Abnormal) Collected: 07/22/23 1332    Specimen: Stool from Per Rectum Updated: 07/22/23 1342     Occult Blood, Fecal by Immunoassay Positive    Comprehensive Metabolic Panel [[id number]]  (Abnormal) Collected: 07/22/23 1219    Specimen: Blood Updated: 07/22/23 1253     Glucose 126 mg/dL      BUN 15 mg/dL      Creatinine 1.05 mg/dL      Sodium 133 mmol/L      Potassium 3.9 mmol/L      Chloride 101 mmol/L      CO2 22.8 mmol/L      Calcium 8.7 mg/dL      Total Protein 5.6 g/dL      Albumin 2.9 g/dL      ALT (SGPT) 10 U/L      AST (SGOT) 9 U/L      Alkaline Phosphatase 117 U/L      Total Bilirubin 0.8 mg/dL      Globulin 2.7 gm/dL      A/G Ratio 1.1 g/dL      BUN/Creatinine Ratio 14.3     Anion  Gap 9.2 mmol/L      eGFR 55.5 mL/min/1.73     Narrative:      GFR Normal >60  Chronic Kidney Disease <60  Kidney Failure <15    The GFR formula is only valid for adults with stable renal function between ages 18 and 70.    Protime-INR [[id number]]  (Abnormal) Collected: 07/22/23 1219    Specimen: Blood Updated: 07/22/23 1252     Protime 15.4 Seconds      INR 1.21    Narrative:      Suggested Therapeutic Ranges For Oral Anticoagulant Therapy:  Level of Therapy                      INR Target Range  Standard Dose                            2.0-3.0  High Dose                                2.5-3.5  Patients not receiving anticoagulant  Therapy Normal Range                     0.86-1.15    aPTT [[id number]]  (Abnormal) Collected: 07/22/23 1219    Specimen: Blood Updated: 07/22/23 1252     PTT 32.7 seconds     CBC & Differential [[id number]]  (Abnormal) Collected: 07/22/23 1219    Specimen: Blood Updated: 07/22/23 1238    Narrative:      The following orders were created for panel order CBC & Differential.  Procedure                               Abnormality         Status                     ---------                               -----------         ------                     CBC Auto Differential[[id number]]        Abnormal            Final result                 Please view results for these tests on the individual orders.    CBC Auto Differential [[id number]]  (Abnormal) Collected: 07/22/23 1219    Specimen: Blood Updated: 07/22/23 1238     WBC 4.76 10*3/mm3      RBC 2.48 10*6/mm3      Hemoglobin 6.8 g/dL      Hematocrit 20.9 %      MCV 84.3 fL      MCH 27.4 pg      MCHC 32.5 g/dL      RDW 14.6 %      RDW-SD 43.9 fl      MPV 9.0 fL      Platelets 250 10*3/mm3      Neutrophil % 66.6 %      Lymphocyte % 20.4 %      Monocyte % 9.5 %      Eosinophil % 2.7 %      Basophil % 0.4 %      Immature Grans % 0.4 %      Neutrophils, Absolute 3.17 10*3/mm3      Lymphocytes, Absolute 0.97 10*3/mm3      Monocytes, Absolute 0.45 10*3/mm3       Eosinophils, Absolute 0.13 10*3/mm3      Basophils, Absolute 0.02 10*3/mm3      Immature Grans, Absolute 0.02 10*3/mm3      nRBC 0.0 /100 WBC             Radiology Review:  Imaging Results (Last 72 Hours)       ** No results found for the last 72 hours. **            Impression/Plan:  Patient Active Problem List   Diagnosis Code    Trigger thumb of right hand M65.311    Aftercare following right 1st TFR Z47.89    Osteoporosis M81.0    Subdural hemorrhage I62.00    Subdural hematoma S06.5XAA    ICH (intracerebral hemorrhage) I61.9    HTN (hypertension) I10    Distal radius fracture, right S52.501A    Right distal ulnar fracture S52.601A    DM (diabetes mellitus), type 2 E11.9    Hypothyroidism E03.9    Mixed hyperlipidemia E78.2    Fall W19.XXXA    Seizure after head injury R56.1    [place] catheter present Z97.8    Other constipation K59.09    Left leg swelling M79.89    Anemia D64.9    Acute on chronic anemia D64.9     73-year-old woman with a recent history of subdural hematoma that was drained surgically.  Patient was in the rehab center where she was diagnosed with left lower extremity DVT and prior to the initiation anticoagulation therapy blood work was drawn and revealed quite significant normocytic anemia with a 2 g drop in hemoglobin over the last 4 weeks.  In the meantime, patient did not exhibit any symptoms of overt significant gastrointestinal bleeding, but her stool was tested positive for occult blood in the emergency room.  Patient has a history of negative screening colonoscopy about 5 years ago and negative upper endoscopy evaluation of GERD approximately 10 years ago.  GERD has been successfully managed with daily omeprazole and patient has not experienced any dysphagia or odynophagia, nausea vomiting or weight loss.  Patient just received transfusion of 1 unit of PRBCs.  Patient is being admitted to the floor and will be monitored clinically for the signs of overt gastrointestinal bleeding  along with monitoring H&H.  Ideally patient should proceed with both upper endoscopy and colonoscopy for evaluation of such significant anemia, considering that patient should be placed on anticoagulation due to DVT.  Consideration should be for placement of IVC filter as well to prevent PE.  I agree with clear liquid diet for now.  Will re-evaluate patient in the morning.    Alexandr Sanchez MD  07/22/23   18:15 EDT

## 2023-07-22 NOTE — H&P
[facility] HISTORY AND PHYSICAL  Date: 2023   Patient Name: Rebecca Mchugh  : 1950  MRN: [id number]  Primary Care Physician:  Colin Jamil MD  Date of admission: 2023    Subjective   Subjective     Chief Complaint: Anemia    HPI:    Rebecca Mchugh is a 73 y.o. female with a past medical history significant for right-sided breast cancer, hypertension, GERD, hypothyroidism, chronic pain syndrome.  The patient recently was diagnosed with a subdural hematoma (2023) status post chemical fall for which she underwent an evacuation via bur hole on .  She also sustained a proximal and distal radius/ulnar fracture.  Patient has been undergoing  rehab at a local SNF.  Patient reportedly was recently diagnosed with a left lower extremity DVT.  Prior to initiating Eliquis the patient underwent routine labs to evaluate her H&H.  She was found to have a low H&H and subsequently she was referred to the emergency department for further evaluation.  In the ED hemoglobin was noted to be 6.8 down from 8.8 1-month ago.  She was found to also be Hemoccult positive in the emergency department as well.  The service was contacted for admission for further evaluation and treatment.    Patient reports that she has been feeling fine other than some swelling in her lower extremity which she states has been ongoing for a few days.  She denies any pain of her lower extremities.  She denies any recent gross blood loss that she is aware of.  She denies any melena, bright red blood per rectum, hematemesis, hematuria or epistaxis.  In fact patient reports that she is actually been constipated lately and has been having difficulties having bowel movements.  She denies any shortness of breath or chest pain.  Personal History     Past Medical History:  Subdural hematoma   Seizure disorder  GERD  Right breast cancer  Diabetes mellitus type 2  Spinal fracture  Urinary tract infection  Renal  calculi  Hypothyroidism  Chronic pain syndrome  Hypertension      Past Surgical History:  Hematoma evacuation via bur hole.    • ABDOMINAL HYSTERECTOMY       • BACK SURGERY         HERNIATED DISCS  X3 OR 4 TIMES    • BLADDER REPAIR   2002   • BREAST BIOPSY       • BREAST LUMPECTOMY Right       breast mass   • CHOLECYSTECTOMY       • COLONOSCOPY       • CYSTOSCOPY         with dilation   • GALLBLADDER SURGERY       • INTERSTIM PLACEMENT   2018-19?   • INTRAOCULAR LENS INSERTION       • TRIGGER FINGER RELEASE Right 6/17/2021     Procedure: RIGHT FINGER TRIGGER THUMB RELEASE;  Surgeon: Eyad Griffith MD;  Location: [facility] OR [facility] [place] [facility];  Service: Orthopedics;  Laterality: Right;   • VAGINAL MESH REVISION         vaginal approach per Dr. Liao     Family History:   • Cancer Mother           unspecified   • Breast cancer Mother           30s   • Heart disease Brother     • Cancer Brother           unspecified   • Diabetes Brother           unspecified type   • Malig Hyperthermia Neg Hx        Social History:   Socioeconomic History   • Marital status:    • Number of children: 2   Tobacco Use   • Smoking status: Former       Packs/day: 1.00       Types: Cigarettes       Passive exposure: Past   • Tobacco comments:       quit smoking at age 48   Vaping Use   • Vaping Use: Never used   Substance and Sexual Activity   • Alcohol use: Never   • Drug use: Never   • Sexual activity: Defer           Home Medications:  HYDROcodone-acetaminophen, amitriptyline, atorvastatin, calcium carbonate, carvedilol, cyclobenzaprine, fesoterodine fumarate, levETIRAcetam, levothyroxine, lisinopril, olopatadine, omeprazole, and traZODone    Allergies:  Allergies   Allergen Reactions   • [place] Hives       Review of Systems   All systems were reviewed and negative except for: As noted in the HPI    Objective   Objective     Vitals:   Temp:  [98.5 °F (36.9 °C)-99.2 °F (37.3 °C)] 98.6 °F (37 °C)  Heart Rate:  [69-73] 71  Resp:  [18] 18  BP:  ()/(43-59) 121/43    Physical Exam    Constitutional: Awake, alert, no acute distress   Eyes: Pupils equal, sclerae anicteric, no conjunctival injection   HENT: NCAT, mucous membranes moist   Neck: Supple, no thyromegaly, no lymphadenopathy, trachea midline   Respiratory: Clear to auscultation bilaterally, nonlabored respirations    Cardiovascular: RRR, no murmurs, rubs, or gallops, palpable pedal pulses bilaterally   Gastrointestinal: Positive bowel sounds, soft, nontender, nondistended   Musculoskeletal: Mild nonpitting edema of the left edema, no clubbing or cyanosis to extremities pedal pulses 2+ bilaterally.  Right upper extremity in a wrist splint.               Psychiatric: Appropriate affect, cooperative   Neurologic: Oriented x 3, in all extremities equally-no focal weakness, Cranial Nerves grossly intact, speech clear   Skin: No rashes     Result Review    Result Review:  I have personally reviewed the results from the time of this admission to 7/22/2023 16:21 EDT and agree with these findings:  [x]  Laboratory  [x]  Microbiology  []  Radiology  [x]  EKG/Telemetry   []  Cardiology/Vascular   []  Pathology  [x]  Old records  []  Other:      Assessment & Plan   Assessment / Plan     Assessment/Plan:   Acute on chronic anemia  Occult positive stools  Left lower extremity DVT  Recent subdural hematoma status post evacuation via bur hole  Proximal and distal radius/ulnar fracture  Hypertension  Diabetes mellitus type 2  Seizure disorder    Admit to the hospitalist service for further evaluation and treatment.  Patient will be transfused 1 unit of packed red blood cells.  Follow-up H&H posttransfusion transfuse as needed for hemoglobin less than 7.  GI has been consulted from the emergency department.  Will await further recommendations.  Patient will be initiated on IV Protonix 40 mg twice daily.  Will obtain bilateral lower extremity venous duplex to confirm recent reported DVT.  Anticoagulation  currently on hold given occult positive stools as well as recent subdural hematoma.  Accu-Cheks will be ordered ACHS with sliding scale insulin as needed.  Home medications will be reviewed confirmed and resumed as clinically indicated.    DVT prophylaxis:  No DVT prophylaxis order currently exists.    CODE STATUS:     DNR    Admission Status:  I believe this patient meets inpatient status.    Electronically signed by Leanne Henderson MD, 07/22/23, 4:21 PM EDT.

## 2023-07-23 LAB
ANION GAP SERPL CALCULATED.3IONS-SCNC: 9.6 MMOL/L (ref 5–15)
BUN SERPL-MCNC: 12 MG/DL (ref 8–23)
BUN/CREAT SERPL: 14 (ref 7–25)
CALCIUM SPEC-SCNC: 8.5 MG/DL (ref 8.6–10.5)
CHLORIDE SERPL-SCNC: 106 MMOL/L (ref 98–107)
CO2 SERPL-SCNC: 16.4 MMOL/L (ref 22–29)
CREAT SERPL-MCNC: 0.86 MG/DL (ref 0.57–1)
DEPRECATED RDW RBC AUTO: 43.1 FL (ref 37–54)
EGFRCR SERPLBLD CKD-EPI 2021: 70.6 ML/MIN/1.73
ERYTHROCYTE [DISTWIDTH] IN BLOOD BY AUTOMATED COUNT: 14.7 % (ref 12.3–15.4)
GLUCOSE BLDC GLUCOMTR-MCNC: 101 MG/DL (ref 70–99)
GLUCOSE BLDC GLUCOMTR-MCNC: 128 MG/DL (ref 70–99)
GLUCOSE BLDC GLUCOMTR-MCNC: 91 MG/DL (ref 70–99)
GLUCOSE SERPL-MCNC: 92 MG/DL (ref 65–99)
HCT VFR BLD AUTO: 32.5 % (ref 34–46.6)
HGB BLD-MCNC: 11.4 G/DL (ref 12–15.9)
MAGNESIUM SERPL-MCNC: 2 MG/DL (ref 1.6–2.4)
MCH RBC QN AUTO: 28.2 PG (ref 26.6–33)
MCHC RBC AUTO-ENTMCNC: 35.1 G/DL (ref 31.5–35.7)
MCV RBC AUTO: 80.4 FL (ref 79–97)
PHOSPHATE SERPL-MCNC: 3.7 MG/DL (ref 2.5–4.5)
PLATELET # BLD AUTO: 236 10*3/MM3 (ref 140–450)
PMV BLD AUTO: 9.5 FL (ref 6–12)
POTASSIUM SERPL-SCNC: 4.1 MMOL/L (ref 3.5–5.2)
RBC # BLD AUTO: 4.04 10*6/MM3 (ref 3.77–5.28)
SODIUM SERPL-SCNC: 132 MMOL/L (ref 136–145)
WBC NRBC COR # BLD: 5.49 10*3/MM3 (ref 3.4–10.8)

## 2023-07-23 PROCEDURE — 36415 COLL VENOUS BLD VENIPUNCTURE: CPT | Performed by: INTERNAL MEDICINE

## 2023-07-23 PROCEDURE — 80048 BASIC METABOLIC PNL TOTAL CA: CPT | Performed by: INTERNAL MEDICINE

## 2023-07-23 PROCEDURE — 83735 ASSAY OF MAGNESIUM: CPT | Performed by: INTERNAL MEDICINE

## 2023-07-23 PROCEDURE — G0378 HOSPITAL OBSERVATION PER HR: HCPCS

## 2023-07-23 PROCEDURE — 82948 REAGENT STRIP/BLOOD GLUCOSE: CPT

## 2023-07-23 PROCEDURE — 84100 ASSAY OF PHOSPHORUS: CPT | Performed by: INTERNAL MEDICINE

## 2023-07-23 PROCEDURE — 99232 SBSQ HOSP IP/OBS MODERATE 35: CPT | Performed by: INTERNAL MEDICINE

## 2023-07-23 PROCEDURE — 99214 OFFICE O/P EST MOD 30 MIN: CPT | Performed by: INTERNAL MEDICINE

## 2023-07-23 PROCEDURE — 85027 COMPLETE CBC AUTOMATED: CPT | Performed by: INTERNAL MEDICINE

## 2023-07-23 RX ORDER — ATORVASTATIN CALCIUM 40 MG/1
40 TABLET, FILM COATED ORAL NIGHTLY
Status: DISCONTINUED | OUTPATIENT
Start: 2023-07-23 | End: 2023-07-26 | Stop reason: HOSPADM

## 2023-07-23 RX ORDER — LISINOPRIL 10 MG/1
10 TABLET ORAL
Status: DISCONTINUED | OUTPATIENT
Start: 2023-07-23 | End: 2023-07-26 | Stop reason: HOSPADM

## 2023-07-23 RX ORDER — CARVEDILOL 6.25 MG/1
6.25 TABLET ORAL 2 TIMES DAILY WITH MEALS
Status: DISCONTINUED | OUTPATIENT
Start: 2023-07-23 | End: 2023-07-26 | Stop reason: HOSPADM

## 2023-07-23 RX ADMIN — ATORVASTATIN CALCIUM 40 MG: 40 TABLET, FILM COATED ORAL at 21:07

## 2023-07-23 RX ADMIN — LEVETIRACETAM 1000 MG: 500 TABLET, FILM COATED ORAL at 10:09

## 2023-07-23 RX ADMIN — Medication 10 ML: at 21:08

## 2023-07-23 RX ADMIN — TRAZODONE HYDROCHLORIDE 100 MG: 100 TABLET ORAL at 21:08

## 2023-07-23 RX ADMIN — POLYETHYLENE GLYCOL 3350, SODIUM SULFATE ANHYDROUS, SODIUM BICARBONATE, SODIUM CHLORIDE, POTASSIUM CHLORIDE 4000 ML: 236; 22.74; 6.74; 5.86; 2.97 POWDER, FOR SOLUTION ORAL at 11:32

## 2023-07-23 RX ADMIN — CARVEDILOL 6.25 MG: 6.25 TABLET, FILM COATED ORAL at 10:10

## 2023-07-23 RX ADMIN — SENNOSIDES AND DOCUSATE SODIUM 2 TABLET: 50; 8.6 TABLET ORAL at 10:09

## 2023-07-23 RX ADMIN — CARVEDILOL 6.25 MG: 6.25 TABLET, FILM COATED ORAL at 18:16

## 2023-07-23 RX ADMIN — LEVOTHYROXINE SODIUM 25 MCG: 0.03 TABLET ORAL at 06:19

## 2023-07-23 RX ADMIN — PANTOPRAZOLE SODIUM 40 MG: 40 INJECTION, POWDER, FOR SOLUTION INTRAVENOUS at 21:07

## 2023-07-23 RX ADMIN — LISINOPRIL 10 MG: 10 TABLET ORAL at 10:10

## 2023-07-23 RX ADMIN — AMITRIPTYLINE HYDROCHLORIDE 25 MG: 25 TABLET, FILM COATED ORAL at 21:07

## 2023-07-23 RX ADMIN — SODIUM CHLORIDE 100 ML/HR: 9 INJECTION, SOLUTION INTRAVENOUS at 05:16

## 2023-07-23 RX ADMIN — Medication 10 ML: at 08:31

## 2023-07-23 RX ADMIN — PANTOPRAZOLE SODIUM 40 MG: 40 INJECTION, POWDER, FOR SOLUTION INTRAVENOUS at 08:31

## 2023-07-23 RX ADMIN — LEVETIRACETAM 1000 MG: 500 TABLET, FILM COATED ORAL at 21:07

## 2023-07-23 NOTE — PLAN OF CARE
Goal Outcome Evaluation: Patient alert and  oriented. Received PRBC. IV fluids infusing per MAR. Call light within reach. NPO since 0000. Joanne BEE RN

## 2023-07-23 NOTE — PROGRESS NOTES
Patient is feeling about the same.  No significant complaints.  Received transfusion of 2 units of PRBCs yesterday and hemoglobin quite inappropriately increased from 6.8 to 11.4.  Patient denies any abdominal pain, nausea vomiting.  Did not have any bowel movements since admission.    On physical exam: 73-year-old woman who appears to be no acute distress.  Awake, alert and oriented x3.   Vitals:    07/23/23 0700   BP: 167/69   Pulse: 67   Resp: 16   Temp: 98.5 °F (36.9 °C)   SpO2:    Nonicteric sclera and moist oral mucosa.  Abdomen is soft, nondistended, nontender to palpation, with normoactive bowel sounds.    Labs:   Latest Reference Range & Units 07/22/23 12:19 07/23/23 04:06   WBC 3.40 - 10.80 10*3/mm3 4.76 5.49   RBC 3.77 - 5.28 10*6/mm3 2.48 (L) 4.04   Hemoglobin 12.0 - 15.9 g/dL 6.8 (C) 11.4 (L)   Hematocrit 34.0 - 46.6 % 20.9 (C) 32.5 (L)   Platelets 140 - 450 10*3/mm3 250 236   RDW 12.3 - 15.4 % 14.6 14.7   MCV 79.0 - 97.0 fL 84.3 80.4   MCH 26.6 - 33.0 pg 27.4 28.2   MCHC 31.5 - 35.7 g/dL 32.5 35.1   MPV 6.0 - 12.0 fL 9.0 9.5   RDW-SD 37.0 - 54.0 fl 43.9 43.1      Latest Reference Range & Units 07/22/23 12:19 07/22/23 18:43 07/22/23 21:42 07/23/23 04:06 07/23/23 07:43   Sodium 136 - 145 mmol/L 133 (L)   132 (L)    Potassium 3.5 - 5.2 mmol/L 3.9   4.1    Chloride 98 - 107 mmol/L 101   106    CO2 22.0 - 29.0 mmol/L 22.8   16.4 (L)    Anion Gap 5.0 - 15.0 mmol/L 9.2   9.6    BUN 8 - 23 mg/dL 15   12    Creatinine 0.57 - 1.00 mg/dL 1.05 (H)   0.86    BUN/Creatinine Ratio 7.0 - 25.0  14.3   14.0    eGFR >60.0 mL/min/1.73 55.5 (L)   70.6    Glucose 70 - 99 mg/dL 126 (H) 88 160 (H) 92 91   Calcium 8.6 - 10.5 mg/dL 8.7   8.5 (L)    Magnesium 1.6 - 2.4 mg/dL    2.0    Phosphorus 2.5 - 4.5 mg/dL    3.7    Alkaline Phosphatase 39 - 117 U/L 117       Total Protein 6.0 - 8.5 g/dL 5.6 (L)       Albumin 3.5 - 5.2 g/dL 2.9 (L)       Globulin gm/dL 2.7       A/G Ratio g/dL 1.1       AST (SGOT) 1 - 32 U/L 9       ALT  (SGPT) 1 - 33 U/L 10       Total Bilirubin 0.0 - 1.2 mg/dL 0.8           A/P: 73-year-old woman with recent history of subdural hematoma, diagnosed with left lower extremity DVT while in the rehab center and blood work revealed quite profound normocytic anemia, while stool was tested positive for occult blood.  No signs of significant overt gastrointestinal bleeding.  However, considering that patient will need to be on anticoagulation, will proceed with EGD and colonoscopy tomorrow.  Risk and benefits of this procedure discussed.  Verbal consent obtained.

## 2023-07-23 NOTE — PROGRESS NOTES
[facility]   Hospitalist Progress Note  Date: 2023  Patient Name: Rebecca Mchugh  : 1950  MRN: [id number]  Date of admission: 2023      Subjective   Subjective     Chief Complaint: Weakness    Summary: 73 y.o. female with a past medical history significant for right-sided breast cancer, hypertension, GERD, hypothyroidism, chronic pain syndrome.  The patient recently was diagnosed with a subdural hematoma (2023) status post chemical fall for which she underwent an evacuation via bur hole on .  She also sustained a proximal and distal radius/ulnar fracture.  Patient has been undergoing  rehab at a local SNF.  Patient reportedly was recently diagnosed with a left lower extremity DVT.  Prior to initiating Eliquis the patient underwent routine labs to evaluate her H&H.  She was found to have a low H&H and subsequently she was referred to the emergency department for further evaluation.  In the ED hemoglobin was noted to be 6.8 down from 8.8 1-month ago.  She was found to also be Hemoccult positive. She was admitted for further care, transfused 2 units PRBCs.  Gastroenterology consulted.  Underwent EGD and colonoscopy on , no evidence of active bleeding.  Eliquis restarted on .  If hemoglobin stable and no evidence of bleeding, can likely DC back to rehab on .  Of note, patient follows with Dr. Jamil but requested that we remain primary.    Interval Followup: No acute events overnight.  Overall continues to feel well.  Denies chest pain or shortness of air.  No further evidence of bleeding.    Objective   Objective     Vitals:   Temp:  [97 °F (36.1 °C)-99.2 °F (37.3 °C)] 97.7 °F (36.5 °C)  Heart Rate:  [65-76] 68  Resp:  [16-18] 16  BP: ()/(43-76) 160/76  Physical Exam    Constitutional: Awake, alert, no acute distress, pleasant female lying in bed   Respiratory: Clear to auscultation bilaterally, nonlabored respirations    Cardiovascular: RRR, no MRG   Gastrointestinal:  Positive bowel sounds, soft, nontender, nondistended   Neurologic: Oriented x 3, strength symmetric in all extremities, Cranial Nerves grossly intact to confrontation, speech clear    Result Review    Result Review:  I have personally reviewed the results below:  [x]  Laboratory personally reviewed CBC, BMP, magnesium  []  Microbiology  []  Radiology  [x]  EKG/Telemetry telemetry reviewed showed normal sinus rhythm  []  Cardiology/Vascular   []  Pathology  []  Old records  []  Other:  CBC          6/21/2023    06:30 7/22/2023    12:19 7/23/2023    04:06   CBC   WBC 4.28  4.76  5.49    RBC 3.05  2.48  4.04    Hemoglobin 8.8  6.8  11.4    Hematocrit 25.9  20.9  32.5    MCV 84.9  84.3  80.4    MCH 28.9  27.4  28.2    MCHC 34.0  32.5  35.1    RDW 13.1  14.6  14.7    Platelets 240  250  236      CMP          6/21/2023    06:30 7/22/2023    12:19 7/23/2023    04:06   CMP   Glucose 119  126  92    BUN 15  15  12    Creatinine 1.01  1.05  0.86    EGFR 58.2  55.5  70.6    Sodium 131  133  132    Potassium 4.2  3.9  4.1    Chloride 98  101  106    Calcium 8.8  8.7  8.5    Total Protein  5.6     Albumin  2.9     Globulin  2.7     Total Bilirubin  0.8     Alkaline Phosphatase  117     AST (SGOT)  9     ALT (SGPT)  10     Albumin/Globulin Ratio  1.1     BUN/Creatinine Ratio 14.9  14.3  14.0    Anion Gap 9.1  9.2  9.6        Assessment & Plan   Assessment / Plan     Assessment/Plan:  Acute on chronic anemia  Concern for blood loss anemia  Occult positive stools  Left lower extremity DVT  Recent subdural hematoma status post evacuation via bur hole  Proximal and distal radius/ulnar fracture  Hypertension  Hypokalemia  Diabetes mellitus type 2  Seizure disorder     Continue to monitor in the hospital for work-up and management of the above  Gastroenterology following, appreciate assistance  Hemoglobin has remained stable status post 2 units PRBCs on 7/22 without evidence of active bleeding  Underwent EGD and colonoscopy today, GI  informed that they were largely nonrevealing for source of bleeding  They are comfortable with restarting anticoagulation  Will restart Eliquis 5 mg twice daily and monitor hemoglobin closely  Transition to PPI daily  Replace potassium  Continue ppropriate home medications  Accu-Cheks will be ordered ACHS with sliding scale insulin as needed.  Trend renal function and electrolytes with a.m. BMP, magnesium   Trend Hgb and WBC with a.m. CBC     Discussed plan with RN, gastroenterology    DVT prophylaxis:  Mechanical DVT prophylaxis orders are present.    CODE STATUS:   Level Of Support Discussed With: Patient  Code Status (Patient has no pulse and is not breathing): No CPR (Do Not Attempt to Resuscitate)  Medical Interventions (Patient has pulse or is breathing): Full Support  Release to patient: Routine Release  \

## 2023-07-23 NOTE — PLAN OF CARE
Problem: Fall Injury Risk  Goal: Absence of Fall and Fall-Related Injury  Outcome: Ongoing, Progressing  Intervention: Promote Injury-Free Environment   Goal Outcome Evaluation:  Plan of Care Reviewed With: patient        Progress: no change  Outcome Evaluation: Patient has been getting up to BSC and got up in recliner this afternoon. Does well with one to two assist. All questions and concerns addressed by staff, no other issues at this time. Will continue to monitor, call light in reach.

## 2023-07-24 ENCOUNTER — ANESTHESIA (OUTPATIENT)
Dept: GASTROENTEROLOGY | Facility: HOSPITAL | Age: 73
DRG: 812 | End: 2023-07-24
Payer: MEDICARE

## 2023-07-24 ENCOUNTER — ANESTHESIA EVENT (OUTPATIENT)
Dept: GASTROENTEROLOGY | Facility: HOSPITAL | Age: 73
DRG: 812 | End: 2023-07-24
Payer: MEDICARE

## 2023-07-24 ENCOUNTER — APPOINTMENT (OUTPATIENT)
Dept: CARDIOLOGY | Facility: HOSPITAL | Age: 73
DRG: 812 | End: 2023-07-24
Payer: MEDICARE

## 2023-07-24 PROBLEM — R19.5 OCCULT BLOOD POSITIVE STOOL: Status: ACTIVE | Noted: 2023-07-22

## 2023-07-24 LAB
ANION GAP SERPL CALCULATED.3IONS-SCNC: 12.2 MMOL/L (ref 5–15)
BASOPHILS # BLD AUTO: 0.03 10*3/MM3 (ref 0–0.2)
BASOPHILS NFR BLD AUTO: 0.7 % (ref 0–1.5)
BH BB BLOOD EXPIRATION DATE: NORMAL
BH BB BLOOD EXPIRATION DATE: NORMAL
BH BB BLOOD TYPE BARCODE: 7300
BH BB BLOOD TYPE BARCODE: 7300
BH BB DISPENSE STATUS: NORMAL
BH BB DISPENSE STATUS: NORMAL
BH BB PRODUCT CODE: NORMAL
BH BB PRODUCT CODE: NORMAL
BH BB UNIT NUMBER: NORMAL
BH BB UNIT NUMBER: NORMAL
BH CV LOW VAS LEFT COMMON FEMORAL SPONT: 1
BH CV LOW VAS LEFT DISTAL FEMORAL SPONT: 1
BH CV LOW VAS LEFT EXTERNAL ILIAC AUGMENT: NORMAL
BH CV LOW VAS LEFT EXTERNAL ILIAC COMPRESS: NORMAL
BH CV LOW VAS LEFT EXTERNAL ILIAC SPONT: 1
BH CV LOW VAS LEFT EXTERNAL ILIAC THROMBUS: NORMAL
BH CV LOW VAS LEFT GASTRONEMIUS VESSEL: 1
BH CV LOW VAS LEFT GREATER SAPH AK VESSEL: 1
BH CV LOW VAS LEFT MID FEMORAL SPONT: 1
BH CV LOW VAS LEFT PERONEAL VESSEL: 1
BH CV LOW VAS LEFT POPLITEAL SPONT: 1
BH CV LOW VAS LEFT POSTERIOR TIBIAL VESSEL: 1
BH CV LOW VAS LEFT PROXIMAL FEMORAL SPONT: 1
BH CV LOW VAS RIGHT COMMON FEMORAL SPONT: 1
BH CV LOW VAS RIGHT PERONEAL VESSEL: 1
BH CV LOW VAS RIGHT POSTERIOR TIBIAL VESSEL: 1
BH CV LOW VAS RIGHT PROXIMAL FEMORAL SPONT: 1
BH CV LOWER VASCULAR LEFT COMMON FEMORAL AUGMENT: NORMAL
BH CV LOWER VASCULAR LEFT COMMON FEMORAL COMPETENT: NORMAL
BH CV LOWER VASCULAR LEFT COMMON FEMORAL COMPRESS: NORMAL
BH CV LOWER VASCULAR LEFT COMMON FEMORAL PHASIC: NORMAL
BH CV LOWER VASCULAR LEFT COMMON FEMORAL SPONT: NORMAL
BH CV LOWER VASCULAR LEFT COMMON FEMORAL THROMBUS: NORMAL
BH CV LOWER VASCULAR LEFT DISTAL FEMORAL AUGMENT: NORMAL
BH CV LOWER VASCULAR LEFT DISTAL FEMORAL COMPETENT: NORMAL
BH CV LOWER VASCULAR LEFT DISTAL FEMORAL COMPRESS: NORMAL
BH CV LOWER VASCULAR LEFT DISTAL FEMORAL PHASIC: NORMAL
BH CV LOWER VASCULAR LEFT DISTAL FEMORAL SPONT: NORMAL
BH CV LOWER VASCULAR LEFT DISTAL FEMORAL THROMBUS: NORMAL
BH CV LOWER VASCULAR LEFT EXTERNAL ILIAC COMPETENT: NORMAL
BH CV LOWER VASCULAR LEFT EXTERNAL ILIAC PHASIC: NORMAL
BH CV LOWER VASCULAR LEFT EXTERNAL ILIAC SPONT: NORMAL
BH CV LOWER VASCULAR LEFT GASTRONEMIUS COMPRESS: NORMAL
BH CV LOWER VASCULAR LEFT GASTRONEMIUS THROMBUS: NORMAL
BH CV LOWER VASCULAR LEFT GREATER SAPH AK COMPRESS: NORMAL
BH CV LOWER VASCULAR LEFT GREATER SAPH AK THROMBUS: NORMAL
BH CV LOWER VASCULAR LEFT GREATER SAPH BK COMPRESS: NORMAL
BH CV LOWER VASCULAR LEFT LESSER SAPH COMPRESS: NORMAL
BH CV LOWER VASCULAR LEFT MID FEMORAL AUGMENT: NORMAL
BH CV LOWER VASCULAR LEFT MID FEMORAL COMPETENT: NORMAL
BH CV LOWER VASCULAR LEFT MID FEMORAL COMPRESS: NORMAL
BH CV LOWER VASCULAR LEFT MID FEMORAL PHASIC: NORMAL
BH CV LOWER VASCULAR LEFT MID FEMORAL SPONT: NORMAL
BH CV LOWER VASCULAR LEFT MID FEMORAL THROMBUS: NORMAL
BH CV LOWER VASCULAR LEFT PERONEAL COMPRESS: NORMAL
BH CV LOWER VASCULAR LEFT PERONEAL THROMBUS: NORMAL
BH CV LOWER VASCULAR LEFT POPLITEAL AUGMENT: NORMAL
BH CV LOWER VASCULAR LEFT POPLITEAL COMPETENT: NORMAL
BH CV LOWER VASCULAR LEFT POPLITEAL COMPRESS: NORMAL
BH CV LOWER VASCULAR LEFT POPLITEAL PHASIC: NORMAL
BH CV LOWER VASCULAR LEFT POPLITEAL SPONT: NORMAL
BH CV LOWER VASCULAR LEFT POPLITEAL THROMBUS: NORMAL
BH CV LOWER VASCULAR LEFT POSTERIOR TIBIAL COMPRESS: NORMAL
BH CV LOWER VASCULAR LEFT POSTERIOR TIBIAL THROMBUS: NORMAL
BH CV LOWER VASCULAR LEFT PROXIMAL FEMORAL AUGMENT: NORMAL
BH CV LOWER VASCULAR LEFT PROXIMAL FEMORAL COMPETENT: NORMAL
BH CV LOWER VASCULAR LEFT PROXIMAL FEMORAL COMPRESS: NORMAL
BH CV LOWER VASCULAR LEFT PROXIMAL FEMORAL PHASIC: NORMAL
BH CV LOWER VASCULAR LEFT PROXIMAL FEMORAL SPONT: NORMAL
BH CV LOWER VASCULAR LEFT PROXIMAL FEMORAL THROMBUS: NORMAL
BH CV LOWER VASCULAR RIGHT COMMON FEMORAL AUGMENT: NORMAL
BH CV LOWER VASCULAR RIGHT COMMON FEMORAL COMPETENT: NORMAL
BH CV LOWER VASCULAR RIGHT COMMON FEMORAL COMPRESS: NORMAL
BH CV LOWER VASCULAR RIGHT COMMON FEMORAL PHASIC: NORMAL
BH CV LOWER VASCULAR RIGHT COMMON FEMORAL SPONT: NORMAL
BH CV LOWER VASCULAR RIGHT COMMON FEMORAL THROMBUS: NORMAL
BH CV LOWER VASCULAR RIGHT DISTAL FEMORAL COMPRESS: NORMAL
BH CV LOWER VASCULAR RIGHT EXTERNAL ILIAC AUGMENT: NORMAL
BH CV LOWER VASCULAR RIGHT EXTERNAL ILIAC COMPETENT: NORMAL
BH CV LOWER VASCULAR RIGHT EXTERNAL ILIAC COMPRESS: NORMAL
BH CV LOWER VASCULAR RIGHT EXTERNAL ILIAC PHASIC: NORMAL
BH CV LOWER VASCULAR RIGHT EXTERNAL ILIAC SPONT: NORMAL
BH CV LOWER VASCULAR RIGHT EXTERNAL ILIAC THROMBUS: NORMAL
BH CV LOWER VASCULAR RIGHT GASTRONEMIUS COMPRESS: NORMAL
BH CV LOWER VASCULAR RIGHT GREATER SAPH AK COMPRESS: NORMAL
BH CV LOWER VASCULAR RIGHT GREATER SAPH BK COMPRESS: NORMAL
BH CV LOWER VASCULAR RIGHT LESSER SAPH COMPRESS: NORMAL
BH CV LOWER VASCULAR RIGHT MID FEMORAL AUGMENT: NORMAL
BH CV LOWER VASCULAR RIGHT MID FEMORAL COMPETENT: NORMAL
BH CV LOWER VASCULAR RIGHT MID FEMORAL COMPRESS: NORMAL
BH CV LOWER VASCULAR RIGHT MID FEMORAL PHASIC: NORMAL
BH CV LOWER VASCULAR RIGHT MID FEMORAL SPONT: NORMAL
BH CV LOWER VASCULAR RIGHT PERONEAL COMPRESS: NORMAL
BH CV LOWER VASCULAR RIGHT PERONEAL THROMBUS: NORMAL
BH CV LOWER VASCULAR RIGHT POPLITEAL AUGMENT: NORMAL
BH CV LOWER VASCULAR RIGHT POPLITEAL COMPETENT: NORMAL
BH CV LOWER VASCULAR RIGHT POPLITEAL COMPRESS: NORMAL
BH CV LOWER VASCULAR RIGHT POPLITEAL PHASIC: NORMAL
BH CV LOWER VASCULAR RIGHT POPLITEAL SPONT: NORMAL
BH CV LOWER VASCULAR RIGHT POSTERIOR TIBIAL COMPRESS: NORMAL
BH CV LOWER VASCULAR RIGHT POSTERIOR TIBIAL THROMBUS: NORMAL
BH CV LOWER VASCULAR RIGHT PROXIMAL FEMORAL AUGMENT: NORMAL
BH CV LOWER VASCULAR RIGHT PROXIMAL FEMORAL COMPETENT: NORMAL
BH CV LOWER VASCULAR RIGHT PROXIMAL FEMORAL COMPRESS: NORMAL
BH CV LOWER VASCULAR RIGHT PROXIMAL FEMORAL PHASIC: NORMAL
BH CV LOWER VASCULAR RIGHT PROXIMAL FEMORAL SPONT: NORMAL
BH CV LOWER VASCULAR RIGHT PROXIMAL FEMORAL THROMBUS: NORMAL
BUN SERPL-MCNC: 8 MG/DL (ref 8–23)
BUN/CREAT SERPL: 10.4 (ref 7–25)
CALCIUM SPEC-SCNC: 8.3 MG/DL (ref 8.6–10.5)
CHLORIDE SERPL-SCNC: 101 MMOL/L (ref 98–107)
CO2 SERPL-SCNC: 17.8 MMOL/L (ref 22–29)
CREAT SERPL-MCNC: 0.77 MG/DL (ref 0.57–1)
CROSSMATCH INTERPRETATION: NORMAL
CROSSMATCH INTERPRETATION: NORMAL
DEPRECATED RDW RBC AUTO: 48.7 FL (ref 37–54)
EGFRCR SERPLBLD CKD-EPI 2021: 80.6 ML/MIN/1.73
EOSINOPHIL # BLD AUTO: 0.27 10*3/MM3 (ref 0–0.4)
EOSINOPHIL NFR BLD AUTO: 6 % (ref 0.3–6.2)
ERYTHROCYTE [DISTWIDTH] IN BLOOD BY AUTOMATED COUNT: 14.8 % (ref 12.3–15.4)
GLUCOSE BLDC GLUCOMTR-MCNC: 111 MG/DL (ref 70–99)
GLUCOSE BLDC GLUCOMTR-MCNC: 84 MG/DL (ref 70–99)
GLUCOSE SERPL-MCNC: 100 MG/DL (ref 65–99)
HCT VFR BLD AUTO: 36 % (ref 34–46.6)
HGB BLD-MCNC: 11.2 G/DL (ref 12–15.9)
IMM GRANULOCYTES # BLD AUTO: 0.02 10*3/MM3 (ref 0–0.05)
IMM GRANULOCYTES NFR BLD AUTO: 0.4 % (ref 0–0.5)
LYMPHOCYTES # BLD AUTO: 0.98 10*3/MM3 (ref 0.7–3.1)
LYMPHOCYTES NFR BLD AUTO: 21.9 % (ref 19.6–45.3)
MAGNESIUM SERPL-MCNC: 1.9 MG/DL (ref 1.6–2.4)
MCH RBC QN AUTO: 27.8 PG (ref 26.6–33)
MCHC RBC AUTO-ENTMCNC: 31.1 G/DL (ref 31.5–35.7)
MCV RBC AUTO: 89.3 FL (ref 79–97)
MONOCYTES # BLD AUTO: 0.51 10*3/MM3 (ref 0.1–0.9)
MONOCYTES NFR BLD AUTO: 11.4 % (ref 5–12)
NEUTROPHILS NFR BLD AUTO: 2.66 10*3/MM3 (ref 1.7–7)
NEUTROPHILS NFR BLD AUTO: 59.6 % (ref 42.7–76)
NRBC BLD AUTO-RTO: 0 /100 WBC (ref 0–0.2)
PHOSPHATE SERPL-MCNC: 2.6 MG/DL (ref 2.5–4.5)
PLATELET # BLD AUTO: 226 10*3/MM3 (ref 140–450)
PMV BLD AUTO: 9 FL (ref 6–12)
POTASSIUM SERPL-SCNC: 3.2 MMOL/L (ref 3.5–5.2)
RBC # BLD AUTO: 4.03 10*6/MM3 (ref 3.77–5.28)
SODIUM SERPL-SCNC: 131 MMOL/L (ref 136–145)
UNIT  ABO: NORMAL
UNIT  ABO: NORMAL
UNIT  RH: NORMAL
UNIT  RH: NORMAL
WBC NRBC COR # BLD: 4.47 10*3/MM3 (ref 3.4–10.8)

## 2023-07-24 PROCEDURE — G0378 HOSPITAL OBSERVATION PER HR: HCPCS

## 2023-07-24 PROCEDURE — 93971 EXTREMITY STUDY: CPT

## 2023-07-24 PROCEDURE — 99232 SBSQ HOSP IP/OBS MODERATE 35: CPT | Performed by: INTERNAL MEDICINE

## 2023-07-24 PROCEDURE — 93970 EXTREMITY STUDY: CPT | Performed by: SURGERY

## 2023-07-24 PROCEDURE — 80048 BASIC METABOLIC PNL TOTAL CA: CPT | Performed by: INTERNAL MEDICINE

## 2023-07-24 PROCEDURE — 0DJ08ZZ INSPECTION OF UPPER INTESTINAL TRACT, VIA NATURAL OR ARTIFICIAL OPENING ENDOSCOPIC: ICD-10-PCS | Performed by: INTERNAL MEDICINE

## 2023-07-24 PROCEDURE — 93970 EXTREMITY STUDY: CPT

## 2023-07-24 PROCEDURE — 85025 COMPLETE CBC W/AUTO DIFF WBC: CPT | Performed by: INTERNAL MEDICINE

## 2023-07-24 PROCEDURE — 45378 DIAGNOSTIC COLONOSCOPY: CPT | Performed by: INTERNAL MEDICINE

## 2023-07-24 PROCEDURE — 25010000002 PROPOFOL 10 MG/ML EMULSION: Performed by: NURSE ANESTHETIST, CERTIFIED REGISTERED

## 2023-07-24 PROCEDURE — 83735 ASSAY OF MAGNESIUM: CPT | Performed by: INTERNAL MEDICINE

## 2023-07-24 PROCEDURE — 0DJD8ZZ INSPECTION OF LOWER INTESTINAL TRACT, VIA NATURAL OR ARTIFICIAL OPENING ENDOSCOPIC: ICD-10-PCS | Performed by: INTERNAL MEDICINE

## 2023-07-24 PROCEDURE — 43235 EGD DIAGNOSTIC BRUSH WASH: CPT | Performed by: INTERNAL MEDICINE

## 2023-07-24 PROCEDURE — 84100 ASSAY OF PHOSPHORUS: CPT | Performed by: INTERNAL MEDICINE

## 2023-07-24 PROCEDURE — 82948 REAGENT STRIP/BLOOD GLUCOSE: CPT

## 2023-07-24 RX ORDER — SODIUM CHLORIDE, SODIUM LACTATE, POTASSIUM CHLORIDE, CALCIUM CHLORIDE 600; 310; 30; 20 MG/100ML; MG/100ML; MG/100ML; MG/100ML
30 INJECTION, SOLUTION INTRAVENOUS CONTINUOUS
Status: DISCONTINUED | OUTPATIENT
Start: 2023-07-24 | End: 2023-07-26

## 2023-07-24 RX ORDER — PROPOFOL 10 MG/ML
VIAL (ML) INTRAVENOUS AS NEEDED
Status: DISCONTINUED | OUTPATIENT
Start: 2023-07-24 | End: 2023-07-24 | Stop reason: SURG

## 2023-07-24 RX ORDER — SODIUM CHLORIDE, SODIUM LACTATE, POTASSIUM CHLORIDE, CALCIUM CHLORIDE 600; 310; 30; 20 MG/100ML; MG/100ML; MG/100ML; MG/100ML
INJECTION, SOLUTION INTRAVENOUS CONTINUOUS PRN
Status: DISCONTINUED | OUTPATIENT
Start: 2023-07-24 | End: 2023-07-24 | Stop reason: SURG

## 2023-07-24 RX ORDER — LIDOCAINE HYDROCHLORIDE 20 MG/ML
INJECTION, SOLUTION EPIDURAL; INFILTRATION; INTRACAUDAL; PERINEURAL AS NEEDED
Status: DISCONTINUED | OUTPATIENT
Start: 2023-07-24 | End: 2023-07-24 | Stop reason: SURG

## 2023-07-24 RX ORDER — PANTOPRAZOLE SODIUM 40 MG/1
40 TABLET, DELAYED RELEASE ORAL
Status: DISCONTINUED | OUTPATIENT
Start: 2023-07-25 | End: 2023-07-26 | Stop reason: HOSPADM

## 2023-07-24 RX ORDER — POTASSIUM CHLORIDE 750 MG/1
40 CAPSULE, EXTENDED RELEASE ORAL 2 TIMES DAILY WITH MEALS
Status: COMPLETED | OUTPATIENT
Start: 2023-07-24 | End: 2023-07-24

## 2023-07-24 RX ADMIN — APIXABAN 5 MG: 5 TABLET, FILM COATED ORAL at 21:22

## 2023-07-24 RX ADMIN — PANTOPRAZOLE SODIUM 40 MG: 40 INJECTION, POWDER, FOR SOLUTION INTRAVENOUS at 08:08

## 2023-07-24 RX ADMIN — LIDOCAINE HYDROCHLORIDE 100 MG: 20 INJECTION, SOLUTION EPIDURAL; INFILTRATION; INTRACAUDAL; PERINEURAL at 11:53

## 2023-07-24 RX ADMIN — Medication 10 ML: at 21:24

## 2023-07-24 RX ADMIN — POTASSIUM CHLORIDE 40 MEQ: 750 CAPSULE, EXTENDED RELEASE ORAL at 17:59

## 2023-07-24 RX ADMIN — PROPOFOL 200 MCG/KG/MIN: 10 INJECTION, EMULSION INTRAVENOUS at 11:53

## 2023-07-24 RX ADMIN — APIXABAN 5 MG: 5 TABLET, FILM COATED ORAL at 14:52

## 2023-07-24 RX ADMIN — PROPOFOL 50 MG: 10 INJECTION, EMULSION INTRAVENOUS at 11:53

## 2023-07-24 RX ADMIN — ATORVASTATIN CALCIUM 40 MG: 40 TABLET, FILM COATED ORAL at 21:22

## 2023-07-24 RX ADMIN — SODIUM CHLORIDE, POTASSIUM CHLORIDE, SODIUM LACTATE AND CALCIUM CHLORIDE: 600; 310; 30; 20 INJECTION, SOLUTION INTRAVENOUS at 11:47

## 2023-07-24 RX ADMIN — AMITRIPTYLINE HYDROCHLORIDE 25 MG: 25 TABLET, FILM COATED ORAL at 21:22

## 2023-07-24 RX ADMIN — SENNOSIDES AND DOCUSATE SODIUM 2 TABLET: 50; 8.6 TABLET ORAL at 21:22

## 2023-07-24 RX ADMIN — POTASSIUM CHLORIDE 40 MEQ: 750 CAPSULE, EXTENDED RELEASE ORAL at 14:51

## 2023-07-24 RX ADMIN — CARVEDILOL 6.25 MG: 6.25 TABLET, FILM COATED ORAL at 17:59

## 2023-07-24 RX ADMIN — TRAZODONE HYDROCHLORIDE 100 MG: 100 TABLET ORAL at 21:23

## 2023-07-24 RX ADMIN — LEVETIRACETAM 1000 MG: 500 TABLET, FILM COATED ORAL at 21:23

## 2023-07-24 NOTE — PROGRESS NOTES
[facility]  Inpatient Progress Note    Reason for Follow Up:  BONG    Subjective     Interval History:   Pt presents to endoscopy for further evaluation of iron deficiency anemia.    Current Facility-Administered Medications:     amitriptyline (ELAVIL) tablet 25 mg, 25 mg, Oral, Nightly, Leanne Henderson MD, 25 mg at 07/23/23 2107    atorvastatin (LIPITOR) tablet 40 mg, 40 mg, Oral, Nightly, Marvin Henderson MD, 40 mg at 07/23/23 2107    sennosides-docusate (PERICOLACE) 8.6-50 MG per tablet 2 tablet, 2 tablet, Oral, BID, 2 tablet at 07/23/23 1009 **AND** polyethylene glycol (MIRALAX) packet 17 g, 17 g, Oral, Daily PRN **AND** bisacodyl (DULCOLAX) EC tablet 5 mg, 5 mg, Oral, Daily PRN, Leanne Henderson MD    carvedilol (COREG) tablet 6.25 mg, 6.25 mg, Oral, BID With Meals, Marvin Henderson MD, 6.25 mg at 07/23/23 1816    cyclobenzaprine (FLEXERIL) tablet 10 mg, 10 mg, Oral, TID PRN, Leanne Henderson MD    dextrose (D50W) (25 g/50 mL) IV injection 25 g, 25 g, Intravenous, Q15 Min PRN, Leanne Henderson MD    dextrose (GLUTOSE) oral gel 15 g, 15 g, Oral, Q15 Min PRN, Leanne Henderson MD    glucagon (GLUCAGEN) injection 1 mg, 1 mg, Intramuscular, Q15 Min PRN, Leanne Henderson MD    HYDROcodone-acetaminophen (NORCO) 7.5-325 MG per tablet 1 tablet, 1 tablet, Oral, Q6H PRN, Leanne Henderson MD    Insulin Lispro (humaLOG) injection 2-7 Units, 2-7 Units, Subcutaneous, 4x Daily AC & at Bedtime, Leanne Henderson MD, 2 Units at 07/22/23 2151    levETIRAcetam (KEPPRA) tablet 1,000 mg, 1,000 mg, Oral, Q12H, Leanne Henderson MD, 1,000 mg at 07/23/23 2107    levothyroxine (SYNTHROID, LEVOTHROID) tablet 25 mcg, 25 mcg, Oral, Q AM, Leanne Henderson MD, 25 mcg at 07/23/23 0619    lisinopril (PRINIVIL,ZESTRIL) tablet 10 mg, 10 mg, Oral, Q24H, Marvin Henderson MD, 10 mg at 07/23/23 1010    melatonin tablet 5 mg, 5 mg, Oral, Nightly PRN,  Leanne Henderson MD    morphine injection 1 mg, 1 mg, Intravenous, Q4H PRN **AND** naloxone (NARCAN) injection 0.4 mg, 0.4 mg, Intravenous, Q5 Min PRN, Leanne Henderson MD    nitroglycerin (NITROSTAT) SL tablet 0.4 mg, 0.4 mg, Sublingual, Q5 Min PRN, Leanne Henderson MD    ondansetron (ZOFRAN) injection 4 mg, 4 mg, Intravenous, Q6H PRN, Leanne Henderson MD    pantoprazole (PROTONIX) injection 40 mg, 40 mg, Intravenous, Q12H, Leanne Henderson MD, 40 mg at 07/24/23 0808    potassium chloride (MICRO-K) CR capsule 40 mEq, 40 mEq, Oral, BID With Meals, Marvin Henderson MD    sodium chloride 0.9 % flush 10 mL, 10 mL, Intravenous, Q12H, Leanne Henderson MD, 10 mL at 07/23/23 2108    sodium chloride 0.9 % flush 10 mL, 10 mL, Intravenous, PRN, Leanne Henderson MD    sodium chloride 0.9 % infusion 40 mL, 40 mL, Intravenous, PRN, Leanne Henderson MD    traZODone (DESYREL) tablet 100 mg, 100 mg, Oral, Nightly, Leanne Henderson MD, 100 mg at 07/23/23 2108  Review of Systems:    The following systems were reviewed and negative;  constitution, respiratory, and cardiovascular    Objective     Vital Signs  Temp:  [97.3 °F (36.3 °C)-98.2 °F (36.8 °C)] 98.2 °F (36.8 °C)  Heart Rate:  [67-75] 74  Resp:  [16-18] 18  BP: (133-161)/(57-76) 161/70  Body mass index is 24.71 kg/m².    Intake/Output Summary (Last 24 hours) at 7/24/2023 1102  Last data filed at 7/23/2023 1700  Gross per 24 hour   Intake 600 ml   Output 1400 ml   Net -800 ml     No intake/output data recorded.     Physical Exam:   General: awake, alert and in no acute distress   Eyes: eyes move symmetrical in all directions, no scleral icterus   Neck: supple, trachea is midline   Skin: warm and dry, not jaundiced   Cardiovascular: no chest tenderness   Pulm: breathing unlabored   Abdomen: soft, nontender, nondistended   Rectal: deferred   Extremities: no rash or edema   Psychiatric: mental status within normal  limits     Results Review:     I reviewed the patient's new clinical results.    Results from last 7 days   Lab Units 07/24/23  0402 07/23/23  0406 07/22/23  1219   WBC 10*3/mm3 4.47 5.49 4.76   HEMOGLOBIN g/dL 11.2* 11.4* 6.8*   HEMATOCRIT % 36.0 32.5* 20.9*   PLATELETS 10*3/mm3 226 236 250     Results from last 7 days   Lab Units 07/24/23  0402 07/23/23  0406 07/22/23  1219   SODIUM mmol/L 131* 132* 133*   POTASSIUM mmol/L 3.2* 4.1 3.9   CHLORIDE mmol/L 101 106 101   CO2 mmol/L 17.8* 16.4* 22.8   BUN mg/dL 8 12 15   CREATININE mg/dL 0.77 0.86 1.05*   CALCIUM mg/dL 8.3* 8.5* 8.7   BILIRUBIN mg/dL  --   --  0.8   ALK PHOS U/L  --   --  117   ALT (SGPT) U/L  --   --  10   AST (SGOT) U/L  --   --  9   GLUCOSE mg/dL 100* 92 126*     Results from last 7 days   Lab Units 07/22/23  1219   INR  1.21*     Lab Results   Lab Value Date/Time    LIPASE 30 09/26/2019 2202       Radiology:              Assessment & Plan   Assessment:     Iron deficiency anemia    Plan:     - will proceed with EGD/colonoscopy as planned  - benefits vs risks of procedure d/w patient; risks include but are not limited to bleeding, infection, perforation, and risk of sedation  - pt understands risks and agrees to proceed    I discussed the patients findings and my recommendations with patient.         Lee Ann Rios M.D.  [place] [address] [place]  [id number]  Office: [phone]

## 2023-07-24 NOTE — PLAN OF CARE
Goal Outcome Evaluation:  Plan of Care Reviewed With: patient        Progress: no change  Outcome Evaluation: Patient has been using BSC. Strict NPO after midnight per order. Consent obtained for procedure. Will continue to monitor.

## 2023-07-24 NOTE — ANESTHESIA PREPROCEDURE EVALUATION
Anesthesia Evaluation     Patient summary reviewed and Nursing notes reviewed   no history of anesthetic complications:   NPO Solid Status: > 8 hours  NPO Liquid Status: > 2 hours           Airway   Mallampati: II  TM distance: >3 FB  Neck ROM: full  No difficulty expected  Dental    (+) edentulous    Pulmonary - normal exam    breath sounds clear to auscultation  (+) a smoker Former,  Cardiovascular - normal exam  Exercise tolerance: good (4-7 METS)    Patient on routine beta blocker and Beta blocker given within 24 hours of surgery  Rhythm: regular  Rate: normal    (+) hypertensionDVT, hyperlipidemia      Neuro/Psych  (+) seizures  GI/Hepatic/Renal/Endo    (+) GERD, renal disease stones, diabetes mellitus, thyroid problem hypothyroidism    Musculoskeletal (-) negative ROS    Abdominal    Substance History - negative use     OB/GYN negative ob/gyn ROS         Other      history of cancer (Breast)    ROS/Med Hx Other: PAT Nursing Notes unavailable.                   Anesthesia Plan    ASA 4     general and MAC     (Patient understands anesthesia not responsible for dental damage.)  intravenous induction     Anesthetic plan, risks, benefits, and alternatives have been provided, discussed and informed consent has been obtained with: patient.    Use of blood products discussed with patient .    Plan discussed with CRNA.    CODE STATUS:    While under anesthesia and immediate perioperative period:  Code Status: CPR - Full Support

## 2023-07-24 NOTE — ANESTHESIA POSTPROCEDURE EVALUATION
Patient: Rebecca Mchugh    Procedure Summary       Date: 07/24/23 Room / Location: [facility] ENDOSCOPY 1 / [facility] ENDOSCOPY    Anesthesia Start: 1147 Anesthesia Stop: 1224    Procedures:       ESOPHAGOGASTRODUODENOSCOPY      COLONOSCOPY Diagnosis:       Occult blood positive stool      Acute on chronic anemia      (Occult blood positive stool [R19.5])      (Acute on chronic anemia [D64.9])    Surgeons: Lee Ann Rios MD Provider: Delia Bedolla MD    Anesthesia Type: general, MAC ASA Status: 4            Anesthesia Type: general, MAC    Vitals  Vitals Value Taken Time   /59 07/24/23 1235   Temp 36 °C (96.8 °F) 07/24/23 1225   Pulse 67 07/24/23 1235   Resp 20 07/24/23 1235   SpO2 99 % 07/24/23 1235           Post Anesthesia Care and Evaluation    Patient location during evaluation: bedside  Patient participation: complete - patient participated  Level of consciousness: awake  Pain management: adequate    Airway patency: patent  PONV Status: none  Cardiovascular status: acceptable and stable  Respiratory status: acceptable  Hydration status: acceptable    Comments: An Anesthesiologist personally participated in the most demanding procedures (including induction and emergence if applicable) in the anesthesia plan, monitored the course of anesthesia administration at frequent intervals and remained physically present and available for immediate diagnosis and treatment of emergencies.

## 2023-07-25 ENCOUNTER — TELEPHONE (OUTPATIENT)
Dept: GASTROENTEROLOGY | Facility: CLINIC | Age: 73
End: 2023-07-25
Payer: MEDICARE

## 2023-07-25 LAB
ANION GAP SERPL CALCULATED.3IONS-SCNC: 9.7 MMOL/L (ref 5–15)
BASOPHILS # BLD AUTO: 0.05 10*3/MM3 (ref 0–0.2)
BASOPHILS NFR BLD AUTO: 1.1 % (ref 0–1.5)
BUN SERPL-MCNC: 8 MG/DL (ref 8–23)
BUN/CREAT SERPL: 9.5 (ref 7–25)
CALCIUM SPEC-SCNC: 8.4 MG/DL (ref 8.6–10.5)
CHLORIDE SERPL-SCNC: 104 MMOL/L (ref 98–107)
CO2 SERPL-SCNC: 21.3 MMOL/L (ref 22–29)
CREAT SERPL-MCNC: 0.84 MG/DL (ref 0.57–1)
DEPRECATED RDW RBC AUTO: 46.5 FL (ref 37–54)
EGFRCR SERPLBLD CKD-EPI 2021: 72.6 ML/MIN/1.73
EOSINOPHIL # BLD AUTO: 0.29 10*3/MM3 (ref 0–0.4)
EOSINOPHIL NFR BLD AUTO: 6.4 % (ref 0.3–6.2)
ERYTHROCYTE [DISTWIDTH] IN BLOOD BY AUTOMATED COUNT: 15 % (ref 12.3–15.4)
GLUCOSE BLDC GLUCOMTR-MCNC: 89 MG/DL (ref 70–99)
GLUCOSE BLDC GLUCOMTR-MCNC: 91 MG/DL (ref 70–99)
GLUCOSE BLDC GLUCOMTR-MCNC: 93 MG/DL (ref 70–99)
GLUCOSE BLDC GLUCOMTR-MCNC: 99 MG/DL (ref 70–99)
GLUCOSE SERPL-MCNC: 94 MG/DL (ref 65–99)
HCT VFR BLD AUTO: 35.4 % (ref 34–46.6)
HGB BLD-MCNC: 11.5 G/DL (ref 12–15.9)
IMM GRANULOCYTES # BLD AUTO: 0.01 10*3/MM3 (ref 0–0.05)
IMM GRANULOCYTES NFR BLD AUTO: 0.2 % (ref 0–0.5)
LYMPHOCYTES # BLD AUTO: 1.15 10*3/MM3 (ref 0.7–3.1)
LYMPHOCYTES NFR BLD AUTO: 25.5 % (ref 19.6–45.3)
MAGNESIUM SERPL-MCNC: 1.7 MG/DL (ref 1.6–2.4)
MCH RBC QN AUTO: 27.6 PG (ref 26.6–33)
MCHC RBC AUTO-ENTMCNC: 32.5 G/DL (ref 31.5–35.7)
MCV RBC AUTO: 85.1 FL (ref 79–97)
MONOCYTES # BLD AUTO: 0.67 10*3/MM3 (ref 0.1–0.9)
MONOCYTES NFR BLD AUTO: 14.9 % (ref 5–12)
NEUTROPHILS NFR BLD AUTO: 2.34 10*3/MM3 (ref 1.7–7)
NEUTROPHILS NFR BLD AUTO: 51.9 % (ref 42.7–76)
NRBC BLD AUTO-RTO: 0 /100 WBC (ref 0–0.2)
PHOSPHATE SERPL-MCNC: 3 MG/DL (ref 2.5–4.5)
PLATELET # BLD AUTO: 259 10*3/MM3 (ref 140–450)
PMV BLD AUTO: 8.8 FL (ref 6–12)
POTASSIUM SERPL-SCNC: 4.5 MMOL/L (ref 3.5–5.2)
RBC # BLD AUTO: 4.16 10*6/MM3 (ref 3.77–5.28)
SODIUM SERPL-SCNC: 135 MMOL/L (ref 136–145)
WBC NRBC COR # BLD: 4.51 10*3/MM3 (ref 3.4–10.8)

## 2023-07-25 PROCEDURE — 85025 COMPLETE CBC W/AUTO DIFF WBC: CPT | Performed by: INTERNAL MEDICINE

## 2023-07-25 PROCEDURE — 83735 ASSAY OF MAGNESIUM: CPT | Performed by: INTERNAL MEDICINE

## 2023-07-25 PROCEDURE — 97161 PT EVAL LOW COMPLEX 20 MIN: CPT

## 2023-07-25 PROCEDURE — 99233 SBSQ HOSP IP/OBS HIGH 50: CPT | Performed by: INTERNAL MEDICINE

## 2023-07-25 PROCEDURE — 80048 BASIC METABOLIC PNL TOTAL CA: CPT | Performed by: INTERNAL MEDICINE

## 2023-07-25 PROCEDURE — 82948 REAGENT STRIP/BLOOD GLUCOSE: CPT

## 2023-07-25 PROCEDURE — 84100 ASSAY OF PHOSPHORUS: CPT | Performed by: INTERNAL MEDICINE

## 2023-07-25 RX ADMIN — ATORVASTATIN CALCIUM 40 MG: 40 TABLET, FILM COATED ORAL at 20:07

## 2023-07-25 RX ADMIN — SODIUM CHLORIDE, POTASSIUM CHLORIDE, SODIUM LACTATE AND CALCIUM CHLORIDE 30 ML/HR: 600; 310; 30; 20 INJECTION, SOLUTION INTRAVENOUS at 06:09

## 2023-07-25 RX ADMIN — AMITRIPTYLINE HYDROCHLORIDE 25 MG: 25 TABLET, FILM COATED ORAL at 20:07

## 2023-07-25 RX ADMIN — Medication 10 ML: at 20:07

## 2023-07-25 RX ADMIN — TRAZODONE HYDROCHLORIDE 100 MG: 100 TABLET ORAL at 20:07

## 2023-07-25 RX ADMIN — CARVEDILOL 6.25 MG: 6.25 TABLET, FILM COATED ORAL at 17:13

## 2023-07-25 RX ADMIN — APIXABAN 5 MG: 5 TABLET, FILM COATED ORAL at 09:05

## 2023-07-25 RX ADMIN — PANTOPRAZOLE SODIUM 40 MG: 40 TABLET, DELAYED RELEASE ORAL at 06:09

## 2023-07-25 RX ADMIN — APIXABAN 5 MG: 5 TABLET, FILM COATED ORAL at 20:06

## 2023-07-25 RX ADMIN — LEVOTHYROXINE SODIUM 25 MCG: 0.03 TABLET ORAL at 06:09

## 2023-07-25 RX ADMIN — Medication 10 ML: at 09:05

## 2023-07-25 RX ADMIN — LEVETIRACETAM 1000 MG: 500 TABLET, FILM COATED ORAL at 09:05

## 2023-07-25 RX ADMIN — CARVEDILOL 6.25 MG: 6.25 TABLET, FILM COATED ORAL at 09:05

## 2023-07-25 RX ADMIN — LISINOPRIL 10 MG: 10 TABLET ORAL at 09:05

## 2023-07-25 RX ADMIN — LEVETIRACETAM 1000 MG: 500 TABLET, FILM COATED ORAL at 20:06

## 2023-07-25 NOTE — PLAN OF CARE
Goal Outcome Evaluation:  Plan of Care Reviewed With: patient           Outcome Evaluation: Pt presents with decreased transfers and ambulaiton.  Skilled PT services will be required to address her mobiltiy deficits      Anticipated Discharge Disposition (PT): sub acute care setting

## 2023-07-25 NOTE — PROGRESS NOTES
[facility]   Hospitalist Progress Note  Date: 2023  Patient Name: Rebecca Mchugh  : 1950  MRN: [id number]  Date of admission: 2023      Subjective   Subjective     Chief Complaint: Weakness    Summary: 73 y.o. female with a past medical history significant for right-sided breast cancer, hypertension, GERD, hypothyroidism, chronic pain syndrome.  The patient recently was diagnosed with a subdural hematoma (2023) status post chemical fall for which she underwent an evacuation via bur hole on .  She also sustained a proximal and distal radius/ulnar fracture.  Patient has been undergoing  rehab at a local SNF.  Patient reportedly was recently diagnosed with a left lower extremity DVT.  Prior to initiating Eliquis the patient underwent routine labs to evaluate her H&H.  She was found to have a low H&H and subsequently she was referred to the emergency department for further evaluation.  In the ED hemoglobin was noted to be 6.8 down from 8.8 1-month ago.  She was found to also be Hemoccult positive. She was admitted for further care, transfused 2 units PRBCs.  Gastroenterology consulted.  Underwent EGD and colonoscopy on , no evidence of active bleeding.  Eliquis restarted on .  If hemoglobin stable and no evidence of bleeding, can likely DC back to rehab on .  Of note, patient follows with Dr. Jamil but requested that we remain primary.    Interval Followup: No acute events overnight, no bloody bowel movements, patient started back on Eliquis yesterday    Objective   Objective     Vitals:   Temp:  [96.8 °F (36 °C)-98.6 °F (37 °C)] 98.6 °F (37 °C)  Heart Rate:  [67-93] 75  Resp:  [15-20] 16  BP: ()/(44-99) 130/44  Physical Exam   GEN: No acute distress  HEENT: Moist mucous membranes  LUNGS: Equal chest rise bilaterally  CARDIAC: Regular rate and rhythm  NEURO: Moving all 4 extremities spontaneously  SKIN: No obvious breakdown    Result Review    Result Review:  I have  personally reviewed the results below:  [x]  Laboratory personally reviewed CBC, BMP, magnesium  []  Microbiology  []  Radiology  [x]  EKG/Telemetry  []  Cardiology/Vascular   []  Pathology  []  Old records  []  Other:  CBC          7/23/2023    04:06 7/24/2023    04:02 7/25/2023    05:00   CBC   WBC 5.49  4.47  4.51    RBC 4.04  4.03  4.16    Hemoglobin 11.4  11.2  11.5    Hematocrit 32.5  36.0  35.4    MCV 80.4  89.3  85.1    MCH 28.2  27.8  27.6    MCHC 35.1  31.1  32.5    RDW 14.7  14.8  15.0    Platelets 236  226  259      CMP          7/23/2023    04:06 7/24/2023    04:02 7/25/2023    05:00   CMP   Glucose 92  100  94    BUN 12  8  8    Creatinine 0.86  0.77  0.84    EGFR 70.6  80.6  72.6    Sodium 132  131  135    Potassium 4.1  3.2  4.5    Chloride 106  101  104    Calcium 8.5  8.3  8.4    BUN/Creatinine Ratio 14.0  10.4  9.5    Anion Gap 9.6  12.2  9.7      Assessment & Plan   Assessment / Plan     Assessment/Plan:  Acute on chronic anemia  Concern for blood loss anemia  Occult positive stools  Left lower extremity DVT  Recent subdural hematoma status post evacuation via bur hole  Proximal and distal radius/ulnar fracture  Hypertension  Hypokalemia  Diabetes mellitus type 2  Seizure disorder     Continue to monitor in the hospital for work-up and management of the above  Gastroenterology following, appreciate assistance  Hemoglobin stable this a.m., increasing  EGD and colonoscopy reviewed, no sources of bleeding identified  Eliquis restarted yesterday, continue to monitor hemoglobin and observe for melena  Continue PPI  Replace potassium as needed  Continue ppropriate home medications  Accu-Cheks will be ordered ACHS with sliding scale insulin as needed.  CBC, CMP reviewed  Repeat CBC, CMP, mag and Phos in a.m.  Lower extremity duplex personally reviewed, acute right and left lower extremity DVTs     Discussed plan with RN, gastroenterology    DVT prophylaxis:  Medical and mechanical DVT prophylaxis orders  are present.    CODE STATUS:   Level Of Support Discussed With: Patient  Code Status (Patient has no pulse and is not breathing): No CPR (Do Not Attempt to Resuscitate)  Medical Interventions (Patient has pulse or is breathing): Full Support  Release to patient: Routine Release      Electronically signed by Eyad Martinez MD, 07/25/23, 9:26 AM EDT.

## 2023-07-25 NOTE — THERAPY EVALUATION
Acute Care - Physical Therapy Initial Evaluation   Rod     Patient Name: Rebecca Mchugh  : 1950  MRN: [id number]  Today's Date: 2023     Admit date: 2023     Referring Physician: Eyad Martinez MD     Surgery Date:2023 - 2023   Procedure(s) (LRB):  ESOPHAGOGASTRODUODENOSCOPY (N/A)  COLONOSCOPY (N/A)          Visit Dx:     ICD-10-CM ICD-9-CM   1. Severe anemia  D64.9 285.9   2. Occult blood positive stool  R19.5 792.1   3. Acute on chronic anemia  D64.9 285.9   4. Difficulty in walking  R26.2 719.7     Patient Active Problem List   Diagnosis    Trigger thumb of right hand    Aftercare following right 1st TFR    Osteoporosis    Subdural hemorrhage    Subdural hematoma    ICH (intracerebral hemorrhage)    HTN (hypertension)    Distal radius fracture, right    Right distal ulnar fracture    DM (diabetes mellitus), type 2    Hypothyroidism    Mixed hyperlipidemia    Fall    Seizure after head injury    [place] catheter present    Other constipation    Left leg swelling    Anemia    Acute on chronic anemia    Occult blood positive stool     Past Medical History:   Diagnosis Date    Acid reflux     Ankle fracture     Bladder disorder     Breast cancer     Right    Cancer     Colitis     Diabetes     Diabetes mellitus     DVT (deep venous thrombosis)     GERD (gastroesophageal reflux disease)     History of spinal fracture 2020    Hyperlipemia     Recurrent UTI (urinary tract infection)     Renal calculus or stone     Seasonal allergies     Trigger thumb of right hand 2021    Urinary incontinence in female      Past Surgical History:   Procedure Laterality Date    ABDOMINAL HYSTERECTOMY      BACK SURGERY      HERNIATED DISCS  X3 OR 4 TIMES     BLADDER REPAIR      BREAST BIOPSY      BREAST LUMPECTOMY Right     breast mass    MING HOLE N/A 2023    Procedure: LEFT SIDED MING HOLE;  Surgeon: Prieto Castillo MD;  [facility] MA[facility] OR;  Service: Neurosurgery;   Laterality: N/A;    CHOLECYSTECTOMY      COLONOSCOPY      COLONOSCOPY N/A 7/24/2023    Procedure: COLONOSCOPY;  Surgeon: Lee Ann Rios MD;  Location: [facility] ENDOSCOPY;  Service: Gastroenterology;  Laterality: N/A;  DIVERTICULOSIS, HEMORRHOIDS    CYSTOSCOPY      with dilation    ENDOSCOPY N/A 7/24/2023    Procedure: ESOPHAGOGASTRODUODENOSCOPY;  Surgeon: Lee Ann Rios MD;  Location: [facility] ENDOSCOPY;  Service: Gastroenterology;  Laterality: N/A;  HIATAL HERNIA    GALLBLADDER SURGERY      INTERSTIM PLACEMENT  2018-19?    INTRAOCULAR LENS INSERTION      TRIGGER FINGER RELEASE Right 6/17/2021    Procedure: RIGHT FINGER TRIGGER THUMB RELEASE;  Surgeon: Eyad Griffith MD;  [place] [facility] [place] [facility]  Service: Orthopedics;  Laterality: Right;    VAGINAL MESH REVISION      vaginal approach per Dr. Liao     PT Assessment (last 12 hours)       PT Evaluation and Treatment       Row Name 07/25/23 1100          Physical Therapy Time and Intention    Subjective Information no complaints  -ZBIGNIEW     Document Type evaluation  -ZBIGNIEW     Mode of Treatment individual therapy;physical therapy  -ZBIGNIEW     Patient Effort good  -ZBIGNIEW       Row Name 07/25/23 1100          General Information    Patient Observations alert;cooperative;agree to therapy  -ZBIGNIEW     Prior Level of Function independent:;all household mobility;community mobility  -ZBIGNIEW     Equipment Currently Used at Home none  -ZBIGNIEW     Existing Precautions/Restrictions fall;weight bearing  -ZBIGNIEW     Barriers to Rehab none identified  -ZBIGNIEW       Row Name 07/25/23 1100          Living Environment    Current Living Arrangements residential facility  has kasi recently admitted for rehab at a [facility] facility  -ZBIGNIEW       Row Name 07/25/23 1100          Range of Motion (ROM)    Range of Motion ROM is WFL  -ZBIGNIEW       Row Name 07/25/23 1100          Strength (Manual Muscle Testing)    Strength (Manual Muscle Testing) bilateral lower extremities  3+/5  -ZBIGNIEW       Row Name 07/25/23 1100           Bed Mobility    Bed Mobility bed mobility (all) activities;supine-sit  -ZBIGNIEW     All Activities, [place] (Bed Mobility) standby assist  -ZBIGNIEW     Supine-Sit [place] (Bed Mobility) minimum assist (75% patient effort)  -ZBIGNIEW       Row Name 07/25/23 1100          Transfers    Transfers bed-chair transfer;sit-stand transfer  -ZBIGNIEW       Row Name 07/25/23 1100          Bed-Chair Transfer    Bed-Chair [place] (Transfers) minimum assist (75% patient effort)  -ZBIGNIEW     Assistive Device (Bed-Chair Transfers) walker, front-wheeled  -ZBIGNIEW       Row Name 07/25/23 1100          Sit-Stand Transfer    Sit-Stand [place] (Transfers) minimum assist (75% patient effort)  -ZBIGNIEW     Assistive Device (Sit-Stand Transfers) walker, front-wheeled  -ZBIGNIEW       Row Name 07/25/23 1100          Gait/Stairs (Locomotion)    Gait/Stairs Locomotion gait/ambulation assistive device  -ZBIGNIEW     [place] Level (Gait) minimum assist (75% patient effort)  -ZBIGNIEW     Assistive Device (Gait) walker, front-wheeled  -ZBIGNIEW     Distance in Feet (Gait) 10  x2  -ZBIGNIEW       Row Name 07/25/23 1100          Safety Issues, Functional Mobility    Impairments Affecting Function (Mobility) balance;strength  -ZBIGNIEW       Row Name 07/25/23 1100          Balance    Balance Assessment standing dynamic balance  -ZBIGNIEW     Dynamic Standing Balance minimal assist  -ZBIGNIEW     Position/Device Used, Standing Balance walker, front-wheeled  -ZBIGNIEW       Row Name 07/25/23 1100          Plan of Care Review    Plan of Care Reviewed With patient  -ZBIGNIEW     Outcome Evaluation Pt presents with decreased transfers and ambulaiton.  Skilled PT services will be required to address her mobiltiy deficits  -ZBIGNIEW       Row Name 07/25/23 1100          Therapy Assessment/Plan (PT)    Rehab Potential (PT) good, to achieve stated therapy goals  -ZBIGNIEW     Criteria for Skilled Interventions Met (PT) skilled treatment is necessary  -ZBIGNIEW     Therapy Frequency (PT) daily  -ZBIGNIEW     Predicted Duration of Therapy  Intervention (PT) 10 days  -ZBIGNIEW     Problem List (PT) problems related to;balance;mobility;strength  -ZBIGNIEW     Activity Limitations Related to Problem List (PT) unable to transfer safely;unable to ambulate safely  -ZBIGNIEW       Row Name 07/25/23 1100          PT Evaluation Complexity    History, PT Evaluation Complexity no personal factors and/or comorbidities  -ZBIGNIEW     Examination of Body Systems (PT Eval Complexity) total of 4 or more elements  -ZBIGNIEW     Clinical Presentation (PT Evaluation Complexity) stable  -ZBIGNIEW     Clinical Decision Making (PT Evaluation Complexity) low complexity  -ZBIGNIEW     Overall Complexity (PT Evaluation Complexity) low complexity  -ZBIGNIEW       Row Name 07/25/23 1100          Therapy Plan Review/Discharge Plan (PT)    Therapy Plan Review (PT) evaluation/treatment results reviewed;participants voiced agreement with care plan;participants included;patient  -ZBIGNIEW       Row Name 07/25/23 1100          Physical Therapy Goals    Transfer Goal Selection (PT) transfer, PT goal 1  -ZBIGNIEW     Gait Training Goal Selection (PT) gait training, PT goal 1  -ZBIGNIEW       Row Name 07/25/23 1100          Transfer Goal 1 (PT)    Activity/Assistive Device (Transfer Goal 1, PT) transfers, all  -ZBIGNIEW     [place] Level/Cues Needed (Transfer Goal 1, PT) independent  -ZBIGNIEW     Time Frame (Transfer Goal 1, PT) long term goal (LTG);10 days  -ZBIGNIEW       Row Name 07/25/23 1100          Gait Training Goal 1 (PT)    Activity/Assistive Device (Gait Training Goal 1, PT) gait (walking locomotion);assistive device use;walker, rolling  -ZBIGNIEW     [place] Level (Gait Training Goal 1, PT) independent  -ZBIGNIEW     Distance (Gait Training Goal 1, PT) 300  -ZBIGNIEW     Time Frame (Gait Training Goal 1, PT) long term goal (LTG);10 days  -ZBIGNIEW               User Key  (r) = Recorded By, (t) = Taken By, (c) = Cosigned By      Initials Name Provider Type    ZBIGNIEWHarmeet Billings, PT Physical Therapist                      PT Recommendation and Plan  Anticipated Discharge  Disposition (PT): sub acute care setting  Planned Therapy Interventions (PT): balance training, bed mobility training, gait training, home exercise program, strengthening, stair training, transfer training  Therapy Frequency (PT): daily  Plan of Care Reviewed With: patient  Outcome Evaluation: Pt presents with decreased transfers and ambulaiton.  Skilled PT services will be required to address her mobiltiy deficits   Outcome Measures       Row Name 07/25/23 1100             How much help from another person do you currently need...    Turning from your back to your side while in flat bed without using bedrails? 3  -ZBIGNIEW      Moving from lying on back to sitting on the side of a flat bed without bedrails? 3  -ZBIGNIEW      Moving to and from a bed to a chair (including a wheelchair)? 3  -ZBIGNIEW      Standing up from a chair using your arms (e.g., wheelchair, bedside chair)? 3  -ZBIGNIEW      Climbing 3-5 steps with a railing? 2  -ZBIGNIEW      To walk in hospital room? 3  -ZBIGNIEW      AM-PAC 6 Clicks Score (PT) 17  -ZBIGNIEW         Functional Assessment    Outcome Measure Options AM-PAC 6 Clicks Basic Mobility (PT)  -ZBIGNIEW                User Key  (r) = Recorded By, (t) = Taken By, (c) = Cosigned By      Initials Name Provider Type    Harmeet Robbins PT Physical Therapist                     Time Calculation:    PT Charges       Row Name 07/25/23 1139             Time Calculation    PT Received On 07/25/23  -ZBIGNIEW      PT Goal Re-Cert Due Date 08/03/23  -ZBIGNIEW         Untimed Charges    PT Eval/Re-eval Minutes 31  -ZBIGNIEW         Total Minutes    Untimed Charges Total Minutes 31  -ZBIGNIEW       Total Minutes 31  -ZBIGNIEW                User Key  (r) = Recorded By, (t) = Taken By, (c) = Cosigned By      Initials Name Provider Type    Harmeet Robbins PT Physical Therapist                  Therapy Charges for Today       Code Description Service Date Service Provider Modifiers Qty    50723907521 HC PT EVAL LOW COMPLEXITY 3 7/25/2023 Harmeet Bear PT GP 1             PT G-Codes  Outcome Measure Options: AM-PAC 6 Clicks Basic Mobility (PT)  AM-PAC 6 Clicks Score (PT): 17    Harmeet Bear, PT  7/25/2023

## 2023-07-25 NOTE — PLAN OF CARE
Goal Outcome Evaluation: No significant changes through shift. Patient sleeping call light in reach.

## 2023-07-25 NOTE — SIGNIFICANT NOTE
07/25/23 1315   Coping/Psychosocial   Observed Emotional State calm;cooperative   Verbalized Emotional State relief;hopefulness   Trust Relationship/Rapport empathic listening provided   Involvement in Care interacting with patient   Additional Documentation Spiritual Care (Group)   Spiritual Care   Use of Spiritual Resources non-Hindu use of spiritual care   Spiritual Care Source  initiative   Spiritual Care Follow-Up follow-up, none required as presently assessed   Response to Spiritual Care receptive of support   Spiritual Care Interventions supportive conversation provided   Spiritual Care Visit Type initial   Receptivity to Spiritual Care visit welcomed

## 2023-07-26 VITALS
HEIGHT: 64 IN | HEART RATE: 71 BPM | WEIGHT: 146.39 LBS | BODY MASS INDEX: 24.99 KG/M2 | TEMPERATURE: 97.9 F | DIASTOLIC BLOOD PRESSURE: 59 MMHG | RESPIRATION RATE: 20 BRPM | SYSTOLIC BLOOD PRESSURE: 146 MMHG | OXYGEN SATURATION: 96 %

## 2023-07-26 LAB
ALBUMIN SERPL-MCNC: 2.6 G/DL (ref 3.5–5.2)
ALBUMIN/GLOB SERPL: 0.9 G/DL
ALP SERPL-CCNC: 121 U/L (ref 39–117)
ALT SERPL W P-5'-P-CCNC: 10 U/L (ref 1–33)
ANION GAP SERPL CALCULATED.3IONS-SCNC: 10.5 MMOL/L (ref 5–15)
AST SERPL-CCNC: 14 U/L (ref 1–32)
BASOPHILS # BLD AUTO: 0.04 10*3/MM3 (ref 0–0.2)
BASOPHILS NFR BLD AUTO: 1 % (ref 0–1.5)
BILIRUB SERPL-MCNC: 1 MG/DL (ref 0–1.2)
BUN SERPL-MCNC: 9 MG/DL (ref 8–23)
BUN/CREAT SERPL: 10.3 (ref 7–25)
CALCIUM SPEC-SCNC: 8.5 MG/DL (ref 8.6–10.5)
CHLORIDE SERPL-SCNC: 100 MMOL/L (ref 98–107)
CO2 SERPL-SCNC: 21.5 MMOL/L (ref 22–29)
CREAT SERPL-MCNC: 0.87 MG/DL (ref 0.57–1)
DEPRECATED RDW RBC AUTO: 48.4 FL (ref 37–54)
EGFRCR SERPLBLD CKD-EPI 2021: 69.6 ML/MIN/1.73
EOSINOPHIL # BLD AUTO: 0.32 10*3/MM3 (ref 0–0.4)
EOSINOPHIL NFR BLD AUTO: 7.8 % (ref 0.3–6.2)
ERYTHROCYTE [DISTWIDTH] IN BLOOD BY AUTOMATED COUNT: 14.9 % (ref 12.3–15.4)
GLOBULIN UR ELPH-MCNC: 2.8 GM/DL
GLUCOSE BLDC GLUCOMTR-MCNC: 88 MG/DL (ref 70–99)
GLUCOSE BLDC GLUCOMTR-MCNC: 96 MG/DL (ref 70–99)
GLUCOSE SERPL-MCNC: 85 MG/DL (ref 65–99)
HCT VFR BLD AUTO: 35.9 % (ref 34–46.6)
HGB BLD-MCNC: 11.4 G/DL (ref 12–15.9)
IMM GRANULOCYTES # BLD AUTO: 0.01 10*3/MM3 (ref 0–0.05)
IMM GRANULOCYTES NFR BLD AUTO: 0.2 % (ref 0–0.5)
LYMPHOCYTES # BLD AUTO: 1.21 10*3/MM3 (ref 0.7–3.1)
LYMPHOCYTES NFR BLD AUTO: 29.6 % (ref 19.6–45.3)
MAGNESIUM SERPL-MCNC: 1.8 MG/DL (ref 1.6–2.4)
MCH RBC QN AUTO: 28.2 PG (ref 26.6–33)
MCHC RBC AUTO-ENTMCNC: 31.8 G/DL (ref 31.5–35.7)
MCV RBC AUTO: 88.9 FL (ref 79–97)
MONOCYTES # BLD AUTO: 0.54 10*3/MM3 (ref 0.1–0.9)
MONOCYTES NFR BLD AUTO: 13.2 % (ref 5–12)
NEUTROPHILS NFR BLD AUTO: 1.97 10*3/MM3 (ref 1.7–7)
NEUTROPHILS NFR BLD AUTO: 48.2 % (ref 42.7–76)
NRBC BLD AUTO-RTO: 0 /100 WBC (ref 0–0.2)
PHOSPHATE SERPL-MCNC: 3.2 MG/DL (ref 2.5–4.5)
PLATELET # BLD AUTO: 251 10*3/MM3 (ref 140–450)
PMV BLD AUTO: 8.9 FL (ref 6–12)
POTASSIUM SERPL-SCNC: 3.9 MMOL/L (ref 3.5–5.2)
PROT SERPL-MCNC: 5.4 G/DL (ref 6–8.5)
RBC # BLD AUTO: 4.04 10*6/MM3 (ref 3.77–5.28)
SODIUM SERPL-SCNC: 132 MMOL/L (ref 136–145)
WBC NRBC COR # BLD: 4.09 10*3/MM3 (ref 3.4–10.8)

## 2023-07-26 PROCEDURE — 82948 REAGENT STRIP/BLOOD GLUCOSE: CPT

## 2023-07-26 PROCEDURE — 97165 OT EVAL LOW COMPLEX 30 MIN: CPT

## 2023-07-26 PROCEDURE — 85025 COMPLETE CBC W/AUTO DIFF WBC: CPT | Performed by: INTERNAL MEDICINE

## 2023-07-26 PROCEDURE — 84100 ASSAY OF PHOSPHORUS: CPT | Performed by: INTERNAL MEDICINE

## 2023-07-26 PROCEDURE — 83735 ASSAY OF MAGNESIUM: CPT | Performed by: INTERNAL MEDICINE

## 2023-07-26 PROCEDURE — 80053 COMPREHEN METABOLIC PANEL: CPT | Performed by: INTERNAL MEDICINE

## 2023-07-26 PROCEDURE — 99239 HOSP IP/OBS DSCHRG MGMT >30: CPT | Performed by: INTERNAL MEDICINE

## 2023-07-26 RX ORDER — FERROUS SULFATE 325(65) MG
325 TABLET ORAL
Qty: 30 TABLET | Refills: 0 | Status: SHIPPED | OUTPATIENT
Start: 2023-07-26

## 2023-07-26 RX ORDER — PANTOPRAZOLE SODIUM 40 MG/1
40 TABLET, DELAYED RELEASE ORAL
Qty: 30 TABLET | Refills: 0 | Status: SHIPPED | OUTPATIENT
Start: 2023-07-27

## 2023-07-26 RX ADMIN — PANTOPRAZOLE SODIUM 40 MG: 40 TABLET, DELAYED RELEASE ORAL at 05:20

## 2023-07-26 RX ADMIN — LEVETIRACETAM 1000 MG: 500 TABLET, FILM COATED ORAL at 08:55

## 2023-07-26 RX ADMIN — APIXABAN 5 MG: 5 TABLET, FILM COATED ORAL at 08:55

## 2023-07-26 RX ADMIN — LISINOPRIL 10 MG: 10 TABLET ORAL at 08:55

## 2023-07-26 RX ADMIN — CARVEDILOL 6.25 MG: 6.25 TABLET, FILM COATED ORAL at 08:55

## 2023-07-26 RX ADMIN — LEVOTHYROXINE SODIUM 25 MCG: 0.03 TABLET ORAL at 05:20

## 2023-07-26 RX ADMIN — Medication 10 ML: at 08:55

## 2023-07-26 NOTE — PLAN OF CARE
Goal Outcome Evaluation:  Plan of Care Reviewed With: patient        Progress: improving          VSS. Pt has rested this shift. Plans to discharge to [facility] today, waiting for family to transport

## 2023-07-26 NOTE — SIGNIFICANT NOTE
07/26/23 1230   Coping/Psychosocial   Observed Emotional State calm;cooperative;happy   Verbalized Emotional State relief;hopefulness   Trust Relationship/Rapport empathic listening provided   Involvement in Care interacting with patient   Additional Documentation Spiritual Care (Group)   Spiritual Care   Use of Spiritual Resources non-Roman Catholic use of spiritual care   Spiritual Care Source  initiative   Spiritual Care Follow-Up follow-up, none required as presently assessed   Response to Spiritual Care receptive of support;engaged in conversation   Spiritual Care Interventions supportive conversation provided   Spiritual Care Visit Type initial   Receptivity to Spiritual Care visit welcomed

## 2023-07-26 NOTE — NURSING NOTE
VSS throughout shift. Encouraged patient to use call light when she needed to go to the bathroom. Patient up to BSC multiple times with x1 assist. She did well. External catheter removed.

## 2023-07-26 NOTE — THERAPY EVALUATION
Patient Name: Rebecca Mchugh  : 1950    MRN: [id number]                              Today's Date: 2023       Admit Date: 2023    Visit Dx:     ICD-10-CM ICD-9-CM   1. Severe anemia  D64.9 285.9   2. Occult blood positive stool  R19.5 792.1   3. Acute on chronic anemia  D64.9 285.9   4. Difficulty in walking  R26.2 719.7   5. Decreased activities of daily living (ADL)  Z78.9 V49.89     Patient Active Problem List   Diagnosis    Trigger thumb of right hand    Aftercare following right 1st TFR    Osteoporosis    Subdural hemorrhage    Subdural hematoma    ICH (intracerebral hemorrhage)    HTN (hypertension)    Distal radius fracture, right    Right distal ulnar fracture    DM (diabetes mellitus), type 2    Hypothyroidism    Mixed hyperlipidemia    Fall    Seizure after head injury    [place] catheter present    Other constipation    Left leg swelling    Anemia    Acute on chronic anemia    Occult blood positive stool     Past Medical History:   Diagnosis Date    Acid reflux     Ankle fracture     Bladder disorder     Breast cancer     Right    Cancer     Colitis     Diabetes     Diabetes mellitus     DVT (deep venous thrombosis)     GERD (gastroesophageal reflux disease)     History of spinal fracture 2020    Hyperlipemia     Recurrent UTI (urinary tract infection)     Renal calculus or stone     Seasonal allergies     Trigger thumb of right hand 2021    Urinary incontinence in female      Past Surgical History:   Procedure Laterality Date    ABDOMINAL HYSTERECTOMY      BACK SURGERY      HERNIATED DISCS  X3 OR 4 TIMES     BLADDER REPAIR      BREAST BIOPSY      BREAST LUMPECTOMY Right     breast mass    MING HOLE N/A 2023    Procedure: LEFT SIDED MING HOLE;  Surgeon: Prieto Castillo MD;  Location: [facility];  Service: Neurosurgery;  Laterality: N/A;    CHOLECYSTECTOMY      COLONOSCOPY      COLONOSCOPY N/A 2023    Procedure: COLONOSCOPY;  Surgeon: Lee Ann Rios  MD Gregoria;  Location: [facility] ENDOSCOPY;  Service: Gastroenterology;  Laterality: N/A;  DIVERTICULOSIS, HEMORRHOIDS    CYSTOSCOPY      with dilation    ENDOSCOPY N/A 7/24/2023    Procedure: ESOPHAGOGASTRODUODENOSCOPY;  Surgeon: Lee Ann Rios MD;  Location: [facility] ENDOSCOPY;  Service: Gastroenterology;  Laterality: N/A;  HIATAL HERNIA    GALLBLADDER SURGERY      INTERSTIM PLACEMENT  2018-19?    INTRAOCULAR LENS INSERTION      TRIGGER FINGER RELEASE Right 6/17/2021    Procedure: RIGHT FINGER TRIGGER THUMB RELEASE;  Surgeon: Eyad Griffith MD;  Location: [facility] OR [facility];  Service: Orthopedics;  Laterality: Right;    VAGINAL MESH REVISION      vaginal approach per Dr. Liao      General Information       Row Name 07/26/23 1349          OT Time and Intention    Document Type evaluation  -ES     Mode of Treatment individual therapy;occupational therapy  -ES       Row Name 07/26/23 1347          General Information    Patient Profile Reviewed yes  -ES     Prior Level of Function independent:;ADL's;all household mobility;community mobility  Patient independent with ADLs at baseline. Patient has been in rehab x1 month, home independently prior. No device at baseline, rolling walker at rehab. No home O2 use. x3 falls in three months.  -ES     Existing Precautions/Restrictions fall;weight bearing  -ES     Barriers to Rehab none identified  -ES       Row Name 07/26/23 1349          Occupational Profile    Reason for Services/Referral (Occupational Profile) Patient is 73 yr old female [facility]/22/20[facility] from rehabilitation facility with abnormal labs. OT evaluation and treatment ordered d/t recent decline in ADLs/transfer ability and discharge planning recommendations. No previous OT services for current condition.  -ES       Row Name 07/26/23 1349          Living Environment    People in Home alone  -ES       Row Name 07/26/23 1344          Cognition    Orientation Status (Cognition)  oriented x 3  Patient pleasant and cooperative, agreeable to therapy evaluation. Patient is motivated to return to rehab  -       Row Name 07/26/23 1349          Safety Issues, Functional Mobility    Impairments Affecting Function (Mobility) balance;strength  -ES               User Key  (r) = Recorded By, (t) = Taken By, (c) = Cosigned By      Initials Name Provider Type    ES Karla Abrams, OTR/L, CSRS Occupational Therapist                     Mobility/ADL's       Row Name 07/26/23 1356          Bed Mobility    Bed Mobility supine-sit;sit-supine  -ES     Supine-Sit [place] (Bed Mobility) minimum assist (75% patient effort);1 person assist  -ES     Sit-Supine [place] (Bed Mobility) minimum assist (75% patient effort);1 person assist  -ES       Row Name 07/26/23 1356          Transfers    Transfers sit-stand transfer;stand-sit transfer  -ES       Row Name 07/26/23 1356          Sit-Stand Transfer    Sit-Stand [place] (Transfers) minimum assist (75% patient effort);1 person assist  -ES     Assistive Device (Sit-Stand Transfers) walker, front-wheeled  -ES       Row Name 07/26/23 1356          Stand-Sit Transfer    Stand-Sit [place] (Transfers) minimum assist (75% patient effort);1 person assist  -ES     Assistive Device (Stand-Sit Transfers) walker, front-wheeled  -ES       Row Name 07/26/23 1356          Activities of Daily Living    BADL Assessment/Intervention bathing;upper body dressing;lower body dressing;grooming;feeding;toileting  -       Row Name 07/26/23 1356          Bathing Assessment/Intervention    [place] Level (Bathing) bathing skills;minimum assist (75% patient effort)  -       Row Name 07/26/23 1356          Upper Body Dressing Assessment/Training    [place] Level (Upper Body Dressing) upper body dressing skills;set up  -       Row Name 07/26/23 1356          Lower Body Dressing Assessment/Training    [place] Level (Lower Body Dressing) lower body dressing  skills;moderate assist (50% patient effort)  -ES       Row Name 07/26/23 [facility]          Grooming Assessment/Training    [place] Level (Grooming) grooming skills;set up  -ES       Row Name 07/26/23 [facility]          Self-Feeding Assessment/Training    [place] Level (Feeding) feeding skills;set up  -ES       Row Name 07/26/23 [facility]          Toileting Assessment/Training    [place] Level (Toileting) toileting skills;moderate assist (50% patient effort)  -ES               User Key  (r) = Recorded By, (t) = Taken By, (c) = Cosigned By      Initials Name Provider Type    ES Karla Abrams, OTR/L, CSRS Occupational Therapist                   Obj/Interventions       Row Name 07/26/23 [facility]          Sensory Assessment (Somatosensory)    Sensory Assessment (Somatosensory) sensation intact  -ES       Row Name 07/26/23 [facility]          Vision Assessment/Intervention    Visual Impairment/Limitations WFL  -ES       Row Name 07/26/23 [facility]          Range of Motion Comprehensive    Comment, General Range of Motion recent R proximal ulna/radius fx, slow AROM RUE. LUE ROM WFL  -ES       Row Name 07/26/23 [facility]          Strength Comprehensive (MMT)    Comment, General Manual Muscle Testing (MMT) Assessment deferred RUE MMT per patient request. LUE 4/5  -ES       Row Name 07/26/23 [facility]          Motor Skills    Motor Skills coordination;functional endurance  -ES     Functional Endurance fair  -ES       Row Name 07/26/23 [facility]          Balance    Balance Assessment sitting dynamic balance;standing static balance  -ES     Dynamic Sitting Balance contact guard  -ES     Position, Sitting Balance unsupported;sitting edge of bed  -ES     Static Standing Balance minimal assist;1-person assist  -ES     Position/Device Used, Standing Balance supported;walker, front-wheeled  -ES               User Key  (r) = Recorded By, (t) = Taken By, (c) = Cosigned By      Initials Name Provider Type    ES Karla Abrams, OTR/L, CSRS Occupational Therapist                    Goals/Plan       Row Name 07/26/23 1401          Transfer Goal 1 (OT)    Activity/Assistive Device (Transfer Goal 1, OT) transfers, all;walker, rolling  -ES     [place] Level/Cues Needed (Transfer Goal 1, OT) modified independence  -ES     Time Frame (Transfer Goal 1, OT) long term goal (LTG);10 days  -ES       Row Name 07/26/23 1401          Bathing Goal 1 (OT)    Activity/Device (Bathing Goal 1, OT) bathing skills, all  -ES     [place] Level/Cues Needed (Bathing Goal 1, OT) modified independence  -ES     Time Frame (Bathing Goal 1, OT) long term goal (LTG);10 days  -ES       Row Name 07/26/23 1401          Dressing Goal 1 (OT)    Activity/Device (Dressing Goal 1, OT) dressing skills, all  -ES     [place]/Cues Needed (Dressing Goal 1, OT) modified independence  -ES     Time Frame (Dressing Goal 1, OT) long term goal (LTG);10 days  -ES       Row Name 07/26/23 1401          Toileting Goal 1 (OT)    Activity/Device (Toileting Goal 1, OT) toileting skills, all  -ES     [place] Level/Cues Needed (Toileting Goal 1, OT) modified independence  -ES     Time Frame (Toileting Goal 1, OT) long term goal (LTG);10 days  -ES       Row Name 07/26/23 1401          Grooming Goal 1 (OT)    Activity/Device (Grooming Goal 1, OT) grooming skills, all  -ES     [place] (Grooming Goal 1, OT) modified independence  -ES     Time Frame (Grooming Goal 1, OT) long term goal (LTG);10 days  -ES       Row Name 07/26/23 1401          Strength Goal 1 (OT)    Strength Goal 1 (OT) Pt will increase BUE strength 1/2 muscle grade for increased UE strength required for ADL transfers and task completion  -ES     Time Frame (Strength Goal 1, OT) long term goal (LTG);10 days  -ES       Row Name 07/26/23 1401          Problem Specific Goal 1 (OT)    Problem Specific Goal 1 (OT) Patient will demonstrate fair plus activity tolerance in preperation for independent ADL routine completion at time of discharge  -ES      Time Frame (Problem Specific Goal 1, OT) long term goal (LTG);10 days  -ES       Row Name 07/26/23 1401          Therapy Assessment/Plan (OT)    Planned Therapy Interventions (OT) activity tolerance training;BADL retraining;functional balance retraining;occupation/activity based interventions;patient/caregiver education/training;strengthening exercise;transfer/mobility retraining  -ES               User Key  (r) = Recorded By, (t) = Taken By, (c) = Cosigned By      Initials Name Provider Type    ES Karla Abrams, OTR/L, CSRS Occupational Therapist                   Clinical Impression       Row Name 07/26/23 4626          Plan of Care Review    Plan of Care Reviewed With patient  -ES     Progress no change  -ES     Outcome Evaluation Patient has experienced decline in function from baseline status, presenting w/ deficits related to strength, balance, tolerance, transfers and mobility that impede patient independence with activities of daily living.  Patient would benefit from skilled Occupational Therapy intervention to maxamize patient safety, and promote return to baseline independence.  -ES       Row Name 07/26/23 1314          Therapy Assessment/Plan (OT)    Rehab Potential (OT) good, to achieve stated therapy goals  -ES     Criteria for Skilled Therapeutic Interventions Met (OT) yes;meets criteria;skilled treatment is necessary  -ES     Therapy Frequency (OT) 5 times/wk  -ES       Row Name 07/26/23 1875          Therapy Plan Review/Discharge Plan (OT)    Anticipated Discharge Disposition (OT) sub acute care setting  return to rehabilitation facility  -ES       Row Name 07/26/23 135          Vital Signs    O2 Delivery Pre Treatment room air  -ES     O2 Delivery Intra Treatment room air  -ES     O2 Delivery Post Treatment room air  -ES       Row Name 07/26/23 9802          Positioning and Restraints    Pre-Treatment Position in bed  -ES     Post Treatment Position bed  -ES               User Key  (r) = Recorded  By, (t) = Taken By, (c) = Cosigned By      Initials Name Provider Type    Karla Costello, OTR/L, CSRS Occupational Therapist                   Outcome Measures       Row Name 07/26/23 1402          How much help from another is currently needed...    Putting on and taking off regular lower body clothing? 3  -ES     Bathing (including washing, rinsing, and drying) 3  -ES     Toileting (which includes using toilet bed pan or urinal) 3  -ES     Putting on and taking off regular upper body clothing 4  -ES     Taking care of personal grooming (such as brushing teeth) 4  -ES     Eating meals 4  -ES     AM-PAC 6 Clicks Score (OT) 21  -ES       Row Name 07/26/23 0719          How much help from another person do you currently need...    Turning from your back to your side while in flat bed without using bedrails? 3  -SJ     Moving from lying on back to sitting on the side of a flat bed without bedrails? 3  -SJ     Moving to and from a bed to a chair (including a wheelchair)? 3  -SJ     Standing up from a chair using your arms (e.g., wheelchair, bedside chair)? 3  -SJ     Climbing 3-5 steps with a railing? 2  -SJ     To walk in hospital room? 3  -SJ     AM-PAC 6 Clicks Score (PT) 17  -SJ     Highest level of mobility 5 --> Static standing  -SJ       Row Name 07/26/23 1402          Functional Assessment    Outcome Measure Options AM-PAC 6 Clicks Daily Activity (OT);Optimal Instrument  -ES       Row Name 07/26/23 1402          Optimal Instrument    Optimal Instrument Optimal - 3  -ES     Bending/Stooping 2  -ES     Standing 2  -ES     Reaching 2  -ES     From the list, choose the 3 activities you would most like to be able to do without any difficulty Bending/stooping;Standing;Reaching  -ES     Total Score Optimal - 3 6  -ES               User Key  (r) = Recorded By, (t) = Taken By, (c) = Cosigned By      Initials Name Provider Type    Negrita Krishnamurthy, RN Registered Nurse    Karla Costello, OTR/L, CSRS Occupational  Therapist                      OT Recommendation and Plan  Planned Therapy Interventions (OT): activity tolerance training, BADL retraining, functional balance retraining, occupation/activity based interventions, patient/caregiver education/training, strengthening exercise, transfer/mobility retraining  Therapy Frequency (OT): 5 times/wk  Plan of Care Review  Plan of Care Reviewed With: patient  Progress: no change  Outcome Evaluation: Patient has experienced decline in function from baseline status, presenting w/ deficits related to strength, balance, tolerance, transfers and mobility that impede patient independence with activities of daily living.  Patient would benefit from skilled Occupational Therapy intervention to maxamize patient safety, and promote return to baseline independence.     Time Calculation:   Evaluation Complexity (OT)  Review Occupational Profile/Medical/Therapy History Complexity: brief/low complexity  Assessment, Occupational Performance/Identification of Deficit Complexity: 3-5 performance deficits  Clinical Decision Making Complexity (OT): problem focused assessment/low complexity  Overall Complexity of Evaluation (OT): low complexity     Time Calculation- OT       Row Name 07/26/23 1403             Time Calculation- OT    OT Received On 07/26/23  -ES      OT Goal Re-Cert Due Date 08/04/23  -ES         Untimed Charges    OT Eval/Re-eval Minutes 33  -ES         Total Minutes    Untimed Charges Total Minutes 33  -ES       Total Minutes 33  -ES                User Key  (r) = Recorded By, (t) = Taken By, (c) = Cosigned By      Initials Name Provider Type    ES Karla Abrams OTR/L, CSRS Occupational Therapist                  Therapy Charges for Today       Code Description Service Date Service Provider Modifiers Qty    03283390217 HC OT EVAL LOW COMPLEXITY 3 7/26/2023 Karla Abrams OTR/L, CSRS GO 1                 NII Kramer/L, CSRS  7/26/2023

## 2023-07-26 NOTE — DISCHARGE SUMMARY
[facility]  HOSPITALIST  DISCHARGE SUMMARY       Patient Name: Rebecca Mchugh  : 1950  MRN: [id number]  Primary Care Physician: Colin Jamil MD    Date of Admission: 2023  Date of Discharge: 2023    Discharge Diagnoses   Acute on chronic anemia  Concern for blood loss anemia w Iron deficiency  Occult positive stools  Left lower extremity DVT  Recent subdural hematoma status post evacuation via leonardo hole  Proximal and distal radius/ulnar fracture  Hypertension  Hypokalemia, resovled  Diabetes mellitus type 2  Seizure disorder  S/P EGD and colonoscopy this admission  Hospital Course   Hospital Course:  Rebecca Mchugh is a pleasant 73 y.o. female recently diagnosed with subdural hematoma after mechanical fall (2023); underwent evacuation via leonardo hole; sustained proximal and distal radius ulnar fracture; also recently noted to have left lower extremity DVT.  Lab work in the ED showed hemoglobin 6.8 (down from 8.8 a few weeks ago).  She was Hemoccult positive.  Subsequently admitted to the hospitalist service.  Transfused 2 units PRBCs.  Gastroenterology consulted.  Underwent EGD and colonoscopy on 23.  No evidence of any active bleeding.  Eliquis resumed on 2023.  Continued on PPI.  She remained medically stable while here.  Hemoglobin has remained 11.4 for the past several days.  No bloody bowel movements.  Vital signs stable.  She is planning on returning back to rehab facility.  Of note, gastroenterology is planning outpatient capsule endoscopy study to be arranged.  In rehab, she should have hemoglobin monitored periodically.    Discharge Follow Up / Recommendations (labs, diagnostics, meds, etc):   PCP  Gastroenterology ... Dr. Rios's office arranging capsule study  Neurosurgery as previously arranged  Future Appointments   Date Time Provider Department Center   2023  1:30 PM [facility] HOSPITAL PRITESH 1 [facility] MAMMO [facility]   2023  2:30 PM [facility] CHERISE CT 1 [facility] ETWCT  [facility]   8/8/2023  2:00 PM Shelia Velásquez, APRN [id number]     Consultants     Consults       Date and Time Order Name Status Description    7/22/2023  3:20 PM Gastroenterology (on-call MD unless specified) Completed     7/22/2023  3:10 PM Hospitalist (on-call MD unless specified)            On Day of Discharge   VS: Temp:  [97.9 °F (36.6 °C)-98.6 °F (37 °C)] 98.4 °F (36.9 °C)  Heart Rate:  [64-71] 70  Resp:  [16-20] 16  BP: (121-161)/(53-91) 149/53  EXAM:  (refer to progress note from 7/26/2023)     Discharge Medications        New Medications        Instructions Start Date   apixaban 5 MG tablet tablet  Commonly known as: ELIQUIS   5 mg, Oral, Every 12 Hours Scheduled      ferrous sulfate 325 (65 FE) MG tablet  Commonly known as: FerrouSul   325 mg, Oral, Daily With Breakfast      pantoprazole 40 MG EC tablet  Commonly known as: PROTONIX   40 mg, Oral, Every Early Morning   Start Date: July 27, 2023            Continue These Medications        Instructions Start Date   amitriptyline 25 MG tablet  Commonly known as: ELAVIL   25 mg, Oral, Nightly      atorvastatin 40 MG tablet  Commonly known as: LIPITOR   40 mg, Oral, Daily      calcium carbonate 600 MG tablet  Commonly known as: OS-CHAPARRO   600 mg, Oral, 2 Times Daily With Meals      carvedilol 6.25 MG tablet  Commonly known as: COREG   6.25 mg, Oral, 2 Times Daily With Meals      cyclobenzaprine 10 MG tablet  Commonly known as: FLEXERIL   10 mg, Oral, 3 Times Daily PRN      fesoterodine fumarate 8 MG tablet sustained-release 24 hour tablet  Commonly known as: TOVIAZ ER   8 mg, Oral, Every 24 Hours Scheduled      levETIRAcetam 1000 MG tablet  Commonly known as: KEPPRA   1,000 mg, Oral, Every 12 Hours Scheduled      levothyroxine 25 MCG tablet  Commonly known as: SYNTHROID, LEVOTHROID   25 mcg, Oral, Every Early Morning      lisinopril 10 MG tablet  Commonly known as: PRINIVIL,ZESTRIL   10 mg, Oral, Every 24 Hours Scheduled      olopatadine 0.1 % ophthalmic  solution  Commonly known as: PATANOL   1 drop, 2 Times Daily      traZODone 100 MG tablet  Commonly known as: DESYREL   100 mg, Oral, Nightly             Stop These Medications      HYDROcodone-acetaminophen 7.5-325 MG per tablet  Commonly known as: NORCO     omeprazole 40 MG capsule  Commonly known as: priLOSEC            Procedures   EGD  Colonoscopy  Imaging     Results for orders placed during the hospital encounter of 07/22/23    Duplex Venous Lower Extremity - Bilateral CV-READ    Interpretation Summary    Acute right lower extremity deep vein thrombosis noted in the common femoral, proximal femoral and posterior tibial.    Acute left lower extremity deep vein thrombosis noted in the external iliac, proximal femoral, mid femoral, distal femoral, popliteal, posterial tibial, peroneal and gastrocnemius.    Acute-on-chronic left lower extremity deep vein thrombosis noted in the common femoral.    Acute left lower extremity superficial thrombophlebitis noted in the great saphenous (above knee).    All other veins appeared normal bilaterally.    Results for orders placed during the hospital encounter of 02/07/23    Adult Transthoracic Echo Complete W/ Cont if Necessary Per Protocol    Interpretation Summary    Left ventricular systolic function is normal. Left ventricular ejection fraction appears to be 61 - 65%.    Left ventricular wall thickness is consistent with mild concentric hypertrophy.    Left ventricular diastolic function is consistent with (grade I) impaired relaxation.    Aortic valvular sclerosis noted.  There is no hemodynamically significant stenosis or regurgitation.    Duplex Venous Lower Extremity - Bilateral CV-READ    Result Date: 7/24/2023    Acute right lower extremity deep vein thrombosis noted in the common femoral, proximal femoral and posterior tibial.   Acute left lower extremity deep vein thrombosis noted in the external iliac, proximal femoral, mid femoral, distal femoral, popliteal,  posterial tibial, peroneal and gastrocnemius.   Acute-on-chronic left lower extremity deep vein thrombosis noted in the common femoral.   Acute left lower extremity superficial thrombophlebitis noted in the great saphenous (above knee).   All other veins appeared normal bilaterally.     Discharge Details   Hospital Diet:     Diet Order   Procedures    Diet: Regular/House Diet, Diabetic Diets, Cardiac Diets; Healthy Heart (2-3 Na+); Consistent Carbohydrate; Texture: Regular Texture (IDDSI 7); Fluid Consistency: Thin (IDDSI 0)     CODE STATUS:    Code Status and Medical Interventions:   Ordered at: 07/22/23 1634     Level Of Support Discussed With:    Patient     Code Status (Patient has no pulse and is not breathing):    No CPR (Do Not Attempt to Resuscitate)     Medical Interventions (Patient has pulse or is breathing):    Full Support     Release to patient:    Routine Release     Additional Instructions for the Follow-ups that You Need to Schedule       Discharge Follow-up with PCP   As directed       Currently Documented PCP:    Colin Jamil MD    PCP Phone Number:    [phone]     Follow Up Details: PCP after rehab        Discharge Follow-up with Specified Provider: Dr. Rios; 1 Month   As directed      To: Dr. Rios   Follow Up: 1 Month   Follow Up Details: Follow up Dr. Rios 1 month or outpatient capsule endoscopy study to be scheduled              Discharge Disposition: Skilled Nursing Facility ([place] - External)  Pertinent  Labs   LAB RESULTS:      Lab 07/26/23  0455 07/25/23  0500 07/24/23  0402 07/23/23  0406 07/22/23  1219   WBC 4.09 4.51 4.47 5.49 4.76   HEMOGLOBIN 11.4* 11.5* 11.2* 11.4* 6.8*   HEMATOCRIT 35.9 35.4 36.0 32.5* 20.9*   PLATELETS 251 259 226 236 250   NEUTROS ABS 1.97 2.34 2.66  --  3.17   IMMATURE GRANS (ABS) 0.01 0.01 0.02  --  0.02   LYMPHS ABS 1.21 1.15 0.98  --  0.97   MONOS ABS 0.54 0.67 0.51  --  0.45   EOS ABS 0.32 0.29 0.27  --  0.13   MCV 88.9 85.1 89.3 80.4 84.3    PROTIME  --   --   --   --  15.4*   APTT  --   --   --   --  32.7*         Lab 07/26/23  0455 07/25/23  0500 07/24/23  0402 07/23/23  0406 07/22/23  1219   SODIUM 132* 135* 131* 132* 133*   POTASSIUM 3.9 4.5 3.2* 4.1 3.9   CHLORIDE 100 104 101 106 101   CO2 21.5* 21.3* 17.8* 16.4* 22.8   ANION GAP 10.5 9.7 12.2 9.6 9.2   BUN 9 8 8 12 15   CREATININE 0.87 0.84 0.77 0.86 1.05*   EGFR 69.6 72.6 80.6 70.6 55.5*   GLUCOSE 85 94 100* 92 126*   CALCIUM 8.5* 8.4* 8.3* 8.5* 8.7   MAGNESIUM 1.8 1.7 1.9 2.0  --    PHOSPHORUS 3.2 3.0 2.6 3.7  --          Lab 07/26/23  0455 07/22/23  1219   TOTAL PROTEIN 5.4* 5.6*   ALBUMIN 2.6* 2.9*   GLOBULIN 2.8 2.7   ALT (SGPT) 10 10   AST (SGOT) 14 9   BILIRUBIN 1.0 0.8   ALK PHOS 121* 117         Lab 07/22/23  1219   PROTIME 15.4*   INR 1.21*             Lab 07/22/23  1324 07/22/23  1219   IRON  --  18*   IRON SATURATION (TSAT)  --  8*   TIBC  --  231*   TRANSFERRIN  --  155*   FERRITIN  --  609.00*   ABO TYPING B  --    RH TYPING Positive  --    ANTIBODY SCREEN Negative  --          Brief Urine Lab Results       None          Microbiology Results (last 10 days)       ** No results found for the last 240 hours. **          Labs Pending at Discharge:   Time spent on Discharge including face to face service: > 30 minutes  Electronically signed by MICHELLE Franklin, 07/26/23, 11:38 AM EDT.         Patient independently seen and evaluated, agree with assessment and plan, above documentation reflects plan put forth during bedside rounds.  More than 51% of the time of this patient encounter was performed by me.    Briefly patient is a 73-year-old female admitted to the hospital with acute on chronic anemia.  Patient underwent EGD and colonoscopy which was negative for any obvious sources of blood loss.  Patient's hemoglobin stabilized.  Patient was restarted on anticoagulation with continued stability of her hemoglobin.  Patient will require capsule endoscopy as an outpatient.  Patient was  evaluated by physical therapy and Occupational Therapy who recommended rehab.  Patient is discharged back to rehab today in stable condition.    Exam:  GEN: No acute distress  HEENT: Moist mucous membranes  LUNGS: Equal chest rise bilaterally  CARDIAC: Regular rate and rhythm  NEURO: Moving all 4 extremities spontaneously  SKIN: No obvious breakdown    Time spent: Greater than 30 minutes      Electronically signed by Eyad Martinez MD, 07/26/23, 2:56 PM EDT.

## 2023-07-26 NOTE — PLAN OF CARE
Goal Outcome Evaluation:  Plan of Care Reviewed With: patient        Progress: no change  Outcome Evaluation: Patient has experienced decline in function from baseline status, presenting w/ deficits related to strength, balance, tolerance, transfers and mobility that impede patient independence with activities of daily living.  Patient would benefit from skilled Occupational Therapy intervention to maxamize patient safety, and promote return to baseline independence.      Anticipated Discharge Disposition (OT): sub acute care setting (return to rehabilitation facility)

## 2023-07-26 NOTE — TELEPHONE ENCOUNTER
Please arrange outpatient capsule endoscopy for further evaluation of iron deficiency anemia.  thanks

## 2023-07-26 NOTE — PROGRESS NOTES
[facility]   Hospitalist Progress Note  Date: 2023  Patient Name: Rebecca Mchugh  : 1950  MRN: [id number]  Date of admission: 2023      Subjective   Subjective     Chief Complaint: Weakness    Summary: 73 y.o. female with a past medical history significant for right-sided breast cancer, hypertension, GERD, hypothyroidism, chronic pain syndrome.  The patient recently was diagnosed with a subdural hematoma (2023) status post chemical fall for which she underwent an evacuation via bur hole on .  She also sustained a proximal and distal radius/ulnar fracture.  Patient has been undergoing  rehab at a local SNF.  Patient reportedly was recently diagnosed with a left lower extremity DVT.  Prior to initiating Eliquis the patient underwent routine labs to evaluate her H&H.  She was found to have a low H&H and subsequently she was referred to the emergency department for further evaluation.  In the ED hemoglobin was noted to be 6.8 down from 8.8 1-month ago.  She was found to also be Hemoccult positive. She was admitted for further care, transfused 2 units PRBCs.  Gastroenterology consulted.  Underwent EGD and colonoscopy on , no evidence of active bleeding.  Eliquis restarted on .  If hemoglobin stable and no evidence of bleeding, can likely DC back to rehab on .  Of note, patient follows with Dr. Jamil but requested that we remain primary.    Interval Followup: 2023    VSS  Hgb stable x days:  11.4 ------------> 11.4  No acute events overnight  No bloody bowel movements  Eliquis recently resumed.    Room air  RRR  Returning to rehab facility    Objective   Objective     Vitals:   Temp:  [97.9 °F (36.6 °C)-98.6 °F (37 °C)] 98.4 °F (36.9 °C)  Heart Rate:  [64-71] 70  Resp:  [16-20] 16  BP: (121-161)/(53-91) 149/53  Physical Exam   GEN: No acute distress  HEENT: Moist mucous membranes  LUNGS: Equal chest rise bilaterally  CARDIAC: Regular rate and rhythm  NEURO: Moving all 4  extremities spontaneously  SKIN: No obvious breakdown. LLE edema noted.    Result Review    Result Review:  I have personally reviewed the results below:  [x]  Laboratory personally reviewed CBC, BMP, magnesium  []  Microbiology  []  Radiology  [x]  EKG/Telemetry  []  Cardiology/Vascular   []  Pathology  []  Old records  []  Other:  CBC          7/24/2023    04:02 7/25/2023    05:00 7/26/2023    04:55   CBC   WBC 4.47  4.51  4.09    RBC 4.03  4.16  4.04    Hemoglobin 11.2  11.5  11.4    Hematocrit 36.0  35.4  35.9    MCV 89.3  85.1  88.9    MCH 27.8  27.6  28.2    MCHC 31.1  32.5  31.8    RDW 14.8  15.0  14.9    Platelets 226  259  251      CMP          7/24/2023    04:02 7/25/2023    05:00 7/26/2023    04:55   CMP   Glucose 100  94  85    BUN 8  8  9    Creatinine 0.77  0.84  0.87    EGFR 80.6  72.6  69.6    Sodium 131  135  132    Potassium 3.2  4.5  3.9    Chloride 101  104  100    Calcium 8.3  8.4  8.5    Total Protein   5.4    Albumin   2.6    Globulin   2.8    Total Bilirubin   1.0    Alkaline Phosphatase   121    AST (SGOT)   14    ALT (SGPT)   10    Albumin/Globulin Ratio   0.9    BUN/Creatinine Ratio 10.4  9.5  10.3    Anion Gap 12.2  9.7  10.5      Assessment & Plan   Assessment / Plan     Assessment:  Acute on chronic anemia  Concern for blood loss anemia w Iron deficiency  Occult positive stools  Left lower extremity DVT  Recent subdural hematoma status post evacuation via leonardo hole  Proximal and distal radius/ulnar fracture  Hypertension  Hypokalemia, resovled  Diabetes mellitus type 2  Seizure disorder  S/P EGD and colonoscopy this admission     Plan:    Return to [facility] [place] [place] nursing and rehab today  Dr. Rios setting up outpatient capsule study  Continue PPI  Continue Fe  Continue Eliquis  Monitor hemoglobin periodically in rehab      DVT prophylaxis:  Medical and mechanical DVT prophylaxis orders are present.    CODE STATUS:   Level Of Support Discussed With: Patient  Code Status (Patient has no pulse and  is not breathing): No CPR (Do Not Attempt to Resuscitate)  Medical Interventions (Patient has pulse or is breathing): Full Support  Release to patient: Routine Release

## 2023-07-28 NOTE — TELEPHONE ENCOUNTER
Attempted to contact pt in regards to scheduling. Left a vm requesting a return call.   Letter mailed to pt after multiple failed attempts to contact via phone.

## 2023-08-02 ENCOUNTER — HOSPITAL ENCOUNTER (OUTPATIENT)
Dept: CT IMAGING | Facility: HOSPITAL | Age: 73
Discharge: HOME | End: 2023-08-02
Admitting: NEUROLOGICAL SURGERY
Payer: MEDICARE

## 2023-08-02 DIAGNOSIS — I62.00 SUBDURAL HEMORRHAGE: ICD-10-CM

## 2023-08-02 PROCEDURE — 70450 CT HEAD/BRAIN W/O DYE: CPT

## 2023-08-03 NOTE — PROGRESS NOTES
Subjective   History of Present Illness: Rebecca Mchugh is a 73 y.o. female is here today for follow-up on leonardo holes for evacuation of SDH done on 6/16/23. CT head completed 8 /2/23.       The following portions of the patient's history were reviewed and updated as appropriate: allergies, current medications, past family history, past medical history, past social history, past surgical history, and problem list.      Past Medical History:   Diagnosis Date    Acid reflux     Ankle fracture 2020    Bladder disorder     Breast cancer     Right    Cancer     Colitis     Diabetes     Diabetes mellitus     DVT (deep venous thrombosis)     GERD (gastroesophageal reflux disease)     History of spinal fracture 11/2020    Hyperlipemia     Recurrent UTI (urinary tract infection)     Renal calculus or stone     Seasonal allergies     Trigger thumb of right hand 06/17/2021    Urinary incontinence in female         Past Surgical History:   Procedure Laterality Date    ABDOMINAL HYSTERECTOMY      BACK SURGERY      HERNIATED DISCS  X3 OR 4 TIMES     BLADDER REPAIR  2002    BREAST BIOPSY      BREAST LUMPECTOMY Right     breast mass    LEONARDO HOLE N/A 6/16/2023    Procedure: LEFT SIDED LEONARDO HOLE;  Surgeon: Prieto Castillo MD;  Location: [facility];  Service: Neurosurgery;  Laterality: N/A;    CHOLECYSTECTOMY      COLONOSCOPY      COLONOSCOPY N/A 7/24/2023    Procedure: COLONOSCOPY;  Surgeon: Lee Ann Rios MD;  Location: [facility] ENDOSCOPY;  Service: Gastroenterology;  Laterality: N/A;  DIVERTICULOSIS, HEMORRHOIDS    CYSTOSCOPY      with dilation    ENDOSCOPY N/A 7/24/2023    Procedure: ESOPHAGOGASTRODUODENOSCOPY;  Surgeon: Lee Ann Rios MD;  Location: [facility] ENDOSCOPY;  Service: Gastroenterology;  Laterality: N/A;  HIATAL HERNIA    GALLBLADDER SURGERY      INTERSTIM PLACEMENT  2018-19?    INTRAOCULAR LENS INSERTION      TRIGGER FINGER RELEASE Right 6/17/2021    Procedure: RIGHT FINGER TRIGGER THUMB RELEASE;   Surgeon: Eyad Griffith MD;  [facility];  Service: Orthopedics;  Laterality: Right;    VAGINAL MESH REVISION      vaginal approach per Dr. Liao          Current Outpatient Medications:     amitriptyline (ELAVIL) 25 MG tablet, Take 1 tablet by mouth Every Night., Disp: , Rfl:     apixaban (ELIQUIS) 5 MG tablet tablet, Take 1 tablet by mouth Every 12 (Twelve) Hours. Indications: DVT/PE (active thrombosis), Disp: 60 tablet, Rfl: 0    atorvastatin (LIPITOR) 40 MG tablet, Take 1 tablet by mouth Daily., Disp: , Rfl:     calcium carbonate (OS-CHAPARRO) 600 MG tablet, Take 1 tablet by mouth 2 (Two) Times a Day With Meals., Disp: , Rfl:     carvedilol (COREG) 6.25 MG tablet, Take 1 tablet by mouth 2 (Two) Times a Day With Meals., Disp: 60 tablet, Rfl: 0    cyclobenzaprine (FLEXERIL) 10 MG tablet, Take 1 tablet by mouth 3 (Three) Times a Day As Needed for Muscle Spasms., Disp: , Rfl:     ferrous sulfate (FerrouSul) 325 (65 FE) MG tablet, Take 1 tablet by mouth Daily With Breakfast., Disp: 30 tablet, Rfl: 0    fesoterodine fumarate (TOVIAZ ER) 8 MG tablet sustained-release 24 hour tablet, Take 1 tablet by mouth Daily., Disp: , Rfl:     levETIRAcetam (KEPPRA) 1000 MG tablet, Take 1 tablet by mouth Every 12 (Twelve) Hours., Disp: , Rfl:     levothyroxine (SYNTHROID, LEVOTHROID) 25 MCG tablet, Take 1 tablet by mouth Every Morning., Disp: , Rfl:     lisinopril (PRINIVIL,ZESTRIL) 10 MG tablet, Take 1 tablet by mouth Daily., Disp: 30 tablet, Rfl: 0    olopatadine (PATANOL) 0.1 % ophthalmic solution, 1 drop 2 (Two) Times a Day., Disp: , Rfl:     pantoprazole (PROTONIX) 40 MG EC tablet, Take 1 tablet by mouth Every Morning., Disp: 30 tablet, Rfl: 0    traZODone (DESYREL) 100 MG tablet, Take 1 tablet by mouth Every Night., Disp: , Rfl:      Allergies   Allergen Reactions    Clio Hives        Social History     Socioeconomic History    Marital status:     Number of children: 2   Tobacco Use    Smoking status:  Former     Packs/day: 1.00     Types: Cigarettes     Passive exposure: Past    Tobacco comments:     quit smoking at age 48   Vaping Use    Vaping Use: Never used   Substance and Sexual Activity    Alcohol use: Never    Drug use: Never    Sexual activity: Defer        Family History   Problem Relation Age of Onset    Cancer Mother         unspecified    Breast cancer Mother         30s    Heart disease Brother     Cancer Brother         unspecified    Diabetes Brother         unspecified type    Malig Hyperthermia Neg Hx         Review of Systems   Constitutional:  Negative for activity change, chills and fever.   Eyes:  Negative for visual disturbance.   Musculoskeletal:  Negative for gait problem.   Neurological:  Negative for seizures, facial asymmetry, speech difficulty, weakness, light-headedness, numbness and headaches.   Psychiatric/Behavioral:  Positive for confusion.      Objective     Vitals:    08/08/23 1450   BP: 117/80   Pulse: 72   Temp: 92 øF (33.3 øC)   Weight: Comment: In Wheel chair     There is no height or weight on file to calculate BMI.      Physical exam  Awake, alert, oriented x3  Pupils equal round reactive to light  Extraocular muscles intact  Face symmetric  Speech is fluent and clear  No pronator drift  Motor exam  Bilateral deltoids 5/5, bilateral biceps 5/5, bilateral triceps 5/5, bilateral wrist extension 5/5 bilateral hand  5/5  Bilateral hip flexion 5/5, bilateral knee extension 5/5, bilateral DF/PF 5/5  No clonus  No Franki's reflex  gait using walker  Able to detect  light touch in all 4 extremities      Assessment & Plan   Independent Review of Radiographic Studies:      I personally reviewed the images from the following studies.    CT head:  1. Postoperative change with interval placement of left frontal leonardo hole for evaluation of left subdural fluid collection.  There is a small residual left subdural hematoma measuring a maximum of 13 mm over the left frontal lobe,  previously 29 mm at the same location.  No mass effect or   hydrocephalus.    Medical Decision Making:      This is a 73-year-old female who is postoperative bur holes for subdural hemorrhage being seen for posthospitalization follow-up.    She has been in rehab since discharged from the hospital and doing very well.  She denies any headache, blurred vision, nausea or vomiting.  Participating with therapy.    CT has improved, will still want to follow her in about 3 months with repeat CT head.    Diagnoses and all orders for this visit:    1. Subdural hematoma (Primary)  -     CT Head Without Contrast; Future    2. Left-sided nontraumatic intracerebral hemorrhage, unspecified cerebral location  -     CT Head Without Contrast; Future      Return in about 3 months (around 11/8/2023).

## 2023-08-08 ENCOUNTER — OFFICE VISIT (OUTPATIENT)
Dept: NEUROSURGERY | Facility: CLINIC | Age: 73
End: 2023-08-08
Payer: MEDICARE

## 2023-08-08 VITALS — SYSTOLIC BLOOD PRESSURE: 117 MMHG | HEART RATE: 72 BPM | DIASTOLIC BLOOD PRESSURE: 80 MMHG | TEMPERATURE: 92 F

## 2023-08-08 DIAGNOSIS — S06.5XAA SUBDURAL HEMATOMA: Primary | ICD-10-CM

## 2023-08-08 DIAGNOSIS — I61.9 LEFT-SIDED NONTRAUMATIC INTRACEREBRAL HEMORRHAGE, UNSPECIFIED CEREBRAL LOCATION: ICD-10-CM

## 2023-08-08 PROCEDURE — 3079F DIAST BP 80-89 MM HG: CPT | Performed by: NURSE PRACTITIONER

## 2023-08-08 PROCEDURE — 99024 POSTOP FOLLOW-UP VISIT: CPT | Performed by: NURSE PRACTITIONER

## 2023-08-08 PROCEDURE — 1160F RVW MEDS BY RX/DR IN RCRD: CPT | Performed by: NURSE PRACTITIONER

## 2023-08-08 PROCEDURE — 1159F MED LIST DOCD IN RCRD: CPT | Performed by: NURSE PRACTITIONER

## 2023-08-08 PROCEDURE — 3074F SYST BP LT 130 MM HG: CPT | Performed by: NURSE PRACTITIONER

## 2023-08-09 ENCOUNTER — TELEPHONE (OUTPATIENT)
Dept: NEUROSURGERY | Facility: CLINIC | Age: 73
End: 2023-08-09
Payer: MEDICARE

## 2023-08-10 ENCOUNTER — TRANSCRIBE ORDERS (OUTPATIENT)
Dept: ADMINISTRATIVE | Facility: HOSPITAL | Age: 73
End: 2023-08-10
Payer: MEDICARE

## 2023-08-10 DIAGNOSIS — I61.9 CEREBRAL HEMORRHAGE: Primary | ICD-10-CM

## 2023-08-11 ENCOUNTER — NURSING HOME (OUTPATIENT)
Dept: INTERNAL MEDICINE | Facility: CLINIC | Age: 73
End: 2023-08-11
Payer: MEDICARE

## 2023-08-11 DIAGNOSIS — I62.00 SUBDURAL HEMORRHAGE: ICD-10-CM

## 2023-08-11 DIAGNOSIS — E06.3 HYPOTHYROIDISM DUE TO HASHIMOTO'S THYROIDITIS: ICD-10-CM

## 2023-08-11 DIAGNOSIS — E03.8 HYPOTHYROIDISM DUE TO HASHIMOTO'S THYROIDITIS: ICD-10-CM
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
## 2023-08-11 DIAGNOSIS — I10 PRIMARY HYPERTENSION: ICD-10-CM

## 2023-08-11 DIAGNOSIS — E78.2 MIXED HYPERLIPIDEMIA: ICD-10-CM

## 2023-08-11 DIAGNOSIS — I61.9 LEFT-SIDED NONTRAUMATIC INTRACEREBRAL HEMORRHAGE, UNSPECIFIED CEREBRAL LOCATION: Primary | ICD-10-CM

## 2023-08-11 DIAGNOSIS — D64.9 ACUTE ON CHRONIC ANEMIA: ICD-10-CM

## 2023-08-11 NOTE — PROGRESS NOTES
Nursing Home Follow-Up Note      Anand Camejo MD  [x]  [address] [facility] [address] [facility], [address]  [place] [address]  Phone: [phone]  Fax: [phone]     PATIENT NAME: Rebecca Mchugh                                                                          YOB: 1950            DATE OF SERVICE: 08/11/2023  FACILITY:   [] [facility]   [x] [facility]  [] [facility]  [] Other      HISTORY OF PRESENT ILLNESS: Patient is a pleasant 73-year-old female who is being seen for routine follow-up says she is doing well she supposed to be going home today according to her records, she says she has been doing well with rehab no new issues      PAST MEDICAL & SURGICAL HISTORY:   Past Medical History:   Diagnosis Date    Acid reflux     Ankle fracture 2020    Bladder disorder     Breast cancer     Right    Cancer     Colitis     Diabetes     Diabetes mellitus     DVT (deep venous thrombosis)     GERD (gastroesophageal reflux disease)     History of spinal fracture 11/2020    Hyperlipemia     Recurrent UTI (urinary tract infection)     Renal calculus or stone     Seasonal allergies     Trigger thumb of right hand 06/17/2021    Urinary incontinence in female       Past Surgical History:   Procedure Laterality Date    ABDOMINAL HYSTERECTOMY      BACK SURGERY      HERNIATED DISCS  X3 OR 4 TIMES     BLADDER REPAIR  2002    BREAST BIOPSY      BREAST LUMPECTOMY Right     breast mass    MING HOLE N/A 6/16/2023    Procedure: LEFT SIDED MING HOLE;  Surgeon: Prieto Castillo MD;  Location: [facility] OR;  Service: Neurosurgery;  Laterality: N/A;    CHOLECYSTECTOMY      COLONOSCOPY      COLONOSCOPY N/A 7/24/2023    Procedure: COLONOSCOPY;  Surgeon: Lee Ann Rios MD;  Location: [facility] ENDOSCOPY;  Service: Gastroenterology;  Laterality: N/A;  DIVERTICULOSIS, HEMORRHOIDS    CYSTOSCOPY      with dilation    ENDOSCOPY N/A 7/24/2023    Procedure:  ESOPHAGOGASTRODUODENOSCOPY;  Surgeon: Lee Ann Rios MD;  Location: [facility] ENDOSCOPY;  Service: Gastroenterology;  Laterality: N/A;  HIATAL HERNIA    GALLBLADDER SURGERY      INTERSTIM PLACEMENT  2018-19?    INTRAOCULAR LENS INSERTION      TRIGGER FINGER RELEASE Right 6/17/2021    Procedure: RIGHT FINGER TRIGGER THUMB RELEASE;  Surgeon: Eyad Griffith MD;  [place] [facility] [place] [facility]  Service: Orthopedics;  Laterality: Right;    VAGINAL MESH REVISION      vaginal approach per Dr. Liao          MEDICATIONS:  I have reviewed and reconciled the patients medication list in the patients chart at the [facility] nursing [facility] today.        PHYSICAL EXAMINATION:   VITAL SIGNS: 127/65 sat 94% on room air pulse of 72 respiratory rate 18 temperature 97.5    PHYSICAL EXAM:, She is pleasant talkative alert, she has a regular rhythm on her cardiac exam her lungs are clear anteriorly and posteriorly, her abdomen is soft slightly obese nontender she has no edema in her lower legs she is alert and oriented x 3 she can get up and down on her own,      RECORDS REVIEW:   Orders Reviewed.  Labs Reviewed.      ICD-10-CM ICD-9-CM   1. Left-sided nontraumatic intracerebral hemorrhage, unspecified cerebral location  I61.9 431   2. Subdural hemorrhage  I62.00 432.1   3. Fall, subsequent encounter  W19.XXXD V58.89     E888.9   4. Acute on chronic anemia  D64.9 285.9   5. Mixed hyperlipidemia  E78.2 272.2   6. Primary hypertension  I10 401.9   7. Hypothyroidism due to Hashimoto's thyroiditis  E03.8 244.8    E06.3 245.2       ASSESSMENT/PLAN    Diagnoses and all orders for this visit:    1. Left-sided nontraumatic intracerebral hemorrhage, unspecified cerebral location (Primary)    2. Subdural hemorrhage    3. Fall, subsequent encounter    4. Acute on chronic anemia    5. Mixed hyperlipidemia    6. Primary hypertension    7. Hypothyroidism due to Hashimoto's thyroiditis        Fall at home, head injury and subdural hematoma requiring  bur hole drainage emergently at an outside facility, she had impending herniation and midline shift, clinically much improved doing much better overall participating with therapy and cognitively she is very alert July 21, 2023, and August 11, 2023, supposed to be going home today hopefully she can,    Indwelling Trujillo catheter, will try to remove and start bladder training, discussed with nursing staff, July 21, 2023, doing well without catheter, August 11, 2023 anticipating discharge today    Left leg swelling, will check venous evaluation, July 21, 2023, positive for DVT has been started and continues on Eliquis 5 mg twice a day    Constipation issues, will do suppositories make sure she is on a good bowel regimen discussed with nursing staff July 21, 2023    Anemia hemoglobin 8.5 hematocrit 26.8 June 30, 2023, numbers improved to 9.9 and 29 for hemoglobin hematocrit on July 31, 2023    Seizure disorder continues Keppra 1000 mg twice a day,    Hypertension continues lisinopril 10 mg daily    Hyponatremia initially, resolved on repeat labs July 31, 2023    Diabetes hemoglobin A1c of 6.5, currently on no medications for this will need follow-up in the outpatient department    Insomnia continues on trazodone 100 mg at bedtime    Hypothyroidism continues levothyroxine 25 mcg daily    Chronic pain issues continues on Elavil 25 mg at bedtime which increases her risk of falls    Hyperlipidemia continues atorvastatin 40 mg daily  Anand Camejo MD

## 2023-08-15 NOTE — PROGRESS NOTES
[facility]   Hospitalist Progress Note  Date: 23  Patient Name: Rebecca Mchugh  : 1950  MRN: [id number]  Date of admission: 2023      Subjective   Subjective     Chief Complaint: Weakness     Summary:  73 y.o. female with a past medical history significant for right-sided breast cancer, hypertension, GERD, hypothyroidism, chronic pain syndrome.  The patient recently was diagnosed with a subdural hematoma (2023) status post chemical fall for which she underwent an evacuation via bur hole on .  She also sustained a proximal and distal radius/ulnar fracture.  Patient has been undergoing  rehab at a local SNF.  Patient reportedly was recently diagnosed with a left lower extremity DVT.  Prior to initiating Eliquis the patient underwent routine labs to evaluate her H&H.  She was found to have a low H&H and subsequently she was referred to the emergency department for further evaluation.  In the ED hemoglobin was noted to be 6.8 down from 8.8 1-month ago.  She was found to also be Hemoccult positive. She was admitted for further care, transfused 2 units PRBCs.  Gastroenterology consulted.  Underwent EGD and colonoscopy on , no evidence of active bleeding.  Eliquis restarted on .  If hemoglobin stable and no evidence of bleeding, can likely DC back to rehab on .  Of note, patient follows with Dr. Jamil but requested that we remain primary.     Interval Followup: No acute events overnight.  Overall continues to feel well.  Denies chest pain or shortness of air.  No further evidence of bleeding.     Objective   Objective     Vitals:    Vitals reviewed   Physical Exam    Constitutional: Awake, alert, no acute distress   Respiratory: Clear to auscultation bilaterally, nonlabored respirations    Cardiovascular: RRR, no MRG   Gastrointestinal: Positive bowel sounds, soft, nontender, nondistended   Neurologic: Oriented x 3, strength symmetric in all extremities, Cranial Nerves grossly  intact to confrontation, speech clear    Result Review    Result Review:  I have personally reviewed the results below:  [x]  Laboratorypersonally reviewed CBC, BMP, Mg, Phos   []  Microbiology  []  Radiology  []  EKG/Telemetry   []  Cardiology/Vascular   []  Pathology  []  Old records  []  Other:  CBC          7/24/2023    04:02 7/25/2023    05:00 7/26/2023    04:55   CBC   WBC 4.47  4.51  4.09    RBC 4.03  4.16  4.04    Hemoglobin 11.2  11.5  11.4    Hematocrit 36.0  35.4  35.9    MCV 89.3  85.1  88.9    MCH 27.8  27.6  28.2    MCHC 31.1  32.5  31.8    RDW 14.8  15.0  14.9    Platelets 226  259  251      CMP          7/24/2023    04:02 7/25/2023    05:00 7/26/2023    04:55   CMP   Glucose 100  94  85    BUN 8  8  9    Creatinine 0.77  0.84  0.87    EGFR 80.6  72.6  69.6    Sodium 131  135  132    Potassium 3.2  4.5  3.9    Chloride 101  104  100    Calcium 8.3  8.4  8.5    Total Protein   5.4    Albumin   2.6    Globulin   2.8    Total Bilirubin   1.0    Alkaline Phosphatase   121    AST (SGOT)   14    ALT (SGPT)   10    Albumin/Globulin Ratio   0.9    BUN/Creatinine Ratio 10.4  9.5  10.3    Anion Gap 12.2  9.7  10.5        Assessment & Plan   Assessment / Plan     Assessment/Plan:  Acute on chronic anemia  Concern for blood loss anemia  Occult positive stools  Left lower extremity DVT  Recent subdural hematoma status post evacuation via bur hole  Proximal and distal radius/ulnar fracture  Hypertension  Hypokalemia  Diabetes mellitus type 2  Seizure disorder     Continue to monitor in the hospital for work-up and management of the above  Gastroenterology following, appreciate assistance  Hemoglobin has remained stable status post 2 units PRBCs on 7/22 without evidence of active bleeding  EGD and c-scope largely nonrevealing for source of bleeding  Continue Eliquis 5 mg twice daily and monitor hemoglobin closely  Cont PPI daily  Continue ppropriate home medications  Accu-Cheks will be ordered ACHS with sliding  scale insulin as needed.  Trend renal function and electrolytes with a.m. BMP, magnesium   Trend Hgb and WBC with a.m. CBC     Discussed plan with RN, gastroenterology    DVT prophylaxis:  Medical and mechanical DVT prophylaxis orders are present.    CODE STATUS:   Level Of Support Discussed With: Patient  Code Status (Patient has no pulse and is not breathing): No CPR (Do Not Attempt to Resuscitate)  Medical Interventions (Patient has pulse or is breathing): Full Support  Release to patient: Routine Release

## 2023-09-07 ENCOUNTER — HOSPITAL ENCOUNTER (OUTPATIENT)
Dept: CT IMAGING | Facility: HOSPITAL | Age: 73
Discharge: HOME | End: 2023-09-07
Admitting: NURSE PRACTITIONER
Payer: MEDICARE

## 2023-09-07 DIAGNOSIS — S06.5XAA SUBDURAL HEMATOMA: ICD-10-CM

## 2023-09-07 DIAGNOSIS — I61.9 LEFT-SIDED NONTRAUMATIC INTRACEREBRAL HEMORRHAGE, UNSPECIFIED CEREBRAL LOCATION: ICD-10-CM

## 2023-09-07 PROCEDURE — 70450 CT HEAD/BRAIN W/O DYE: CPT

## 2023-09-21 ENCOUNTER — HOSPITAL ENCOUNTER (OUTPATIENT)
Dept: MAMMOGRAPHY | Facility: HOSPITAL | Age: 73
Discharge: HOME | End: 2023-09-21
Admitting: INTERNAL MEDICINE
Payer: MEDICARE

## 2023-09-21 PROCEDURE — 77067 SCR MAMMO BI INCL CAD: CPT

## 2023-09-21 PROCEDURE — 77063 BREAST TOMOSYNTHESIS BI: CPT

## 2023-10-30 ENCOUNTER — TELEPHONE (OUTPATIENT)
Dept: GASTROENTEROLOGY | Facility: CLINIC | Age: 73
End: 2023-10-30
Payer: MEDICARE

## 2023-10-30 NOTE — TELEPHONE ENCOUNTER
Per provider   At this time close the case for the [facility].  Have her follow-up in the office with me or Dr. Rios to discuss further.  Most recent hemoglobin was stable at 10.1.  Thanks       Attempted to contact patient about scheduling a follow up appointment. Phone rang but unable to leave message.

## 2023-10-31 ENCOUNTER — TELEPHONE (OUTPATIENT)
Dept: INTERNAL MEDICINE | Facility: CLINIC | Age: 73
End: 2023-10-31
Payer: MEDICARE

## 2023-10-31 NOTE — TELEPHONE ENCOUNTER
If the patient has left our facility then she needs to follow-up with her primary care doctor and see about getting it scheduled through them we would not be responsible for doing it at this point since she is already left the facility she will have to follow-up with her family doctor to get it ordered

## 2023-10-31 NOTE — TELEPHONE ENCOUNTER
Pt has recently been discharged from Nursing home , are you still wanting her to have this CT of Head with Contrast done, if so a new order needs to be placed. Pt see's Omero / Yair.     Please let me know.     I need to contact Maira at     Please advise

## 2023-11-06 ENCOUNTER — TELEPHONE (OUTPATIENT)
Dept: NEUROSURGERY | Facility: CLINIC | Age: 73
End: 2023-11-06
Payer: MEDICARE

## 2023-11-06 NOTE — TELEPHONE ENCOUNTER
NEW CT head order entered. I called patient to r/s  follow up appointment for 11/7/23. Patient will need to have CT head scan prior to follow up appointment left .

## 2023-12-14 ENCOUNTER — TELEPHONE (OUTPATIENT)
Dept: NEUROSURGERY | Facility: CLINIC | Age: 73
End: 2023-12-14
Payer: MEDICARE

## 2023-12-14 NOTE — TELEPHONE ENCOUNTER
Spoke with patient and she lives near [facility]. She has no way of transportation to [place]. Spoke with  and she is going to check with the [facility] location and see if she can FU there.

## 2024-01-01 ENCOUNTER — APPOINTMENT (OUTPATIENT)
Dept: GENERAL RADIOLOGY | Facility: HOSPITAL | Age: 74
DRG: 682 | End: 2024-01-01
Payer: MEDICARE

## 2024-01-01 ENCOUNTER — APPOINTMENT (OUTPATIENT)
Dept: CT IMAGING | Facility: HOSPITAL | Age: 74
DRG: 682 | End: 2024-01-01
Payer: MEDICARE

## 2024-01-01 ENCOUNTER — HOSPITAL ENCOUNTER (INPATIENT)
Facility: HOSPITAL | Age: 74
LOS: 1 days | DRG: 682 | End: 2024-05-30
Attending: EMERGENCY MEDICINE | Admitting: INTERNAL MEDICINE
Payer: MEDICARE

## 2024-01-01 ENCOUNTER — APPOINTMENT (OUTPATIENT)
Dept: CARDIOLOGY | Facility: HOSPITAL | Age: 74
DRG: 682 | End: 2024-01-01
Payer: MEDICARE

## 2024-01-01 VITALS
RESPIRATION RATE: 24 BRPM | WEIGHT: 126.32 LBS | TEMPERATURE: 98.1 F | DIASTOLIC BLOOD PRESSURE: 69 MMHG | OXYGEN SATURATION: 93 % | HEIGHT: 64 IN | BODY MASS INDEX: 21.57 KG/M2 | SYSTOLIC BLOOD PRESSURE: 84 MMHG

## 2024-01-01 DIAGNOSIS — R10.84 GENERALIZED ABDOMINAL PAIN: ICD-10-CM

## 2024-01-01 DIAGNOSIS — E87.1 HYPONATREMIA: ICD-10-CM

## 2024-01-01 DIAGNOSIS — N17.9 ACUTE RENAL FAILURE, UNSPECIFIED ACUTE RENAL FAILURE TYPE: Primary | ICD-10-CM

## 2024-01-01 DIAGNOSIS — E87.5 HYPERKALEMIA: ICD-10-CM

## 2024-01-01 LAB
ABO GROUP BLD: NORMAL
ACANTHOCYTES BLD QL SMEAR: ABNORMAL
ALBUMIN SERPL-MCNC: 1.7 G/DL (ref 3.5–5.2)
ALBUMIN SERPL-MCNC: 2.1 G/DL (ref 3.5–5.2)
ALBUMIN SERPL-MCNC: 2.8 G/DL (ref 3.5–5.2)
ALBUMIN SERPL-MCNC: 3.9 G/DL (ref 3.5–5.2)
ALBUMIN/GLOB SERPL: 1.5 G/DL
ALBUMIN/GLOB SERPL: 1.7 G/DL
ALBUMIN/GLOB SERPL: 1.8 G/DL
ALP SERPL-CCNC: 30 U/L (ref 39–117)
ALP SERPL-CCNC: 47 U/L (ref 39–117)
ALP SERPL-CCNC: 74 U/L (ref 39–117)
ALT SERPL W P-5'-P-CCNC: 11 U/L (ref 1–33)
ALT SERPL W P-5'-P-CCNC: 8 U/L (ref 1–33)
ALT SERPL W P-5'-P-CCNC: 9 U/L (ref 1–33)
ANION GAP SERPL CALCULATED.3IONS-SCNC: 15.6 MMOL/L (ref 5–15)
ANION GAP SERPL CALCULATED.3IONS-SCNC: 18.3 MMOL/L (ref 5–15)
ANION GAP SERPL CALCULATED.3IONS-SCNC: 20.9 MMOL/L (ref 5–15)
ANION GAP SERPL CALCULATED.3IONS-SCNC: 21 MMOL/L (ref 5–15)
ANION GAP SERPL CALCULATED.3IONS-SCNC: 21.5 MMOL/L (ref 5–15)
ANISOCYTOSIS BLD QL: ABNORMAL
APTT PPP: 56.6 SECONDS (ref 24.2–34.2)
ARTERIAL PATENCY WRIST A: ABNORMAL
AST SERPL-CCNC: 14 U/L (ref 1–32)
AST SERPL-CCNC: 18 U/L (ref 1–32)
AST SERPL-CCNC: 21 U/L (ref 1–32)
B PARAPERT DNA SPEC QL NAA+PROBE: NOT DETECTED
B PERT DNA SPEC QL NAA+PROBE: NOT DETECTED
BASE EXCESS BLDA CALC-SCNC: -14.4 MMOL/L (ref -2–2)
BASE EXCESS BLDA CALC-SCNC: -15.1 MMOL/L (ref -2–2)
BASE EXCESS BLDA CALC-SCNC: -16.5 MMOL/L (ref -2–2)
BASE EXCESS BLDA CALC-SCNC: -18.5 MMOL/L (ref -2–2)
BASE EXCESS BLDV CALC-SCNC: -13.5 MMOL/L (ref -2–2)
BASOPHILS # BLD AUTO: 0.01 10*3/MM3 (ref 0–0.2)
BASOPHILS # BLD AUTO: 0.02 10*3/MM3 (ref 0–0.2)
BASOPHILS NFR BLD AUTO: 0.2 % (ref 0–1.5)
BASOPHILS NFR BLD AUTO: 0.5 % (ref 0–1.5)
BDY SITE: ABNORMAL
BH CV ECHO MEAS - ACS: 1.9 CM
BH CV ECHO MEAS - AO MAX PG: 11.6 MMHG
BH CV ECHO MEAS - AO MEAN PG: 6 MMHG
BH CV ECHO MEAS - AO ROOT DIAM: 3 CM
BH CV ECHO MEAS - AO V2 MAX: 170 CM/SEC
BH CV ECHO MEAS - AO V2 VTI: 35.6 CM
BH CV ECHO MEAS - AVA(I,D): 2.04 CM2
BH CV ECHO MEAS - EDV(CUBED): 79.5 ML
BH CV ECHO MEAS - EDV(MOD-SP2): 75.8 ML
BH CV ECHO MEAS - EDV(MOD-SP4): 58.2 ML
BH CV ECHO MEAS - EF(MOD-BP): 48.6 %
BH CV ECHO MEAS - EF(MOD-SP2): 52.4 %
BH CV ECHO MEAS - EF(MOD-SP4): 45.9 %
BH CV ECHO MEAS - ESV(CUBED): 35.9 ML
BH CV ECHO MEAS - ESV(MOD-SP2): 36.1 ML
BH CV ECHO MEAS - ESV(MOD-SP4): 31.5 ML
BH CV ECHO MEAS - FS: 23.3 %
BH CV ECHO MEAS - IVS/LVPW: 1.18 CM
BH CV ECHO MEAS - IVSD: 1.3 CM
BH CV ECHO MEAS - LA DIMENSION: 2.9 CM
BH CV ECHO MEAS - LAT PEAK E' VEL: 6.7 CM/SEC
BH CV ECHO MEAS - LV MASS(C)D: 184.7 GRAMS
BH CV ECHO MEAS - LV MAX PG: 4 MMHG
BH CV ECHO MEAS - LV MEAN PG: 2 MMHG
BH CV ECHO MEAS - LV V1 MAX: 99.8 CM/SEC
BH CV ECHO MEAS - LV V1 VTI: 23.1 CM
BH CV ECHO MEAS - LVIDD: 4.3 CM
BH CV ECHO MEAS - LVIDS: 3.3 CM
BH CV ECHO MEAS - LVOT AREA: 3.1 CM2
BH CV ECHO MEAS - LVOT DIAM: 2 CM
BH CV ECHO MEAS - LVPWD: 1.1 CM
BH CV ECHO MEAS - MED PEAK E' VEL: 6.1 CM/SEC
BH CV ECHO MEAS - MV A MAX VEL: 112 CM/SEC
BH CV ECHO MEAS - MV DEC TIME: 0.21 SEC
BH CV ECHO MEAS - MV E MAX VEL: 99.6 CM/SEC
BH CV ECHO MEAS - MV E/A: 0.89
BH CV ECHO MEAS - SV(LVOT): 72.6 ML
BH CV ECHO MEAS - SV(MOD-SP2): 39.7 ML
BH CV ECHO MEAS - SV(MOD-SP4): 26.7 ML
BH CV ECHO MEAS - TAPSE (>1.6): 2.25 CM
BH CV ECHO MEASUREMENTS AVERAGE E/E' RATIO: 15.56
BILIRUB SERPL-MCNC: 0.5 MG/DL (ref 0–1.2)
BILIRUB SERPL-MCNC: 0.7 MG/DL (ref 0–1.2)
BILIRUB SERPL-MCNC: 0.7 MG/DL (ref 0–1.2)
BLD GP AB SCN SERPL QL: NEGATIVE
BUN SERPL-MCNC: 58 MG/DL (ref 8–23)
BUN SERPL-MCNC: 70 MG/DL (ref 8–23)
BUN SERPL-MCNC: 74 MG/DL (ref 8–23)
BUN SERPL-MCNC: 94 MG/DL (ref 8–23)
BUN SERPL-MCNC: 98 MG/DL (ref 8–23)
BUN/CREAT SERPL: 11 (ref 7–25)
BUN/CREAT SERPL: 12.1 (ref 7–25)
BUN/CREAT SERPL: 13.1 (ref 7–25)
BUN/CREAT SERPL: 13.2 (ref 7–25)
BUN/CREAT SERPL: 13.4 (ref 7–25)
BURR CELLS BLD QL SMEAR: ABNORMAL
C PNEUM DNA NPH QL NAA+NON-PROBE: NOT DETECTED
CA-I BLDA-SCNC: 0.8 MMOL/L (ref 1.13–1.32)
CA-I BLDA-SCNC: 0.94 MMOL/L (ref 1.13–1.32)
CA-I BLDA-SCNC: 0.96 MMOL/L (ref 1.13–1.32)
CA-I BLDA-SCNC: 0.99 MMOL/L (ref 1.13–1.32)
CA-I BLDA-SCNC: 1.04 MMOL/L (ref 1.13–1.32)
CA-I BLDA-SCNC: 1.05 MMOL/L (ref 1.13–1.32)
CALCIUM SPEC-SCNC: 5.5 MG/DL (ref 8.6–10.5)
CALCIUM SPEC-SCNC: 6.5 MG/DL (ref 8.6–10.5)
CALCIUM SPEC-SCNC: 7.8 MG/DL (ref 8.6–10.5)
CALCIUM SPEC-SCNC: 8.8 MG/DL (ref 8.6–10.5)
CALCIUM SPEC-SCNC: 9.2 MG/DL (ref 8.6–10.5)
CHLORIDE BLDA-SCNC: 101 MMOL/L (ref 98–106)
CHLORIDE BLDA-SCNC: 102 MMOL/L (ref 98–106)
CHLORIDE BLDA-SCNC: 103 MMOL/L (ref 98–106)
CHLORIDE BLDA-SCNC: 99 MMOL/L (ref 98–106)
CHLORIDE BLDV-SCNC: 98 MMOL/L (ref 98–106)
CHLORIDE SERPL-SCNC: 107 MMOL/L (ref 98–107)
CHLORIDE SERPL-SCNC: 89 MMOL/L (ref 98–107)
CHLORIDE SERPL-SCNC: 91 MMOL/L (ref 98–107)
CHLORIDE SERPL-SCNC: 93 MMOL/L (ref 98–107)
CHLORIDE SERPL-SCNC: 98 MMOL/L (ref 98–107)
CO2 SERPL-SCNC: 10.7 MMOL/L (ref 22–29)
CO2 SERPL-SCNC: 12.1 MMOL/L (ref 22–29)
CO2 SERPL-SCNC: 12.4 MMOL/L (ref 22–29)
CO2 SERPL-SCNC: 7.5 MMOL/L (ref 22–29)
CO2 SERPL-SCNC: 8 MMOL/L (ref 22–29)
COHGB MFR BLD: 0.2 % (ref 0–1.5)
COHGB MFR BLD: 3.2 % (ref 0–1.5)
COHGB MFR BLD: 4 % (ref 0–1.5)
COHGB MFR BLD: 4.4 % (ref 0–1.5)
COHGB MFR BLD: 4.5 % (ref 0–1.5)
CREAT SERPL-MCNC: 4.32 MG/DL (ref 0.57–1)
CREAT SERPL-MCNC: 5.29 MG/DL (ref 0.57–1)
CREAT SERPL-MCNC: 5.65 MG/DL (ref 0.57–1)
CREAT SERPL-MCNC: 8.11 MG/DL (ref 0.57–1)
CREAT SERPL-MCNC: 8.56 MG/DL (ref 0.57–1)
D-LACTATE SERPL-SCNC: 1.7 MMOL/L (ref 0.5–2)
D-LACTATE SERPL-SCNC: 6.1 MMOL/L (ref 0.5–2)
D-LACTATE SERPL-SCNC: 7.3 MMOL/L (ref 0.5–2)
D-LACTATE SERPL-SCNC: 7.7 MMOL/L (ref 0.5–2)
DEPRECATED RDW RBC AUTO: 41.8 FL (ref 37–54)
DEPRECATED RDW RBC AUTO: 43.1 FL (ref 37–54)
EGFRCR SERPLBLD CKD-EPI 2021: 10.2 ML/MIN/1.73
EGFRCR SERPLBLD CKD-EPI 2021: 4.5 ML/MIN/1.73
EGFRCR SERPLBLD CKD-EPI 2021: 4.8 ML/MIN/1.73
EGFRCR SERPLBLD CKD-EPI 2021: 7.4 ML/MIN/1.73
EGFRCR SERPLBLD CKD-EPI 2021: 8 ML/MIN/1.73
EOSINOPHIL # BLD AUTO: 0.01 10*3/MM3 (ref 0–0.4)
EOSINOPHIL # BLD AUTO: 0.18 10*3/MM3 (ref 0–0.4)
EOSINOPHIL # BLD MANUAL: 0.04 10*3/MM3 (ref 0–0.4)
EOSINOPHIL NFR BLD AUTO: 0.3 % (ref 0.3–6.2)
EOSINOPHIL NFR BLD AUTO: 4 % (ref 0.3–6.2)
EOSINOPHIL NFR BLD MANUAL: 1 % (ref 0.3–6.2)
ERYTHROCYTE [DISTWIDTH] IN BLOOD BY AUTOMATED COUNT: 13.2 % (ref 12.3–15.4)
ERYTHROCYTE [DISTWIDTH] IN BLOOD BY AUTOMATED COUNT: 13.2 % (ref 12.3–15.4)
FHHB: 1.1 % (ref 0–5)
FHHB: 17.9 % (ref 0–5)
FHHB: 3.4 % (ref 0–5)
FHHB: 4.8 % (ref 0–5)
FHHB: 7.2 % (ref 0–5)
FIBRINOGEN PPP-MCNC: 209 MG/DL (ref 215–521)
FLUAV SUBTYP SPEC NAA+PROBE: NOT DETECTED
FLUBV RNA ISLT QL NAA+PROBE: NOT DETECTED
GAS FLOW AIRWAY: 2 LPM
GAS FLOW AIRWAY: ABNORMAL L/MIN
GAS FLOW AIRWAY: ABNORMAL L/MIN
GEN 5 2HR TROPONIN T REFLEX: 49 NG/L
GLOBULIN UR ELPH-MCNC: 1.2 GM/DL
GLOBULIN UR ELPH-MCNC: 1.9 GM/DL
GLOBULIN UR ELPH-MCNC: 2.3 GM/DL
GLUCOSE BLDA-MCNC: 124 MG/DL (ref 65–99)
GLUCOSE BLDA-MCNC: 189 MG/DL (ref 65–99)
GLUCOSE BLDA-MCNC: 195 MG/DL (ref 65–99)
GLUCOSE BLDA-MCNC: 251 MG/DL (ref 65–99)
GLUCOSE BLDA-MCNC: 492 MG/DL (ref 65–99)
GLUCOSE BLDC GLUCOMTR-MCNC: 162 MG/DL (ref 70–99)
GLUCOSE BLDC GLUCOMTR-MCNC: 166 MG/DL (ref 70–99)
GLUCOSE BLDC GLUCOMTR-MCNC: 226 MG/DL (ref 70–99)
GLUCOSE SERPL-MCNC: 107 MG/DL (ref 65–99)
GLUCOSE SERPL-MCNC: 117 MG/DL (ref 65–99)
GLUCOSE SERPL-MCNC: 135 MG/DL (ref 65–99)
GLUCOSE SERPL-MCNC: 152 MG/DL (ref 65–99)
GLUCOSE SERPL-MCNC: 529 MG/DL (ref 65–99)
HADV DNA SPEC NAA+PROBE: NOT DETECTED
HBV SURFACE AB SER RIA-ACNC: REACTIVE
HBV SURFACE AG SERPL QL IA: NORMAL
HCO3 BLDA-SCNC: 11.4 MMOL/L (ref 22–26)
HCO3 BLDA-SCNC: 11.4 MMOL/L (ref 22–26)
HCO3 BLDA-SCNC: 11.7 MMOL/L (ref 22–26)
HCO3 BLDA-SCNC: 8.2 MMOL/L (ref 22–26)
HCO3 BLDV-SCNC: 13 MMOL/L (ref 22–26)
HCOV 229E RNA SPEC QL NAA+PROBE: NOT DETECTED
HCOV HKU1 RNA SPEC QL NAA+PROBE: NOT DETECTED
HCOV NL63 RNA SPEC QL NAA+PROBE: NOT DETECTED
HCOV OC43 RNA SPEC QL NAA+PROBE: NOT DETECTED
HCT VFR BLD AUTO: 17.4 % (ref 34–46.6)
HCT VFR BLD AUTO: 24.9 % (ref 34–46.6)
HCT VFR BLD AUTO: 25.8 % (ref 34–46.6)
HCT VFR BLD AUTO: 35.8 % (ref 34–46.6)
HGB BLD-MCNC: 11.7 G/DL (ref 12–15.9)
HGB BLD-MCNC: 5.8 G/DL (ref 12–15.9)
HGB BLD-MCNC: 8.2 G/DL (ref 12–15.9)
HGB BLD-MCNC: 8.3 G/DL (ref 12–15.9)
HGB BLDA-MCNC: 10.6 G/DL (ref 11.7–14.6)
HGB BLDA-MCNC: 11 G/DL (ref 11.7–14.6)
HGB BLDA-MCNC: 5.9 G/DL (ref 11.7–14.6)
HGB BLDA-MCNC: 6.6 G/DL (ref 11.7–14.6)
HGB BLDA-MCNC: 7.5 G/DL (ref 11.7–14.6)
HMPV RNA NPH QL NAA+NON-PROBE: NOT DETECTED
HPIV1 RNA ISLT QL NAA+PROBE: NOT DETECTED
HPIV2 RNA SPEC QL NAA+PROBE: NOT DETECTED
HPIV3 RNA NPH QL NAA+PROBE: NOT DETECTED
HPIV4 P GENE NPH QL NAA+PROBE: NOT DETECTED
IMM GRANULOCYTES # BLD AUTO: 0.01 10*3/MM3 (ref 0–0.05)
IMM GRANULOCYTES # BLD AUTO: 0.01 10*3/MM3 (ref 0–0.05)
IMM GRANULOCYTES NFR BLD AUTO: 0.2 % (ref 0–0.5)
IMM GRANULOCYTES NFR BLD AUTO: 0.3 % (ref 0–0.5)
INHALED O2 CONCENTRATION: 100 %
INHALED O2 CONCENTRATION: 100 %
INHALED O2 CONCENTRATION: 28 %
INHALED O2 CONCENTRATION: 60 %
INHALED O2 CONCENTRATION: 60 %
INR PPP: 2.89 (ref 0.86–1.15)
IRON 24H UR-MRATE: 65 MCG/DL (ref 37–145)
IRON SATN MFR SERPL: 38 % (ref 20–50)
L PNEUMO1 AG UR QL IA: NEGATIVE
LACTATE BLDA-SCNC: 5.68 MMOL/L (ref 0.5–2)
LACTATE BLDA-SCNC: 5.98 MMOL/L (ref 0.5–2)
LACTATE BLDA-SCNC: 7.25 MMOL/L (ref 0.5–2)
LACTATE BLDA-SCNC: 7.76 MMOL/L (ref 0.5–2)
LACTATE BLDA-SCNC: 7.81 MMOL/L (ref 0.5–2)
LARGE PLATELETS: ABNORMAL
LDH SERPL-CCNC: 232 U/L (ref 135–214)
LEFT ATRIUM VOLUME INDEX: 25.5 ML/M2
LYMPHOCYTES # BLD AUTO: 0.56 10*3/MM3 (ref 0.7–3.1)
LYMPHOCYTES # BLD AUTO: 0.71 10*3/MM3 (ref 0.7–3.1)
LYMPHOCYTES # BLD MANUAL: 1.02 10*3/MM3 (ref 0.7–3.1)
LYMPHOCYTES NFR BLD AUTO: 12.3 % (ref 19.6–45.3)
LYMPHOCYTES NFR BLD AUTO: 18.1 % (ref 19.6–45.3)
M PNEUMO IGG SER IA-ACNC: NOT DETECTED
MAGNESIUM SERPL-MCNC: 1.6 MG/DL (ref 1.6–2.4)
MAGNESIUM SERPL-MCNC: 1.7 MG/DL (ref 1.6–2.4)
MCH RBC QN AUTO: 28.4 PG (ref 26.6–33)
MCH RBC QN AUTO: 29 PG (ref 26.6–33)
MCHC RBC AUTO-ENTMCNC: 31.8 G/DL (ref 31.5–35.7)
MCHC RBC AUTO-ENTMCNC: 33.3 G/DL (ref 31.5–35.7)
MCV RBC AUTO: 87.1 FL (ref 79–97)
MCV RBC AUTO: 89.3 FL (ref 79–97)
METHGB BLD QL: 0 % (ref 0–1.5)
METHGB BLD QL: 0.1 % (ref 0–1.5)
METHGB BLD QL: 0.6 % (ref 0–1.5)
MODALITY: ABNORMAL
MONOCYTES # BLD AUTO: 0.04 10*3/MM3 (ref 0.1–0.9)
MONOCYTES # BLD AUTO: 0.35 10*3/MM3 (ref 0.1–0.9)
MONOCYTES NFR BLD AUTO: 0.9 % (ref 5–12)
MONOCYTES NFR BLD AUTO: 8.9 % (ref 5–12)
MRSA DNA SPEC QL NAA+PROBE: NORMAL
NEUTROPHILS # BLD AUTO: 2.87 10*3/MM3 (ref 1.7–7)
NEUTROPHILS NFR BLD AUTO: 2.83 10*3/MM3 (ref 1.7–7)
NEUTROPHILS NFR BLD AUTO: 3.74 10*3/MM3 (ref 1.7–7)
NEUTROPHILS NFR BLD AUTO: 71.9 % (ref 42.7–76)
NEUTROPHILS NFR BLD AUTO: 82.4 % (ref 42.7–76)
NEUTROPHILS NFR BLD MANUAL: 72 % (ref 42.7–76)
NEUTS BAND NFR BLD MANUAL: 1 % (ref 0–5)
NOTE: ABNORMAL
NRBC BLD AUTO-RTO: 0 /100 WBC (ref 0–0.2)
NRBC BLD AUTO-RTO: 0 /100 WBC (ref 0–0.2)
NRBC SPEC MANUAL: 1 /100 WBC (ref 0–0.2)
OXYHGB MFR BLDV: 77.6 % (ref 94–99)
OXYHGB MFR BLDV: 91.2 % (ref 94–99)
OXYHGB MFR BLDV: 92 % (ref 94–99)
OXYHGB MFR BLDV: 93.3 % (ref 94–99)
OXYHGB MFR BLDV: 94.3 % (ref 94–99)
PCO2 BLDA: 22.4 MM HG (ref 35–45)
PCO2 BLDA: 26.8 MM HG (ref 35–45)
PCO2 BLDA: 28.9 MM HG (ref 35–45)
PCO2 BLDA: 38.2 MM HG (ref 35–45)
PCO2 BLDV: 32.5 MM HG (ref 41–51)
PH BLDA: 7.11 PH UNITS (ref 7.35–7.45)
PH BLDA: 7.18 PH UNITS (ref 7.35–7.45)
PH BLDA: 7.21 PH UNITS (ref 7.35–7.45)
PH BLDA: 7.25 PH UNITS (ref 7.35–7.45)
PH BLDV: 7.22 PH UNITS (ref 7.31–7.41)
PHOSPHATE SERPL-MCNC: 3.8 MG/DL (ref 2.5–4.5)
PHOSPHATE SERPL-MCNC: 3.8 MG/DL (ref 2.5–4.5)
PLATELET # BLD AUTO: 201 10*3/MM3 (ref 140–450)
PLATELET # BLD AUTO: 260 10*3/MM3 (ref 140–450)
PMV BLD AUTO: 9.9 FL (ref 6–12)
PMV BLD AUTO: 9.9 FL (ref 6–12)
PO2 BLD: 118 MM[HG] (ref 0–500)
PO2 BLD: 158 MM[HG] (ref 0–500)
PO2 BLD: 204 MM[HG] (ref 0–500)
PO2 BLD: 344 MM[HG] (ref 0–500)
PO2 BLDA: 203.9 MM HG (ref 80–100)
PO2 BLDA: 70.7 MM HG (ref 80–100)
PO2 BLDA: 94.9 MM HG (ref 80–100)
PO2 BLDA: 96.4 MM HG (ref 80–100)
PO2 BLDV: 54.6 MM HG (ref 35–42)
POIKILOCYTOSIS BLD QL SMEAR: ABNORMAL
POTASSIUM BLDA-SCNC: 4.64 MMOL/L (ref 3.5–5)
POTASSIUM BLDA-SCNC: 4.84 MMOL/L (ref 3.5–5)
POTASSIUM BLDA-SCNC: 4.84 MMOL/L (ref 3.5–5)
POTASSIUM BLDA-SCNC: 5.62 MMOL/L (ref 3.5–5)
POTASSIUM BLDV-SCNC: 5 MMOL/L (ref 3.5–5)
POTASSIUM SERPL-SCNC: 4.1 MMOL/L (ref 3.5–5.2)
POTASSIUM SERPL-SCNC: 4.8 MMOL/L (ref 3.5–5.2)
POTASSIUM SERPL-SCNC: 5.1 MMOL/L (ref 3.5–5.2)
POTASSIUM SERPL-SCNC: 6.9 MMOL/L (ref 3.5–5.2)
POTASSIUM SERPL-SCNC: 7 MMOL/L (ref 3.5–5.2)
PROT SERPL-MCNC: 3.3 G/DL (ref 6–8.5)
PROT SERPL-MCNC: 4.7 G/DL (ref 6–8.5)
PROT SERPL-MCNC: 6.2 G/DL (ref 6–8.5)
PROTHROMBIN TIME: 30.7 SECONDS (ref 11.8–14.9)
QT INTERVAL: 464 MS
QT INTERVAL: 514 MS
QTC INTERVAL: 469 MS
QTC INTERVAL: 499 MS
RBC # BLD AUTO: 2.86 10*6/MM3 (ref 3.77–5.28)
RBC # BLD AUTO: 2.89 10*6/MM3 (ref 3.77–5.28)
RH BLD: POSITIVE
RHINOVIRUS RNA SPEC NAA+PROBE: NOT DETECTED
RSV RNA NPH QL NAA+NON-PROBE: NOT DETECTED
S PNEUM AG SPEC QL LA: NEGATIVE
SAO2 % BLDCOA: 92.7 % (ref 95–99)
SAO2 % BLDCOA: 95 % (ref 95–99)
SAO2 % BLDCOA: 96.5 % (ref 95–99)
SAO2 % BLDCOA: 98.8 % (ref 95–99)
SAO2 % BLDCOV: 81.3 % (ref 45–75)
SARS-COV-2 RNA RESP QL NAA+PROBE: NOT DETECTED
SODIUM BLDA-SCNC: 125.6 MMOL/L (ref 136–146)
SODIUM BLDA-SCNC: 127.1 MMOL/L (ref 136–146)
SODIUM BLDA-SCNC: 128.3 MMOL/L (ref 136–146)
SODIUM BLDA-SCNC: 129.3 MMOL/L (ref 136–146)
SODIUM BLDV-SCNC: 125.9 MMOL/L (ref 136–146)
SODIUM SERPL-SCNC: 118 MMOL/L (ref 136–145)
SODIUM SERPL-SCNC: 120 MMOL/L (ref 136–145)
SODIUM SERPL-SCNC: 126 MMOL/L (ref 136–145)
SODIUM SERPL-SCNC: 127 MMOL/L (ref 136–145)
SODIUM SERPL-SCNC: 135 MMOL/L (ref 136–145)
T&S EXPIRATION DATE: NORMAL
TIBC SERPL-MCNC: 170 MCG/DL (ref 298–536)
TRANSFERRIN SERPL-MCNC: 114 MG/DL (ref 200–360)
TROPONIN T DELTA: -10 NG/L
TROPONIN T SERPL HS-MCNC: 59 NG/L
VARIANT LYMPHS NFR BLD MANUAL: 26 % (ref 19.6–45.3)
WBC MORPH BLD: NORMAL
WBC NRBC COR # BLD AUTO: 3.93 10*3/MM3 (ref 3.4–10.8)
WBC NRBC COR # BLD AUTO: 4.54 10*3/MM3 (ref 3.4–10.8)

## 2024-01-01 PROCEDURE — 02HV33Z INSERTION OF INFUSION DEVICE INTO SUPERIOR VENA CAVA, PERCUTANEOUS APPROACH: ICD-10-PCS | Performed by: STUDENT IN AN ORGANIZED HEALTH CARE EDUCATION/TRAINING PROGRAM

## 2024-01-01 PROCEDURE — 25010000002 HYDROCORTISONE SOD SUC (PF) 100 MG RECONSTITUTED SOLUTION: Performed by: STUDENT IN AN ORGANIZED HEALTH CARE EDUCATION/TRAINING PROGRAM

## 2024-01-01 PROCEDURE — 85384 FIBRINOGEN ACTIVITY: CPT | Performed by: STUDENT IN AN ORGANIZED HEALTH CARE EDUCATION/TRAINING PROGRAM

## 2024-01-01 PROCEDURE — 25010000002 ATROPINE SULFATE 0.4 MG/ML SOLUTION: Performed by: STUDENT IN AN ORGANIZED HEALTH CARE EDUCATION/TRAINING PROGRAM

## 2024-01-01 PROCEDURE — 83735 ASSAY OF MAGNESIUM: CPT | Performed by: PHYSICIAN ASSISTANT

## 2024-01-01 PROCEDURE — 99291 CRITICAL CARE FIRST HOUR: CPT | Performed by: STUDENT IN AN ORGANIZED HEALTH CARE EDUCATION/TRAINING PROGRAM

## 2024-01-01 PROCEDURE — 84100 ASSAY OF PHOSPHORUS: CPT | Performed by: PHYSICIAN ASSISTANT

## 2024-01-01 PROCEDURE — 25010000002 HEPARIN (PORCINE) PER 1000 UNITS: Performed by: STUDENT IN AN ORGANIZED HEALTH CARE EDUCATION/TRAINING PROGRAM

## 2024-01-01 PROCEDURE — 82948 REAGENT STRIP/BLOOD GLUCOSE: CPT

## 2024-01-01 PROCEDURE — 87641 MR-STAPH DNA AMP PROBE: CPT | Performed by: STUDENT IN AN ORGANIZED HEALTH CARE EDUCATION/TRAINING PROGRAM

## 2024-01-01 PROCEDURE — 84466 ASSAY OF TRANSFERRIN: CPT | Performed by: STUDENT IN AN ORGANIZED HEALTH CARE EDUCATION/TRAINING PROGRAM

## 2024-01-01 PROCEDURE — 25010000002 HYDROMORPHONE 1 MG/ML SOLUTION: Performed by: INTERNAL MEDICINE

## 2024-01-01 PROCEDURE — 83605 ASSAY OF LACTIC ACID: CPT | Performed by: INTERNAL MEDICINE

## 2024-01-01 PROCEDURE — 03HY32Z INSERTION OF MONITORING DEVICE INTO UPPER ARTERY, PERCUTANEOUS APPROACH: ICD-10-PCS | Performed by: PHYSICIAN ASSISTANT

## 2024-01-01 PROCEDURE — 94002 VENT MGMT INPAT INIT DAY: CPT

## 2024-01-01 PROCEDURE — 0 DEXTROSE 5 % SOLUTION: Performed by: STUDENT IN AN ORGANIZED HEALTH CARE EDUCATION/TRAINING PROGRAM

## 2024-01-01 PROCEDURE — 86900 BLOOD TYPING SEROLOGIC ABO: CPT | Performed by: INTERNAL MEDICINE

## 2024-01-01 PROCEDURE — 74176 CT ABD & PELVIS W/O CONTRAST: CPT

## 2024-01-01 PROCEDURE — 87040 BLOOD CULTURE FOR BACTERIA: CPT | Performed by: STUDENT IN AN ORGANIZED HEALTH CARE EDUCATION/TRAINING PROGRAM

## 2024-01-01 PROCEDURE — 83615 LACTATE (LD) (LDH) ENZYME: CPT

## 2024-01-01 PROCEDURE — 93306 TTE W/DOPPLER COMPLETE: CPT

## 2024-01-01 PROCEDURE — 0 DEXTROSE 5 % SOLUTION 250 ML FLEX CONT: Performed by: STUDENT IN AN ORGANIZED HEALTH CARE EDUCATION/TRAINING PROGRAM

## 2024-01-01 PROCEDURE — 02HV33Z INSERTION OF INFUSION DEVICE INTO SUPERIOR VENA CAVA, PERCUTANEOUS APPROACH: ICD-10-PCS | Performed by: PHYSICIAN ASSISTANT

## 2024-01-01 PROCEDURE — 94799 UNLISTED PULMONARY SVC/PX: CPT

## 2024-01-01 PROCEDURE — 0202U NFCT DS 22 TRGT SARS-COV-2: CPT | Performed by: STUDENT IN AN ORGANIZED HEALTH CARE EDUCATION/TRAINING PROGRAM

## 2024-01-01 PROCEDURE — 84100 ASSAY OF PHOSPHORUS: CPT | Performed by: INTERNAL MEDICINE

## 2024-01-01 PROCEDURE — 5A1D70Z PERFORMANCE OF URINARY FILTRATION, INTERMITTENT, LESS THAN 6 HOURS PER DAY: ICD-10-PCS | Performed by: STUDENT IN AN ORGANIZED HEALTH CARE EDUCATION/TRAINING PROGRAM

## 2024-01-01 PROCEDURE — 63710000001 INSULIN REGULAR HUMAN PER 5 UNITS

## 2024-01-01 PROCEDURE — 25010000002 ONDANSETRON PER 1 MG: Performed by: INTERNAL MEDICINE

## 2024-01-01 PROCEDURE — 99285 EMERGENCY DEPT VISIT HI MDM: CPT

## 2024-01-01 PROCEDURE — 84484 ASSAY OF TROPONIN QUANT: CPT | Performed by: STUDENT IN AN ORGANIZED HEALTH CARE EDUCATION/TRAINING PROGRAM

## 2024-01-01 PROCEDURE — 83605 ASSAY OF LACTIC ACID: CPT

## 2024-01-01 PROCEDURE — 82805 BLOOD GASES W/O2 SATURATION: CPT | Performed by: EMERGENCY MEDICINE

## 2024-01-01 PROCEDURE — 36556 INSERT NON-TUNNEL CV CATH: CPT | Performed by: STUDENT IN AN ORGANIZED HEALTH CARE EDUCATION/TRAINING PROGRAM

## 2024-01-01 PROCEDURE — 76937 US GUIDE VASCULAR ACCESS: CPT | Performed by: STUDENT IN AN ORGANIZED HEALTH CARE EDUCATION/TRAINING PROGRAM

## 2024-01-01 PROCEDURE — 85025 COMPLETE CBC W/AUTO DIFF WBC: CPT

## 2024-01-01 PROCEDURE — 83735 ASSAY OF MAGNESIUM: CPT | Performed by: STUDENT IN AN ORGANIZED HEALTH CARE EDUCATION/TRAINING PROGRAM

## 2024-01-01 PROCEDURE — 86900 BLOOD TYPING SEROLOGIC ABO: CPT

## 2024-01-01 PROCEDURE — 85018 HEMOGLOBIN: CPT | Performed by: STUDENT IN AN ORGANIZED HEALTH CARE EDUCATION/TRAINING PROGRAM

## 2024-01-01 PROCEDURE — 82805 BLOOD GASES W/O2 SATURATION: CPT | Performed by: STUDENT IN AN ORGANIZED HEALTH CARE EDUCATION/TRAINING PROGRAM

## 2024-01-01 PROCEDURE — 25810000003 LACTATED RINGERS SOLUTION: Performed by: STUDENT IN AN ORGANIZED HEALTH CARE EDUCATION/TRAINING PROGRAM

## 2024-01-01 PROCEDURE — 25810000003 SODIUM CHLORIDE 0.9 % SOLUTION: Performed by: STUDENT IN AN ORGANIZED HEALTH CARE EDUCATION/TRAINING PROGRAM

## 2024-01-01 PROCEDURE — 83540 ASSAY OF IRON: CPT | Performed by: STUDENT IN AN ORGANIZED HEALTH CARE EDUCATION/TRAINING PROGRAM

## 2024-01-01 PROCEDURE — P9016 RBC LEUKOCYTES REDUCED: HCPCS

## 2024-01-01 PROCEDURE — 86706 HEP B SURFACE ANTIBODY: CPT | Performed by: STUDENT IN AN ORGANIZED HEALTH CARE EDUCATION/TRAINING PROGRAM

## 2024-01-01 PROCEDURE — 86850 RBC ANTIBODY SCREEN: CPT | Performed by: INTERNAL MEDICINE

## 2024-01-01 PROCEDURE — 0BH17EZ INSERTION OF ENDOTRACHEAL AIRWAY INTO TRACHEA, VIA NATURAL OR ARTIFICIAL OPENING: ICD-10-PCS | Performed by: EMERGENCY MEDICINE

## 2024-01-01 PROCEDURE — 25010000002 CEFEPIME PER 500 MG: Performed by: STUDENT IN AN ORGANIZED HEALTH CARE EDUCATION/TRAINING PROGRAM

## 2024-01-01 PROCEDURE — 83050 HGB METHEMOGLOBIN QUAN: CPT | Performed by: STUDENT IN AN ORGANIZED HEALTH CARE EDUCATION/TRAINING PROGRAM

## 2024-01-01 PROCEDURE — 0 DEXTROSE 5 % SOLUTION 1,000 ML FLEX CONT: Performed by: STUDENT IN AN ORGANIZED HEALTH CARE EDUCATION/TRAINING PROGRAM

## 2024-01-01 PROCEDURE — 85730 THROMBOPLASTIN TIME PARTIAL: CPT | Performed by: STUDENT IN AN ORGANIZED HEALTH CARE EDUCATION/TRAINING PROGRAM

## 2024-01-01 PROCEDURE — 74018 RADEX ABDOMEN 1 VIEW: CPT

## 2024-01-01 PROCEDURE — 93005 ELECTROCARDIOGRAM TRACING: CPT | Performed by: EMERGENCY MEDICINE

## 2024-01-01 PROCEDURE — 82820 HEMOGLOBIN-OXYGEN AFFINITY: CPT | Performed by: EMERGENCY MEDICINE

## 2024-01-01 PROCEDURE — 86901 BLOOD TYPING SEROLOGIC RH(D): CPT | Performed by: INTERNAL MEDICINE

## 2024-01-01 PROCEDURE — 25010000002 EPINEPHRINE 1 MG/ML SOLUTION 30 ML VIAL: Performed by: STUDENT IN AN ORGANIZED HEALTH CARE EDUCATION/TRAINING PROGRAM

## 2024-01-01 PROCEDURE — 63710000001 ONDANSETRON ODT 4 MG TABLET DISPERSIBLE: Performed by: INTERNAL MEDICINE

## 2024-01-01 PROCEDURE — 85025 COMPLETE CBC W/AUTO DIFF WBC: CPT | Performed by: INTERNAL MEDICINE

## 2024-01-01 PROCEDURE — 80053 COMPREHEN METABOLIC PANEL: CPT

## 2024-01-01 PROCEDURE — 80053 COMPREHEN METABOLIC PANEL: CPT | Performed by: INTERNAL MEDICINE

## 2024-01-01 PROCEDURE — 82330 ASSAY OF CALCIUM: CPT | Performed by: STUDENT IN AN ORGANIZED HEALTH CARE EDUCATION/TRAINING PROGRAM

## 2024-01-01 PROCEDURE — 94760 N-INVAS EAR/PLS OXIMETRY 1: CPT

## 2024-01-01 PROCEDURE — 63710000001 INSULIN REGULAR HUMAN PER 5 UNITS: Performed by: EMERGENCY MEDICINE

## 2024-01-01 PROCEDURE — 82375 ASSAY CARBOXYHB QUANT: CPT | Performed by: STUDENT IN AN ORGANIZED HEALTH CARE EDUCATION/TRAINING PROGRAM

## 2024-01-01 PROCEDURE — 25010000002 VANCOMYCIN 5 G RECONSTITUTED SOLUTION: Performed by: STUDENT IN AN ORGANIZED HEALTH CARE EDUCATION/TRAINING PROGRAM

## 2024-01-01 PROCEDURE — 5A1935Z RESPIRATORY VENTILATION, LESS THAN 24 CONSECUTIVE HOURS: ICD-10-PCS | Performed by: EMERGENCY MEDICINE

## 2024-01-01 PROCEDURE — 86920 COMPATIBILITY TEST SPIN: CPT

## 2024-01-01 PROCEDURE — 71045 X-RAY EXAM CHEST 1 VIEW: CPT

## 2024-01-01 PROCEDURE — 36600 WITHDRAWAL OF ARTERIAL BLOOD: CPT | Performed by: STUDENT IN AN ORGANIZED HEALTH CARE EDUCATION/TRAINING PROGRAM

## 2024-01-01 PROCEDURE — 25010000002 CALCIUM GLUCONATE-NACL 1-0.675 GM/50ML-% SOLUTION: Performed by: STUDENT IN AN ORGANIZED HEALTH CARE EDUCATION/TRAINING PROGRAM

## 2024-01-01 PROCEDURE — 85610 PROTHROMBIN TIME: CPT | Performed by: STUDENT IN AN ORGANIZED HEALTH CARE EDUCATION/TRAINING PROGRAM

## 2024-01-01 PROCEDURE — 87449 NOS EACH ORGANISM AG IA: CPT | Performed by: STUDENT IN AN ORGANIZED HEALTH CARE EDUCATION/TRAINING PROGRAM

## 2024-01-01 PROCEDURE — 85014 HEMATOCRIT: CPT | Performed by: STUDENT IN AN ORGANIZED HEALTH CARE EDUCATION/TRAINING PROGRAM

## 2024-01-01 PROCEDURE — 87340 HEPATITIS B SURFACE AG IA: CPT | Performed by: STUDENT IN AN ORGANIZED HEALTH CARE EDUCATION/TRAINING PROGRAM

## 2024-01-01 PROCEDURE — 51702 INSERT TEMP BLADDER CATH: CPT

## 2024-01-01 PROCEDURE — 93005 ELECTROCARDIOGRAM TRACING: CPT | Performed by: STUDENT IN AN ORGANIZED HEALTH CARE EDUCATION/TRAINING PROGRAM

## 2024-01-01 PROCEDURE — 36415 COLL VENOUS BLD VENIPUNCTURE: CPT

## 2024-01-01 PROCEDURE — 25010000002 CALCIUM GLUCONATE-NACL 1-0.675 GM/50ML-% SOLUTION: Performed by: PHYSICIAN ASSISTANT

## 2024-01-01 PROCEDURE — 5A1D90Z PERFORMANCE OF URINARY FILTRATION, CONTINUOUS, GREATER THAN 18 HOURS PER DAY: ICD-10-PCS | Performed by: STUDENT IN AN ORGANIZED HEALTH CARE EDUCATION/TRAINING PROGRAM

## 2024-01-01 PROCEDURE — 25010000002 NALOXONE PER 1 MG: Performed by: STUDENT IN AN ORGANIZED HEALTH CARE EDUCATION/TRAINING PROGRAM

## 2024-01-01 PROCEDURE — 25010000002 CALCIUM GLUCONATE-NACL 1-0.675 GM/50ML-% SOLUTION: Performed by: EMERGENCY MEDICINE

## 2024-01-01 PROCEDURE — 94640 AIRWAY INHALATION TREATMENT: CPT

## 2024-01-01 RX ORDER — DEXTROSE MONOHYDRATE 25 G/50ML
25 INJECTION, SOLUTION INTRAVENOUS ONCE
Status: COMPLETED | OUTPATIENT
Start: 2024-01-01 | End: 2024-01-01

## 2024-01-01 RX ORDER — NALOXONE HCL 0.4 MG/ML
0.4 VIAL (ML) INJECTION ONCE
Status: COMPLETED | OUTPATIENT
Start: 2024-01-01 | End: 2024-01-01

## 2024-01-01 RX ORDER — GLYCOPYRROLATE 0.2 MG/ML
0.4 INJECTION INTRAMUSCULAR; INTRAVENOUS EVERY 8 HOURS SCHEDULED
Status: DISCONTINUED | OUTPATIENT
Start: 2024-01-01 | End: 2024-01-01 | Stop reason: HOSPADM

## 2024-01-01 RX ORDER — SODIUM CHLORIDE 9 MG/ML
40 INJECTION, SOLUTION INTRAVENOUS AS NEEDED
Status: DISCONTINUED | OUTPATIENT
Start: 2024-01-01 | End: 2024-01-01 | Stop reason: HOSPADM

## 2024-01-01 RX ORDER — ONDANSETRON 2 MG/ML
4 INJECTION INTRAMUSCULAR; INTRAVENOUS ONCE
Status: DISCONTINUED | OUTPATIENT
Start: 2024-01-01 | End: 2024-01-01

## 2024-01-01 RX ORDER — ALUMINA, MAGNESIA, AND SIMETHICONE 2400; 2400; 240 MG/30ML; MG/30ML; MG/30ML
15 SUSPENSION ORAL EVERY 6 HOURS PRN
Status: DISCONTINUED | OUTPATIENT
Start: 2024-01-01 | End: 2024-01-01

## 2024-01-01 RX ORDER — CALCIUM CHLORIDE, MAGNESIUM CHLORIDE, DEXTROSE MONOHYDRATE, LACTIC ACID, SODIUM CHLORIDE, SODIUM BICARBONATE AND POTASSIUM CHLORIDE 5.15; 2.03; 22; 5.4; 6.46; 3.09; .157 G/L; G/L; G/L; G/L; G/L; G/L; G/L
1000 INJECTION INTRAVENOUS CONTINUOUS
Status: DISCONTINUED | OUTPATIENT
Start: 2024-01-01 | End: 2024-01-01

## 2024-01-01 RX ORDER — POLYETHYLENE GLYCOL 3350 17 G/17G
17 POWDER, FOR SOLUTION ORAL DAILY PRN
Status: DISCONTINUED | OUTPATIENT
Start: 2024-01-01 | End: 2024-01-01

## 2024-01-01 RX ORDER — ETOMIDATE 2 MG/ML
20 INJECTION INTRAVENOUS ONCE
Status: COMPLETED | OUTPATIENT
Start: 2024-01-01 | End: 2024-01-01

## 2024-01-01 RX ORDER — HYDROCODONE BITARTRATE AND ACETAMINOPHEN 5; 325 MG/1; MG/1
1 TABLET ORAL EVERY 4 HOURS PRN
Status: DISCONTINUED | OUTPATIENT
Start: 2024-01-01 | End: 2024-01-01

## 2024-01-01 RX ORDER — BISACODYL 10 MG
10 SUPPOSITORY, RECTAL RECTAL DAILY PRN
Status: DISCONTINUED | OUTPATIENT
Start: 2024-01-01 | End: 2024-01-01

## 2024-01-01 RX ORDER — ALBUTEROL SULFATE 2.5 MG/3ML
SOLUTION RESPIRATORY (INHALATION)
Status: DISPENSED
Start: 2024-01-01 | End: 2024-01-01

## 2024-01-01 RX ORDER — FAMOTIDINE 20 MG/1
40 TABLET, FILM COATED ORAL DAILY
Status: DISCONTINUED | OUTPATIENT
Start: 2024-01-01 | End: 2024-01-01

## 2024-01-01 RX ORDER — HEPARIN SODIUM 1000 [USP'U]/ML
2200 INJECTION, SOLUTION INTRAVENOUS; SUBCUTANEOUS AS NEEDED
Status: DISCONTINUED | OUTPATIENT
Start: 2024-01-01 | End: 2024-01-01 | Stop reason: HOSPADM

## 2024-01-01 RX ORDER — HYDROCODONE BITARTRATE AND ACETAMINOPHEN 7.5; 325 MG/1; MG/1
TABLET ORAL
COMMUNITY

## 2024-01-01 RX ORDER — AMOXICILLIN 250 MG
2 CAPSULE ORAL 2 TIMES DAILY
Status: DISCONTINUED | OUTPATIENT
Start: 2024-01-01 | End: 2024-01-01

## 2024-01-01 RX ORDER — DEXTROSE MONOHYDRATE 25 G/50ML
10-50 INJECTION, SOLUTION INTRAVENOUS
Status: DISCONTINUED | OUTPATIENT
Start: 2024-01-01 | End: 2024-01-01

## 2024-01-01 RX ORDER — ATROPINE SULFATE 0.4 MG/ML
0.4 INJECTION, SOLUTION INTRAVENOUS ONCE
Status: COMPLETED | OUTPATIENT
Start: 2024-01-01 | End: 2024-01-01

## 2024-01-01 RX ORDER — GLYCOPYRROLATE 0.2 MG/ML
0.4 INJECTION INTRAMUSCULAR; INTRAVENOUS ONCE
Status: DISCONTINUED | OUTPATIENT
Start: 2024-01-01 | End: 2024-01-01 | Stop reason: HOSPADM

## 2024-01-01 RX ORDER — ONDANSETRON 2 MG/ML
4 INJECTION INTRAMUSCULAR; INTRAVENOUS EVERY 4 HOURS PRN
Status: DISCONTINUED | OUTPATIENT
Start: 2024-01-01 | End: 2024-01-01

## 2024-01-01 RX ORDER — MORPHINE SULFATE 2 MG/ML
2 INJECTION, SOLUTION INTRAMUSCULAR; INTRAVENOUS
Status: DISCONTINUED | OUTPATIENT
Start: 2024-01-01 | End: 2024-01-01 | Stop reason: HOSPADM

## 2024-01-01 RX ORDER — IBUPROFEN 600 MG/1
1 TABLET ORAL
Status: DISCONTINUED | OUTPATIENT
Start: 2024-01-01 | End: 2024-01-01

## 2024-01-01 RX ORDER — HEPARIN SODIUM 1000 [USP'U]/ML
INJECTION, SOLUTION INTRAVENOUS; SUBCUTANEOUS AS NEEDED
Status: DISCONTINUED | OUTPATIENT
Start: 2024-01-01 | End: 2024-01-01

## 2024-01-01 RX ORDER — NICOTINE POLACRILEX 4 MG
15 LOZENGE BUCCAL
Status: DISCONTINUED | OUTPATIENT
Start: 2024-01-01 | End: 2024-01-01

## 2024-01-01 RX ORDER — NOREPINEPHRINE BITARTRATE 0.03 MG/ML
.02-3 INJECTION, SOLUTION INTRAVENOUS
Status: DISCONTINUED | OUTPATIENT
Start: 2024-01-01 | End: 2024-01-01

## 2024-01-01 RX ORDER — CALCIUM CHLORIDE, MAGNESIUM CHLORIDE, SODIUM CHLORIDE, SODIUM BICARBONATE, POTASSIUM CHLORIDE AND SODIUM PHOSPHATE DIBASIC DIHYDRATE 3.68; 3.05; 6.34; 3.09; .314; .187 G/L; G/L; G/L; G/L; G/L; G/L
2500 INJECTION INTRAVENOUS CONTINUOUS
Status: DISCONTINUED | OUTPATIENT
Start: 2024-01-01 | End: 2024-01-01

## 2024-01-01 RX ORDER — CARVEDILOL 6.25 MG/1
6.25 TABLET ORAL 2 TIMES DAILY WITH MEALS
Status: DISCONTINUED | OUTPATIENT
Start: 2024-01-01 | End: 2024-01-01

## 2024-01-01 RX ORDER — SODIUM CHLORIDE 9 MG/ML
100 INJECTION, SOLUTION INTRAVENOUS CONTINUOUS
Status: DISCONTINUED | OUTPATIENT
Start: 2024-01-01 | End: 2024-01-01

## 2024-01-01 RX ORDER — SODIUM CHLORIDE 0.9 % (FLUSH) 0.9 %
10 SYRINGE (ML) INJECTION EVERY 12 HOURS SCHEDULED
Status: DISCONTINUED | OUTPATIENT
Start: 2024-01-01 | End: 2024-01-01 | Stop reason: HOSPADM

## 2024-01-01 RX ORDER — NITROGLYCERIN 0.4 MG/1
0.4 TABLET SUBLINGUAL
Status: DISCONTINUED | OUTPATIENT
Start: 2024-01-01 | End: 2024-01-01

## 2024-01-01 RX ORDER — SODIUM CHLORIDE 0.9 % (FLUSH) 0.9 %
10 SYRINGE (ML) INJECTION AS NEEDED
Status: DISCONTINUED | OUTPATIENT
Start: 2024-01-01 | End: 2024-01-01 | Stop reason: HOSPADM

## 2024-01-01 RX ORDER — CALCIUM GLUCONATE 20 MG/ML
1000 INJECTION, SOLUTION INTRAVENOUS ONCE
Status: COMPLETED | OUTPATIENT
Start: 2024-01-01 | End: 2024-01-01

## 2024-01-01 RX ORDER — ROCURONIUM BROMIDE 10 MG/ML
70 INJECTION, SOLUTION INTRAVENOUS ONCE
Status: COMPLETED | OUTPATIENT
Start: 2024-01-01 | End: 2024-01-01

## 2024-01-01 RX ORDER — ALUMINA, MAGNESIA, AND SIMETHICONE 2400; 2400; 240 MG/30ML; MG/30ML; MG/30ML
15 SUSPENSION ORAL EVERY 6 HOURS PRN
Status: DISCONTINUED | OUTPATIENT
Start: 2024-01-01 | End: 2024-01-01 | Stop reason: HOSPADM

## 2024-01-01 RX ORDER — MIDAZOLAM HYDROCHLORIDE 2 MG/2ML
1 INJECTION, SOLUTION INTRAMUSCULAR; INTRAVENOUS ONCE
Status: DISCONTINUED | OUTPATIENT
Start: 2024-01-01 | End: 2024-01-01 | Stop reason: HOSPADM

## 2024-01-01 RX ORDER — LEVOTHYROXINE SODIUM 0.03 MG/1
25 TABLET ORAL
Status: DISCONTINUED | OUTPATIENT
Start: 2024-05-31 | End: 2024-01-01

## 2024-01-01 RX ORDER — SODIUM CHLORIDE 0.9 % (FLUSH) 0.9 %
10 SYRINGE (ML) INJECTION AS NEEDED
Status: DISCONTINUED | OUTPATIENT
Start: 2024-01-01 | End: 2024-01-01

## 2024-01-01 RX ORDER — SODIUM CHLORIDE 0.9 % (FLUSH) 0.9 %
10 SYRINGE (ML) INJECTION EVERY 12 HOURS SCHEDULED
Status: DISCONTINUED | OUTPATIENT
Start: 2024-01-01 | End: 2024-01-01

## 2024-01-01 RX ORDER — ALBUTEROL SULFATE 2.5 MG/3ML
7.5 SOLUTION RESPIRATORY (INHALATION) ONCE
Status: COMPLETED | OUTPATIENT
Start: 2024-01-01 | End: 2024-01-01

## 2024-01-01 RX ORDER — ONDANSETRON 4 MG/1
4 TABLET, ORALLY DISINTEGRATING ORAL ONCE
Status: COMPLETED | OUTPATIENT
Start: 2024-01-01 | End: 2024-01-01

## 2024-01-01 RX ORDER — CALCIUM GLUCONATE 20 MG/ML
1000 INJECTION, SOLUTION INTRAVENOUS ONCE
Status: DISCONTINUED | OUTPATIENT
Start: 2024-01-01 | End: 2024-01-01

## 2024-01-01 RX ORDER — BISACODYL 5 MG/1
5 TABLET, DELAYED RELEASE ORAL DAILY PRN
Status: DISCONTINUED | OUTPATIENT
Start: 2024-01-01 | End: 2024-01-01

## 2024-01-01 RX ORDER — ALPRAZOLAM 0.25 MG/1
0.25 TABLET ORAL EVERY 6 HOURS PRN
Status: DISCONTINUED | OUTPATIENT
Start: 2024-01-01 | End: 2024-01-01

## 2024-01-01 RX ORDER — GLYCOPYRROLATE 0.2 MG/ML
0.4 INJECTION INTRAMUSCULAR; INTRAVENOUS
Status: DISCONTINUED | OUTPATIENT
Start: 2024-01-01 | End: 2024-01-01 | Stop reason: HOSPADM

## 2024-01-01 RX ORDER — TEMAZEPAM 7.5 MG/1
15 CAPSULE ORAL NIGHTLY PRN
Status: DISCONTINUED | OUTPATIENT
Start: 2024-01-01 | End: 2024-01-01

## 2024-01-01 RX ORDER — NALOXONE HCL 0.4 MG/ML
0.4 VIAL (ML) INJECTION
Status: DISCONTINUED | OUTPATIENT
Start: 2024-01-01 | End: 2024-01-01 | Stop reason: HOSPADM

## 2024-01-01 RX ORDER — LEVOTHYROXINE SODIUM 0.03 MG/1
25 TABLET ORAL
Status: DISCONTINUED | OUTPATIENT
Start: 2024-01-01 | End: 2024-01-01

## 2024-01-01 RX ORDER — SODIUM CHLORIDE 9 MG/ML
40 INJECTION, SOLUTION INTRAVENOUS AS NEEDED
Status: DISCONTINUED | OUTPATIENT
Start: 2024-01-01 | End: 2024-01-01

## 2024-01-01 RX ORDER — DEXTROSE MONOHYDRATE 25 G/50ML
50 INJECTION, SOLUTION INTRAVENOUS
Status: DISCONTINUED | OUTPATIENT
Start: 2024-01-01 | End: 2024-01-01 | Stop reason: HOSPADM

## 2024-01-01 RX ORDER — ACETAMINOPHEN 325 MG/1
650 TABLET ORAL EVERY 4 HOURS PRN
Status: DISCONTINUED | OUTPATIENT
Start: 2024-01-01 | End: 2024-01-01

## 2024-01-01 RX ORDER — ACETAMINOPHEN 325 MG/1
650 TABLET ORAL EVERY 4 HOURS PRN
Status: DISCONTINUED | OUTPATIENT
Start: 2024-01-01 | End: 2024-01-01 | Stop reason: HOSPADM

## 2024-01-01 RX ADMIN — NALOXONE HYDROCHLORIDE 0.4 MG: 0.4 INJECTION, SOLUTION INTRAMUSCULAR; INTRAVENOUS; SUBCUTANEOUS at 04:39

## 2024-01-01 RX ADMIN — HYDROMORPHONE HYDROCHLORIDE 0.5 MG: 1 INJECTION, SOLUTION INTRAMUSCULAR; INTRAVENOUS; SUBCUTANEOUS at 21:38

## 2024-01-01 RX ADMIN — SODIUM BICARBONATE 75 MEQ: 84 INJECTION, SOLUTION INTRAVENOUS at 00:52

## 2024-01-01 RX ADMIN — ALBUTEROL SULFATE 7.5 MG: 2.5 SOLUTION RESPIRATORY (INHALATION) at 19:16

## 2024-01-01 RX ADMIN — INSULIN HUMAN 7 UNITS: 100 INJECTION, SOLUTION PARENTERAL at 19:08

## 2024-01-01 RX ADMIN — ONDANSETRON 4 MG: 2 INJECTION INTRAMUSCULAR; INTRAVENOUS at 21:29

## 2024-01-01 RX ADMIN — HEPARIN SODIUM 2200 UNITS: 1000 INJECTION, SOLUTION INTRAVENOUS; SUBCUTANEOUS at 05:17

## 2024-01-01 RX ADMIN — EPINEPHRINE 0.02 MCG/KG/MIN: 1 INJECTION INTRAMUSCULAR; INTRAVENOUS; SUBCUTANEOUS at 04:40

## 2024-01-01 RX ADMIN — CALCIUM GLUCONATE 1000 MG: 20 INJECTION, SOLUTION INTRAVENOUS at 19:07

## 2024-01-01 RX ADMIN — Medication 10 ML: at 08:46

## 2024-01-01 RX ADMIN — ROCURONIUM BROMIDE 70 MG: 10 INJECTION, SOLUTION INTRAVENOUS at 04:57

## 2024-01-01 RX ADMIN — INSULIN HUMAN 3.3 UNITS/HR: 1 INJECTION, SOLUTION INTRAVENOUS at 09:49

## 2024-01-01 RX ADMIN — NOREPINEPHRINE BITARTRATE 0.8 MCG/KG/MIN: 0.03 INJECTION, SOLUTION INTRAVENOUS at 05:46

## 2024-01-01 RX ADMIN — ONDANSETRON 4 MG: 4 TABLET, ORALLY DISINTEGRATING ORAL at 18:14

## 2024-01-01 RX ADMIN — HEPARIN SODIUM 2200 UNITS: 1000 INJECTION, SOLUTION INTRAVENOUS; SUBCUTANEOUS at 23:06

## 2024-01-01 RX ADMIN — SODIUM BICARBONATE 200 ML/HR: 84 INJECTION, SOLUTION INTRAVENOUS at 08:46

## 2024-01-01 RX ADMIN — ETOMIDATE 20 MG: 40 INJECTION, SOLUTION INTRAVENOUS at 04:56

## 2024-01-01 RX ADMIN — HYDROCORTISONE SODIUM SUCCINATE 100 MG: 100 INJECTION, POWDER, FOR SOLUTION INTRAMUSCULAR; INTRAVENOUS at 06:27

## 2024-01-01 RX ADMIN — SODIUM BICARBONATE 150 MEQ: 84 INJECTION, SOLUTION INTRAVENOUS at 06:01

## 2024-01-01 RX ADMIN — NOREPINEPHRINE BITARTRATE 0.8 MCG/KG/MIN: 0.03 INJECTION, SOLUTION INTRAVENOUS at 08:45

## 2024-01-01 RX ADMIN — CEFEPIME 2000 MG: 2 INJECTION, POWDER, FOR SOLUTION INTRAVENOUS at 10:59

## 2024-01-01 RX ADMIN — Medication 10 ML: at 22:24

## 2024-01-01 RX ADMIN — DEXTROSE MONOHYDRATE 50 ML: 25 INJECTION, SOLUTION INTRAVENOUS at 05:02

## 2024-01-01 RX ADMIN — HYDROMORPHONE HYDROCHLORIDE 0.5 MG: 1 INJECTION, SOLUTION INTRAMUSCULAR; INTRAVENOUS; SUBCUTANEOUS at 00:49

## 2024-01-01 RX ADMIN — SODIUM ZIRCONIUM CYCLOSILICATE 10 G: 10 POWDER, FOR SUSPENSION ORAL at 19:09

## 2024-01-01 RX ADMIN — NOREPINEPHRINE BITARTRATE 0.02 MCG/KG/MIN: 0.03 INJECTION, SOLUTION INTRAVENOUS at 20:57

## 2024-01-01 RX ADMIN — CALCIUM GLUCONATE 1000 MG: 20 INJECTION, SOLUTION INTRAVENOUS at 05:11

## 2024-01-01 RX ADMIN — VANCOMYCIN HYDROCHLORIDE 1250 MG: 5 INJECTION, POWDER, LYOPHILIZED, FOR SOLUTION INTRAVENOUS at 08:47

## 2024-01-01 RX ADMIN — ATROPINE SULFATE 0.4 MG: 0.4 INJECTION, SOLUTION INTRAVENOUS at 04:38

## 2024-01-01 RX ADMIN — SODIUM CHLORIDE 8 MG/HR: 9 INJECTION, SOLUTION INTRAVENOUS at 10:11

## 2024-01-01 RX ADMIN — INSULIN HUMAN 10 UNITS: 100 INJECTION, SOLUTION PARENTERAL at 05:02

## 2024-01-01 RX ADMIN — CALCIUM GLUCONATE 1000 MG: 20 INJECTION, SOLUTION INTRAVENOUS at 10:37

## 2024-01-01 RX ADMIN — EPINEPHRINE 0.1 MCG/KG/MIN: 1 INJECTION INTRAMUSCULAR; INTRAVENOUS; SUBCUTANEOUS at 10:12

## 2024-01-01 RX ADMIN — NOREPINEPHRINE BITARTRATE 0.8 MCG/KG/MIN: 1 INJECTION, SOLUTION, CONCENTRATE INTRAVENOUS at 10:00

## 2024-01-01 RX ADMIN — SODIUM BICARBONATE 50 MEQ: 84 INJECTION INTRAVENOUS at 04:43

## 2024-01-01 RX ADMIN — SODIUM CHLORIDE, POTASSIUM CHLORIDE, SODIUM LACTATE AND CALCIUM CHLORIDE 1000 ML: 600; 310; 30; 20 INJECTION, SOLUTION INTRAVENOUS at 22:30

## 2024-01-01 RX ADMIN — VASOPRESSIN 0.03 UNITS/MIN: 0.2 INJECTION INTRAVENOUS at 22:45

## 2024-01-01 RX ADMIN — SODIUM CHLORIDE 1000 ML: 9 INJECTION, SOLUTION INTRAVENOUS at 20:03

## 2024-01-01 RX ADMIN — CALCIUM CHLORIDE, MAGNESIUM CHLORIDE, DEXTROSE MONOHYDRATE, LACTIC ACID, SODIUM CHLORIDE, SODIUM BICARBONATE AND POTASSIUM CHLORIDE 1000 ML/HR: 5.15; 2.03; 22; 5.4; 6.46; 3.09; .157 INJECTION INTRAVENOUS at 09:53

## 2024-01-01 RX ADMIN — DEXTROSE MONOHYDRATE 25 G: 25 INJECTION, SOLUTION INTRAVENOUS at 19:07

## 2024-01-01 RX ADMIN — HYDROMORPHONE HYDROCHLORIDE 0.5 MG: 1 INJECTION, SOLUTION INTRAMUSCULAR; INTRAVENOUS; SUBCUTANEOUS at 03:31

## 2024-01-01 RX ADMIN — SODIUM BICARBONATE 100 MEQ: 84 INJECTION INTRAVENOUS at 19:07

## 2024-01-01 RX ADMIN — NOREPINEPHRINE BITARTRATE 1.04 MCG/KG/MIN: 1 INJECTION, SOLUTION, CONCENTRATE INTRAVENOUS at 10:20

## 2024-01-01 RX ADMIN — CALCIUM CHLORIDE, MAGNESIUM CHLORIDE, SODIUM CHLORIDE, SODIUM BICARBONATE, POTASSIUM CHLORIDE AND SODIUM PHOSPHATE DIBASIC DIHYDRATE 2500 ML/HR: 3.68; 3.05; 6.34; 3.09; .314; .187 INJECTION INTRAVENOUS at 09:53

## 2024-01-01 RX ADMIN — VASOPRESSIN 0.03 UNITS/MIN: 0.2 INJECTION INTRAVENOUS at 05:46

## 2024-01-01 RX ADMIN — SODIUM BICARBONATE 200 ML/HR: 84 INJECTION, SOLUTION INTRAVENOUS at 09:59

## 2024-05-29 PROBLEM — N17.9 ARF (ACUTE RENAL FAILURE): Status: ACTIVE | Noted: 2024-01-01

## 2024-05-29 PROBLEM — D64.9 ANEMIA: Status: ACTIVE | Noted: 2024-01-01

## 2024-05-29 PROBLEM — E87.5 HYPERKALEMIA: Status: ACTIVE | Noted: 2024-01-01

## 2024-05-29 PROBLEM — N17.9 ACUTE RENAL FAILURE: Status: ACTIVE | Noted: 2024-01-01

## 2024-05-29 PROBLEM — G40.909 SEIZURE DISORDER: Chronic | Status: ACTIVE | Noted: 2024-01-01

## 2024-05-29 NOTE — ED PROVIDER NOTES
SHARED VISIT NOTE:    Patient is 73 y.o. year old female that presents to the ED for evaluation of abdominal pain.  I was notified of this patient's case at approximately 1830.  This point the patient was seen and evaluated by me.  At this point, I helped direct medical therapy.    Physical Exam  Vital signs were reviewed under triage note.  General appearance - Patient appears well-developed and well-nourished.  Patient is acutely ill-appearing.  Patient smells ketotic.  Head - Normocephalic, atraumatic.  Pupils - Equal, round, reactive to light.  Extraocular muscles are intact.  Conjunctiva is clear.  Nasal - Normal inspection.  No evidence of trauma or epistaxis.  Tympanic membranes - Gray, intact without erythema or retractions.  Oral mucosa - Pink and moist without lesions or erythema.  Uvula is midline.  Chest wall - Atraumatic.  Chest wall is nontender.  There are no vesicular rashes noted.  Neck - Supple.  Trachea was midline.  There is no palpable lymphadenopathy or thyromegaly.  There are no meningeal signs  Lungs - Clear to auscultation and percussion bilaterally.  Patient is tachypneic.  Heart - Regular rate and rhythm without any murmurs, clicks, or gallops.  Abdomen - Soft.  Bowel sounds are present.  There is generalized diffuse palpable tenderness.  There is no rebound, guarding, or rigidity.  There are no palpable masses.  There are no pulsatile masses.  Back - Spine is straight and midline.  There is no CVA tenderness.  Extremities - Intact x4 with full range of motion.  There is no palpable edema.  Pulses are intact x4 and equal.  Neurologic - Patient is awake, alert, and oriented x3.  Cranial nerves II through XII are grossly intact.  Motor and sensory functions grossly intact.  Cerebellar function was normal.  Integument - There are no rashes.  There are no petechia or purpura lesions noted.  There are no vesicular lesions noted.      ED Course:    BP (!) 82/66   Pulse 70   Temp 97.7 °F (36.5  "°C) (Oral)   Resp 18   Ht 162.6 cm (64\")   Wt 60.7 kg (133 lb 13.1 oz)   SpO2 99%   BMI 22.97 kg/m²   Results for orders placed or performed during the hospital encounter of 05/29/24   Comprehensive Metabolic Panel    Specimen: Blood   Result Value Ref Range    Glucose 135 (H) 65 - 99 mg/dL    BUN 94 (H) 8 - 23 mg/dL    Creatinine 8.56 (H) 0.57 - 1.00 mg/dL    Sodium 118 (C) 136 - 145 mmol/L    Potassium 6.9 (C) 3.5 - 5.2 mmol/L    Chloride 89 (L) 98 - 107 mmol/L    CO2 8.0 (C) 22.0 - 29.0 mmol/L    Calcium 9.2 8.6 - 10.5 mg/dL    Total Protein 6.2 6.0 - 8.5 g/dL    Albumin 3.9 3.5 - 5.2 g/dL    ALT (SGPT) 11 1 - 33 U/L    AST (SGOT) 14 1 - 32 U/L    Alkaline Phosphatase 74 39 - 117 U/L    Total Bilirubin 0.7 0.0 - 1.2 mg/dL    Globulin 2.3 gm/dL    A/G Ratio 1.7 g/dL    BUN/Creatinine Ratio 11.0 7.0 - 25.0    Anion Gap 21.0 (H) 5.0 - 15.0 mmol/L    eGFR 4.5 (L) >60.0 mL/min/1.73   CBC Auto Differential    Specimen: Blood   Result Value Ref Range    WBC 4.54 3.40 - 10.80 10*3/mm3    RBC 2.86 (L) 3.77 - 5.28 10*6/mm3    Hemoglobin 8.3 (L) 12.0 - 15.9 g/dL    Hematocrit 24.9 (L) 34.0 - 46.6 %    MCV 87.1 79.0 - 97.0 fL    MCH 29.0 26.6 - 33.0 pg    MCHC 33.3 31.5 - 35.7 g/dL    RDW 13.2 12.3 - 15.4 %    RDW-SD 41.8 37.0 - 54.0 fl    MPV 9.9 6.0 - 12.0 fL    Platelets 260 140 - 450 10*3/mm3    Neutrophil % 82.4 (H) 42.7 - 76.0 %    Lymphocyte % 12.3 (L) 19.6 - 45.3 %    Monocyte % 0.9 (L) 5.0 - 12.0 %    Eosinophil % 4.0 0.3 - 6.2 %    Basophil % 0.2 0.0 - 1.5 %    Immature Grans % 0.2 0.0 - 0.5 %    Neutrophils, Absolute 3.74 1.70 - 7.00 10*3/mm3    Lymphocytes, Absolute 0.56 (L) 0.70 - 3.10 10*3/mm3    Monocytes, Absolute 0.04 (L) 0.10 - 0.90 10*3/mm3    Eosinophils, Absolute 0.18 0.00 - 0.40 10*3/mm3    Basophils, Absolute 0.01 0.00 - 0.20 10*3/mm3    Immature Grans, Absolute 0.01 0.00 - 0.05 10*3/mm3    nRBC 0.0 0.0 - 0.2 /100 WBC   Lactic Acid, Plasma    Specimen: Blood   Result Value Ref Range    Lactate 1.7 " 0.5 - 2.0 mmol/L     Medications   ondansetron (ZOFRAN) injection 4 mg (has no administration in time range)   sodium zirconium cyclosilicate (LOKELMA) packet 10 g (has no administration in time range)   insulin regular (humuLIN R,novoLIN R) injection 7 Units (has no administration in time range)   dextrose (D50W) (25 g/50 mL) IV injection 25 g (has no administration in time range)   calcium gluconate 1000 Mg/50ml 0.675% NaCl IV SOLN (has no administration in time range)   sodium bicarbonate injection 8.4% 100 mEq (has no administration in time range)   albuterol (PROVENTIL) nebulizer solution 0.083% 2.5 mg/3mL (has no administration in time range)   ondansetron ODT (ZOFRAN-ODT) disintegrating tablet 4 mg (4 mg Oral Given 5/29/24 1814)     XR Abdomen KUB    Result Date: 5/29/2024  Narrative: XR ABDOMEN KUB-  Date of Exam: 5/29/2024 4:12 PM  Indication: constipation x3 weeks; abd pain  Comparison: None available.  Findings: Sacral stimulator overlies the right pelvis. Right upper quadrant surgical clips. There is no evidence of bowel obstruction. Mild distal colonic stool burden. Included lung bases show no acute abnormality. Lumbar spondylosis. No evidence of fracture or suspicious bony lesion.      Impression: Impression: No evidence of bowel obstruction. Mild distal colonic stool burden.   Electronically Signed By-Corona Zheng MD On:5/29/2024 4:27 PM       MDM: Patient is in critical condition.  Patient be admitted to the ICU.  Patient started insulin drip.  Patient will require acute dialysis.    Procedures    All labs were reviewed and interpreted by me.  All X-rays impressions were independently interpreted by me.  EKG was interpreted by me.  CT scan radiology impression was interpreted by me.      SHARED VISIT ATTESTATION:    This visit was performed by both myself and an APC.  I performed the substantive portion of the medical decision making. The management plan was made or approved by me, and I take  responsibility for patient management.           Devin Espitia DO  19:02 EDT  05/29/24         Devin Espitia DO  06/01/24 [id number]

## 2024-05-29 NOTE — ED PROVIDER NOTES
"Time: 4:13 PM EDT  Date of encounter:  5/29/2024  Room number: I06/1  Independent Historian/Clinical History and Information was obtained by:   Patient    History is limited by: N/A    Chief Complaint: ab pain/constipation       History of Present Illness:  Patient is a 73 y.o. year old female who presents to the emergency department for evaluation of abdominal pain and constipation.  Abdominal pain started this morning but patient reports she has not had a bowel movement for 3 weeks.  She is able to pass gas but has had no vomiting.  She does confirm that she takes oxycodone for her knee pain.  She has been on the opioid for 1 year.  She denies history of obstruction or chronic constipation. She has also had a decrease in urine output.     HPI    Patient Care Team  Primary Care Provider: Colin Jamil MD    Past Medical History:     Allergies   Allergen Reactions    [place] Hives     Past Medical History:   Diagnosis Date    Acid reflux     Ankle fracture 2020    Bladder disorder     Breast cancer     Right    Cancer     Colitis     Diabetes     Diabetes mellitus     DVT (deep venous thrombosis)     GERD (gastroesophageal reflux disease)     History of spinal fracture 11/2020    Hyperlipemia     Recurrent UTI (urinary tract infection)     Renal calculus or stone     Seasonal allergies     Trigger thumb of right hand 06/17/2021    Urinary incontinence in female      Past Surgical History:   Procedure Laterality Date    ABDOMINAL HYSTERECTOMY      BACK SURGERY      HERNIATED DISCS  X3 OR 4 TIMES     BLADDER REPAIR  2002    BRAIN SURGERY      \"I had blood on my brain\"    BREAST BIOPSY      BREAST LUMPECTOMY Right     breast mass    MING HOLE N/A 06/16/2023    Procedure: LEFT SIDED MING HOLE;  Surgeon: Prieto Castillo MD;  Location: [facility];  Service: Neurosurgery;  Laterality: N/A;    CHOLECYSTECTOMY      COLONOSCOPY      COLONOSCOPY N/A 07/24/2023    Procedure: COLONOSCOPY;  Surgeon: Lee Ann Rios " MD Gregoria;  Location: [facility] ENDOSCOPY;  Service: Gastroenterology;  Laterality: N/A;  DIVERTICULOSIS, HEMORRHOIDS    CYSTOSCOPY      with dilation    ENDOSCOPY N/A 07/24/2023    Procedure: ESOPHAGOGASTRODUODENOSCOPY;  Surgeon: Lee Ann Rios MD;  Location: [facility] ENDOSCOPY;  Service: Gastroenterology;  Laterality: N/A;  HIATAL HERNIA    GALLBLADDER SURGERY      INTERSTIM PLACEMENT  2018-19?    INTRAOCULAR LENS INSERTION      TRIGGER FINGER RELEASE Right 06/17/2021    Procedure: RIGHT FINGER TRIGGER THUMB RELEASE;  Surgeon: Eyad Griffith MD;  Location: [facility] OR [facility];  Service: Orthopedics;  Laterality: Right;    VAGINAL MESH REVISION      vaginal approach per Dr. Liao     Family History   Problem Relation Age of Onset    Cancer Mother         unspecified    Breast cancer Mother         30s    Heart disease Brother     Cancer Brother         unspecified    Diabetes Brother         unspecified type    Malig Hyperthermia Neg Hx        Home Medications:  Prior to Admission medications    Medication Sig Start Date End Date Taking? Authorizing Provider   amitriptyline (ELAVIL) 25 MG tablet Take 1 tablet by mouth Every Night.    Ryne Reilly MD   apixaban (ELIQUIS) 5 MG tablet tablet Take 1 tablet by mouth Every 12 (Twelve) Hours. Indications: DVT/PE (active thrombosis) 7/26/23   Fabrizio Abraham PA   atorvastatin (LIPITOR) 40 MG tablet Take 1 tablet by mouth Daily.    Ryne Reilly MD   calcium carbonate (OS-CHAPARRO) 600 MG tablet Take 1 tablet by mouth 2 (Two) Times a Day With Meals.    Ryne Reilly MD   carvedilol (COREG) 6.25 MG tablet Take 1 tablet by mouth 2 (Two) Times a Day With Meals. 2/10/23   Fabrizio Abraham PA   cyclobenzaprine (FLEXERIL) 10 MG tablet Take 1 tablet by mouth 3 (Three) Times a Day As Needed for Muscle Spasms.    Ryne Reilly MD   ferrous sulfate (FerrouSul) 325 (65 FE) MG tablet Take 1 tablet by mouth Daily With Breakfast. 7/26/23   Fabrizio Abraham  MICHELLE GARRISON   fesoterodine fumarate (TOVIAZ ER) 8 MG tablet sustained-release 24 hour tablet Take 1 tablet by mouth Daily.    Ryne Reilly MD   HYDROcodone-acetaminophen (NORCO) 7.5-325 MG per tablet 1 tab(s) PO Q6HR PRN    Ryne Reilly MD   levETIRAcetam (KEPPRA) 1000 MG tablet Take 1 tablet by mouth Every 12 (Twelve) Hours. 6/21/23   Michael Pacheco MD   levothyroxine (SYNTHROID, LEVOTHROID) 25 MCG tablet Take 1 tablet by mouth Every Morning.    Ryne Reilly MD   lisinopril (PRINIVIL,ZESTRIL) 10 MG tablet Take 1 tablet by mouth Daily. 2/10/23   Fabrizio Abraham PA   olopatadine (PATANOL) 0.1 % ophthalmic solution 1 drop 2 (Two) Times a Day.    Ryne Reilly MD   pantoprazole (PROTONIX) 40 MG EC tablet Take 1 tablet by mouth Every Morning. 7/27/23   Fabrizio Abraham PA   traZODone (DESYREL) 100 MG tablet Take 1 tablet by mouth Every Night.    Ryne Reilly MD        Social History:   Social History     Tobacco Use    Smoking status: Former     Current packs/day: 1.00     Types: Cigarettes     Passive exposure: Past    Tobacco comments:     quit smoking at age 48   Vaping Use    Vaping status: Never Used   Substance Use Topics    Alcohol use: Never    Drug use: Never         Review of Systems:  Review of Systems   Constitutional:  Negative for chills and fever.   HENT:  Negative for congestion, ear pain and sore throat.    Eyes:  Negative for pain.   Respiratory:  Negative for cough, chest tightness and shortness of breath.    Cardiovascular:  Negative for chest pain.   Gastrointestinal:  Positive for abdominal pain and constipation. Negative for abdominal distention, diarrhea, nausea and vomiting.   Genitourinary:  Negative for flank pain and hematuria.   Musculoskeletal:  Negative for joint swelling.   Skin:  Negative for pallor.   Neurological:  Negative for seizures and headaches.   All other systems reviewed and are negative.       Physical Exam:  BP (!) 84/69   " Pulse (!) 0   Temp 98.1 °F (36.7 °C)   Resp 24   Ht 162.6 cm (64\")   Wt 57.3 kg (126 lb 5.2 oz)   SpO2 93%   BMI 21.68 kg/m²     Physical Exam  Vitals and nursing note reviewed.   Constitutional:       General: She is not in acute distress.     Appearance: Normal appearance. She is not toxic-appearing.   HENT:      Head: Normocephalic and atraumatic.      Mouth/Throat:      Mouth: Mucous membranes are moist.   Eyes:      General: No scleral icterus.  Cardiovascular:      Rate and Rhythm: Normal rate and regular rhythm.      Pulses: Normal pulses.      Heart sounds: Normal heart sounds.   Pulmonary:      Effort: Pulmonary effort is normal. No respiratory distress.      Breath sounds: Normal breath sounds. No stridor. No wheezing, rhonchi or rales.   Chest:      Chest wall: No tenderness.   Abdominal:      General: Abdomen is flat.      Palpations: Abdomen is soft.      Tenderness: There is generalized no abdominal tenderness.      Comments: Pt's position of comfort is laying on her left side.    Musculoskeletal:         General: Normal range of motion.      Cervical back: Normal range of motion and neck supple.   Skin:     General: Skin is warm and dry.      Coloration: Skin is not cyanotic, jaundiced, mottled or pale.      Findings: No erythema or rash.   Neurological:      Mental Status: She is alert and oriented to person, place, and time. Mental status is at baseline.                  Procedures:  Procedures      Medical Decision Making:      Comorbidities that affect care:    DVT, urinary incontinence, recurrent UTIs, GERD, diabetes, cancer, bladder disorder, hyperlipidemia, diabetes , chronic knee pain    External Notes reviewed:    Previous Clinic Note: Reviewed patient's last visit with: colon surgeon at [facility] which occurred on 5/10/2024 when pt was found to be in \"good shape.\" and Previous Labs: reviewed labs from 7/25/2023      The following orders were placed and all results were independently " analyzed by me:  Orders Placed This Encounter   Procedures    Intubation    Respiratory Panel PCR w/COVID-19(SARS-CoV-2) [facility] In-House, NP Swab in UTM/VTM, 2 HR TAT - Swab, Nasopharynx    S. Pneumo Ag Urine or CSF - Urine, Urine, Clean Catch    Legionella Antigen, Urine - Urine, Urine, Clean Catch    MRSA Screen, PCR (Inpatient) - Swab, Nares    Blood Culture - Blood,    Blood Culture - Blood,    XR Abdomen KUB    CT Abdomen Pelvis Without Contrast    XR Chest 1 View    XR Chest 1 View    Comprehensive Metabolic Panel    CBC Auto Differential    Lactic Acid, Plasma    CBC Auto Differential    Comprehensive Metabolic Panel    Basic Metabolic Panel    Lactate Dehydrogenase    Hepatitis B Surface Antigen    Hepatitis B Surface Antibody    Phosphorus    Magnesium    Iron Profile    Manual Differential    High Sensitivity Troponin T    ABG with Co-Ox and Electrolytes    Hemoglobin & Hematocrit, Blood    Protime-INR    aPTT    Fibrinogen    ABG with Co-Ox and Electrolytes    High Sensitivity Troponin T 2Hr    Lactic Acid, Plasma    Comprehensive Metabolic Panel    VBG with Co-Ox and Electrolytes    STAT Lactic Acid, Reflex    ABG with Co-Ox and Electrolytes    Hemoglobin & Hematocrit, Blood    STAT Lactic Acid, Reflex    ABG with Co-Ox and Electrolytes    Insert Indwelling Urinary Catheter    Verify Informed Consent for Blood Product Administration    Verify Informed Consent for Blood Product Administration    Contact [facility] Prior to Extubation    Verify Discontinuation of Orders    Discontinue Paralytics & Sedation    Discontinue Restraints (If Present)    Post-Extubation Nursing Interventions    Inpatient Nephrology Consult    Inpatient Nutrition Consult    Inpatient Palliative Care Team Consult    Extubation    Extubation    POC Glucose Once    POC Glucose Once    POC Glucose Once    ECG 12 Lead Electrolyte Imbalance    ECG 12 Lead Bradycardia    Adult Transthoracic Echo Complete W/ Cont if Necessary  Per Protocol    Type & Screen    Inpatient Admission    Inpatient Admission    CBC & Differential       Medications Given in the Emergency Department:  Medications   albuterol (PROVENTIL) (2.5 MG/3ML) 0.083% nebulizer solution  - ADS Override Pull (  Not Given 5/30/24 0006)   ondansetron ODT (ZOFRAN-ODT) disintegrating tablet 4 mg (4 mg Oral Given 5/29/24 1814)   sodium zirconium cyclosilicate (LOKELMA) packet 10 g (10 g Oral Given 5/29/24 1909)   insulin regular (humuLIN R,novoLIN R) injection 7 Units (7 Units Intravenous Given 5/29/24 1908)   dextrose (D50W) (25 g/50 mL) IV injection 25 g (25 g Intravenous Given 5/29/24 1907)   calcium gluconate 1000 Mg/50ml 0.675% NaCl IV SOLN (0 mg Intravenous Stopped 5/29/24 1918)   sodium bicarbonate injection 8.4% 100 mEq (100 mEq Intravenous Given 5/29/24 1907)   albuterol (PROVENTIL) nebulizer solution 0.083% 2.5 mg/3mL (7.5 mg Nebulization Given 5/29/24 1916)   sodium chloride 0.9 % bolus 1,000 mL (1,000 mL Intravenous New Bag 5/29/24 2003)   lactated ringers bolus 1,000 mL (1,000 mL Intravenous New Bag 5/29/24 2230)   Atropine Sulfate injection 0.4 mg (0.4 mg Intravenous Given 5/30/24 0438)   naloxone (NARCAN) injection 0.4 mg (0.4 mg Intravenous Given 5/30/24 0439)   sodium bicarbonate injection 8.4% 50 mEq (50 mEq Intravenous Given 5/30/24 0443)   sodium bicarbonate injection 8.4% 50 mEq (50 mEq Intravenous Given 5/30/24 0443)   insulin regular (humuLIN R,novoLIN R) injection 10 Units (10 Units Subcutaneous Given 5/30/24 0502)   calcium gluconate 1000 Mg/50ml 0.675% NaCl IV SOLN (1,000 mg Intravenous New Bag 5/30/24 0511)   etomidate (AMIDATE) injection 20 mg (20 mg Intravenous Given 5/30/24 0456)   rocuronium (ZEMURON) injection 70 mg (70 mg Intravenous Given 5/30/24 4059)   cefepime 2000 mg IVPB in 100 mL NS (VTB) (2,000 mg Intravenous New Bag 5/30/24 1059)   vancomycin 1250 mg/250 mL 0.9% NS IVPB ([facility]) (1,250 mg Intravenous New Bag 5/30/24 3298)   calcium gluconate  1000 Mg/50ml 0.675% NaCl IV SOLN (1,000 mg Intravenous New Bag 5/30/24 1037)        ED Course:    ED Course as of 05/31/24 2131   Wed May 29, 2024   1636 Patient's KUB does not indicate any obstruction or impaction however she is very tender on exam.  CT to be completed to rule out other possible sources of discomfort [MS]   1752 Order placed for consult for hospital admission at this time.  [MS]   1757 Spoke with Dr. Yan who has agreed to admit pt to hospital. He would like to proceed with CT of abdomen and pelvis with oral contrast. CT department notified.  [MS]   1807 Pt is agreeable to hospital admission  [MS]   1844 Dr. Schilling has been consulted by admitting physician regarding patient's potassium.  He requested a stat BMP to be completed because first blood sample, the CMP, appear to be hemolyzed. [MS]   1848 Nursing staff has been asked to please complete the BMP stat so that final bed disposition (ICU versus MedSurg) [MS]   1900 Dr. Schilling has requested patient be placed in ICU bed.  This was discussed with ER attending Dr. Espitia and message sent to bedboard [MS]      ED Course User Index  [MS] Meena Mazariegos APRN       Labs:    Lab Results (last 24 hours)       ** No results found for the last 24 hours. **             Imaging:    No Radiology Exams Resulted Within Past 24 Hours      Differential Diagnosis and Discussion:    Abdominal Pain: Based on the patient's signs and symptoms, I considered abdominal aortic aneurysm, small bowel obstruction, pancreatitis, acute cholecystitis, acute appendecitis, peptic ulcer disease, gastritis, colitis, endocrine disorders, irritable bowel syndrome and other differential diagnosis an etiology of the patient's abdominal pain.  Metabolic: Differential diagnosis includes but is not limited to hypertension, hyperglycemia, hyperkalemia, hypocalcemia, metabolic acidosis, hypokalemia, hypoglycemia, malnutrition, hypothyroidism, hyperthyroidism, and adrenal  insufficiency.     All labs were reviewed and interpreted by me.  All X-rays impressions were independently interpreted by me.    MDM     Amount and/or Complexity of Data Reviewed  Decide to obtain previous medical records or to obtain history from someone other than the patient: yes         Critical Care Note: Total Critical Care time of 45 minutes. Total critical care time documented does not include time spent on separately billed procedures for services of nurses or physician assistants. I personally saw and examined the patient. I have reviewed all diagnostic interpretations and treatment plans as written. I was present for the key portions of any procedures performed and the inclusive time noted in any critical care statement. Critical care time includes patient management by me, time spent at the patients bedside,  time to review lab and imaging results, discussing patient care, documentation in the medical record, and time spent with family or caregiver.        Patient Care Considerations:    ANTIBIOTICS: I considered prescribing antibiotics as an outpatient however no bacterial focus of infection was found.      Consultants/Shared Management Plan:    Hospitalist: Discussed appropriate bed placement with Dr. Schilling. He also requested a repeat in BMP with concerns that the first lab was hemolyzed. He also advised he will contact all appropriate parties for emergent dialysis.   Hospitalist: I have discussed the case with Dr. Yan who agrees to accept the patient for admission.  SHARED VISIT: I have discussed the case with my supervising physician, Dr. Espitia  who stateswould like to have hyperkalemia meds initiated immediatelyonce IV status has been obtained and until then proceed with albuterol treatment. The substantive portion of the medical decision was made by the attesting physician who made or approve the management plan and will take responsibility for the patient.  Clinical findings were discussed  and ultimate disposition was made in consult with supervising physician.    Social Determinants of Health:    Patient is independent, reliable, and has access to care.       Disposition and Care Coordination:    Admit:   Through independent evaluation of the patient's history, physical, and imperical data, the patient meets criteria for inpatient admission to the hospital.        Final diagnoses:   Acute renal failure, unspecified acute renal failure type   Hyperkalemia   Hyponatremia   Generalized abdominal pain        ED Disposition       ED Disposition   Decision to Admit    Condition   --    Comment   Level of Care: Critical Care [6]   Diagnosis: Acute renal failure [650923]   Admitting Physician: OSMANI MUSTAFA [[id number]]   Attending Physician: OSMANI MUSTAFA [[id number]]   Certification: I Certify That Inpatient Hospital Services Are Medically Necessary For Greater Than 2 Midnights                 This medical record created using voice recognition software.       Meena Mazariegos, APRN  05/31/24 1219

## 2024-05-29 NOTE — H&P
"[facility]   HISTORY AND PHYSICAL    Patient Name: Rebecca Mchugh  : 1950  MRN: [id number]  Primary Care Physician:  Colin Jamil MD  Date of admission: 2024    Subjective   Subjective     Chief Complaint:   Abdominal pain, constipation, weakness      HPI:    Rebecca Mchugh is a 73 y.o. female who reported to ER for evaluation abdominal pain and constipation.  According to patient started recently, complains of constipation for 2 to 3 weeks.  Patient has been weak and lethargic for several days.  According to the daughter at bedside she is not doing good for last 1 year.  She had increased swelling of legs.  No blood in the stools no black-colored stools.  In the ER revealed acute renal failure with hyperkalemia, treatment for hyperkalemia being started now.        Review of Systems:      No fever chills  No chest pain  Some abdominal discomfort  Swelling of legs  No nausea vomiting  Poor appetite  Weight loss    Personal History     Past Medical History:   Diagnosis Date    Acid reflux     Ankle fracture     Bladder disorder     Breast cancer     Right    Cancer     Colitis     Diabetes     Diabetes mellitus     DVT (deep venous thrombosis)     GERD (gastroesophageal reflux disease)     History of spinal fracture 2020    Hyperlipemia     Recurrent UTI (urinary tract infection)     Renal calculus or stone     Seasonal allergies     Trigger thumb of right hand 2021    Urinary incontinence in female        Past Surgical History:   Procedure Laterality Date    ABDOMINAL HYSTERECTOMY      BACK SURGERY      HERNIATED DISCS  X3 OR 4 TIMES     BLADDER REPAIR      BRAIN SURGERY      \"I had blood on my brain\"    BREAST BIOPSY      BREAST LUMPECTOMY Right     breast mass    MING HOLE N/A 2023    Procedure: LEFT SIDED MING HOLE;  Surgeon: Prieto Castillo MD;  Location: [facility];  Service: Neurosurgery;  Laterality: N/A;    CHOLECYSTECTOMY      COLONOSCOPY      COLONOSCOPY " N/A 07/24/2023    Procedure: COLONOSCOPY;  Surgeon: Lee Ann Rios MD;  Location: [facility] ENDOSCOPY;  Service: Gastroenterology;  Laterality: N/A;  DIVERTICULOSIS, HEMORRHOIDS    CYSTOSCOPY      with dilation    ENDOSCOPY N/A 07/24/2023    Procedure: ESOPHAGOGASTRODUODENOSCOPY;  Surgeon: Lee Ann Rios MD;  Location: [facility] ENDOSCOPY;  Service: Gastroenterology;  Laterality: N/A;  HIATAL HERNIA    GALLBLADDER SURGERY      INTERSTIM PLACEMENT  2018-19?    INTRAOCULAR LENS INSERTION      TRIGGER FINGER RELEASE Right 06/17/2021    Procedure: RIGHT FINGER TRIGGER THUMB RELEASE;  Surgeon: Eyad Griffith MD;  Location: [facility] OR [facility];  Service: Orthopedics;  Laterality: Right;    VAGINAL MESH REVISION      vaginal approach per Dr. Liao       Family History: family history includes Breast cancer in her mother; Cancer in her brother and mother; Diabetes in her brother; Heart disease in her brother. Otherwise pertinent FHx was reviewed and not pertinent to current issue.    Social History:  reports that she has quit smoking. Her smoking use included cigarettes. She has been exposed to tobacco smoke. She does not have any smokeless tobacco history on file. She reports that she does not drink alcohol and does not use drugs.    Home Medications:  HYDROcodone-acetaminophen, amitriptyline, apixaban, atorvastatin, calcium carbonate, carvedilol, cyclobenzaprine, ferrous sulfate, fesoterodine fumarate, levETIRAcetam, levothyroxine, lisinopril, olopatadine, pantoprazole, and traZODone      Allergies:  Allergies   Allergen Reactions    Strawberry Hives       Objective   Objective     Vitals:   Temp:  [97.7 °F (36.5 °C)] 97.7 °F (36.5 °C)  Heart Rate:  [69-72] 72  Resp:  [18] 18  BP: (82-97)/(46-66) 97/48    Physical Exam  Elderly female, acute distress but tired looking.  Pallor noted.  Heart rate.  Lungs diminished breath sounds.  Abdomen soft but diffusely tender.  No guarding no peritoneal signs.  Extremities  with edema in both lower extremities.  No calf tenderness        I have personally reviewed the results from the time of this admission to 5/29/2024 19:33 EDT and agree with these findings:  [x]  Laboratory  []  Microbiology  []  Radiology  []  EKG/Telemetry   []  Cardiology/Vascular   []  Pathology  []  Old records  []  Other:    CBC:    WBC   Date Value Ref Range Status   05/29/2024 4.54 3.40 - 10.80 10*3/mm3 Final     RBC   Date Value Ref Range Status   05/29/2024 2.86 (L) 3.77 - 5.28 10*6/mm3 Final     Hemoglobin   Date Value Ref Range Status   05/29/2024 8.3 (L) 12.0 - 15.9 g/dL Final     Hematocrit   Date Value Ref Range Status   05/29/2024 24.9 (L) 34.0 - 46.6 % Final     MCV   Date Value Ref Range Status   05/29/2024 87.1 79.0 - 97.0 fL Final     MCH   Date Value Ref Range Status   05/29/2024 29.0 26.6 - 33.0 pg Final     MCHC   Date Value Ref Range Status   05/29/2024 33.3 31.5 - 35.7 g/dL Final     RDW   Date Value Ref Range Status   05/29/2024 13.2 12.3 - 15.4 % Final     RDW-SD   Date Value Ref Range Status   05/29/2024 41.8 37.0 - 54.0 fl Final     MPV   Date Value Ref Range Status   05/29/2024 9.9 6.0 - 12.0 fL Final     Platelets   Date Value Ref Range Status   05/29/2024 260 140 - 450 10*3/mm3 Final     Neutrophil %   Date Value Ref Range Status   05/29/2024 82.4 (H) 42.7 - 76.0 % Final     Lymphocyte %   Date Value Ref Range Status   05/29/2024 12.3 (L) 19.6 - 45.3 % Final     Monocyte %   Date Value Ref Range Status   05/29/2024 0.9 (L) 5.0 - 12.0 % Final     Eosinophil %   Date Value Ref Range Status   05/29/2024 4.0 0.3 - 6.2 % Final     Basophil %   Date Value Ref Range Status   05/29/2024 0.2 0.0 - 1.5 % Final     Immature Grans %   Date Value Ref Range Status   05/29/2024 0.2 0.0 - 0.5 % Final     Neutrophils, Absolute   Date Value Ref Range Status   05/29/2024 3.74 1.70 - 7.00 10*3/mm3 Final     Lymphocytes, Absolute   Date Value Ref Range Status   05/29/2024 0.56 (L) 0.70 - 3.10 10*3/mm3  Final     Monocytes, Absolute   Date Value Ref Range Status   05/29/2024 0.04 (L) 0.10 - 0.90 10*3/mm3 Final     Eosinophils, Absolute   Date Value Ref Range Status   05/29/2024 0.18 0.00 - 0.40 10*3/mm3 Final     Basophils, Absolute   Date Value Ref Range Status   05/29/2024 0.01 0.00 - 0.20 10*3/mm3 Final     Immature Grans, Absolute   Date Value Ref Range Status   05/29/2024 0.01 0.00 - 0.05 10*3/mm3 Final     nRBC   Date Value Ref Range Status   05/29/2024 0.0 0.0 - 0.2 /100 WBC Final        BMP:    Lab Results   Component Value Date    GLUCOSE 135 (H) 05/29/2024    BUN 94 (H) 05/29/2024    CREATININE 8.56 (H) 05/29/2024    BCR 11.0 05/29/2024    K 6.9 (C) 05/29/2024    CO2 8.0 (C) 05/29/2024    CALCIUM 9.2 05/29/2024    ALBUMIN 3.9 05/29/2024    LABIL2 1.5 09/26/2019    AST 14 05/29/2024    ALT 11 05/29/2024        XR Abdomen KUB    Result Date: 5/29/2024  Impression: No evidence of bowel obstruction. Mild distal colonic stool burden.   Electronically Signed By-Corona Zheng MD On:5/29/2024 4:27 PM              Assessment & Plan   Assessment / Plan       Current Diagnosis:  Active Hospital Problems    Diagnosis     **ARF (acute renal failure)     Acute renal failure     Hyperkalemia     Seizure disorder     Anemia     DM (diabetes mellitus), type 2      Plan:   Admit to ICU.  Emergent dialysis, hyperkalemia treatment initiated in ER.  Patient also anemic.  New onset kidney failure.  Will consult nephrologist, Rosa Elena Ospina notified.  IV fluids, monitor sugars.  Further management based on clinical course      DVT prophylaxis:  Medical DVT prophylaxis orders are signed and held.          GI Prophylaxis:       Pepcid    CODE STATUS:    Code Status (Patient has no pulse and is not breathing): CPR (Attempt to Resuscitate)  Medical Interventions (Patient has pulse or is breathing): Full Support    Admission Status:  I believe this patient meets inpatient status.             I have dictated this note utilizing  Dragon Dictation.             Please note that portions of this note were completed with a voice recognition program.             Part of this note may be an electronic transcription/translation of spoken language to printed text         using the Dragon Dictation System.       Electronically signed by Jaya Yan MD, 05/29/24, 7:30 PM EDT.    Total time spent with in evaluation and management:

## 2024-05-29 NOTE — Clinical Note
Level of Care: Med/Surg [1]   Diagnosis: ARF (acute renal failure) [634078]   Admitting Physician: OSMANI MUSTAFA [[id number]]   Attending Physician: OSMANI MUSTAFA [[id number]]   Certification: I Certify That Inpatient Hospital Services Are Medically Necessary For Greater Than 2 Midnights

## 2024-05-30 NOTE — CONSULTS
05/30/24 1027   Coping/Psychosocial   Additional Documentation Spiritual Care (Group)   Spiritual Care   Spiritual Care Source nurse referral   Spiritual Care Follow-Up will follow closely   Spiritual Care Interventions other (see comments)  (no family present this morning but family has been called by nurse and updated on declining status.  updated by medical team.)   Spiritual Care Visit Type other (see comments)  (pt is critical on vent support in ICU)   Spiritual Care Request family support

## 2024-05-30 NOTE — PROCEDURES
CENTRAL LINE PLACEMENT NOTE  Indication: hemodynamic instability  Consent obtained: placed emergently  Time out: performed w/ RN at bedside.     Procedure:   The patient was placed in the supine position and the skin over the patient's right femoral vein was prepped with chlorhexidine. The patient was draped with full body drape, sterile procedure was used.  The skin was infiltrated with local anesthesia over the insertion site with 5 mL of 1% lidocaine under ultrasound guidance.  Large-bore needle was used to cannulate the vessel with return of dark blood.  The guidewire was inserted into the needle using the Seldinger technique and then needle was removed. Ultrasound was used to confirm the wire was in the vein. A small nick was made in the skin & vessel was dilated w/ dilator. A triple-lumen catheter was then placed into the vessel over the wire & wire was removed. All 3 ports freely flush & draw.  The catheter was securely fastened to the skin with suture. Next a sterile dressing was placed & a CXR ordered to confirm positioning of the line.     The patient tolerated the procedure well  Complications: no immediate complications noted      Procedure name: Ultrasound guidance for central line placement  Ultrasound was used to confirm cannulation of the right femoral vein with the finder needle during central line placement. Vessel was reviewed with ultrasound prior to procedure and was patent and lied just next to the right femoral artery. Ultrasound was used to assure correct placement of cannulating needle and picture was taken of guidewire being in correct placement prior to dilating vessel and image was uploaded to patient's electronic medical record.     Electronically signed by MICHELLE Yanes, 05/30/24, 11:46 AM EDT.

## 2024-05-30 NOTE — NURSING NOTE
Dr Cheney came to ICU 6 to intubate patient.     MD ordered 20 of etomidate and 70 of marvin.     Etomidate was given at 0456     Marvin was given at 0457    Pre intubation vitals were 130/53 HR 86    Post intubation vitals were 111/93 HR 70    02 was 100%.     Patient was intubated at 0458 with 7.5, chest xray was called for STAT.     Continue with current plan of care

## 2024-05-30 NOTE — CONSULTS
"Pulmonary / Critical Care Consult Note      Patient Name: Rebecca Mchugh  : 1950  MRN: [id number]  Primary Care Physician:  Colin Jamil MD  Referring Physician: Jaya Yan MD  Date of admission: 2024    Subjective   Subjective     Reason for Consult/ Chief Complaint:   Hyperkalemia, acute renal failure requiring emergent HD    HPI:  Rebecca Mchugh is a 73 y.o. female with history of HTN presents to the ED for abdominal pain. She reports associated nausea and vomiting. Symptoms started today which has progressed. On arrival patient was tachycardic and hypotensive. Initial labs significant for elevated K 6.9, Cr 8.56. Her previously renal function appeared to be normal. She reports chronic constipation with last bowel movement 1-2 weeks ago . She takes opiate for chronic pain at home. She denies fevers, chills, chest pain, shortness of breath. In the ED she was given calcium gluconate, insulin with dextrose, sodium bicarbonate, and lokelma.     Review of Systems  Constitutional symptoms:  Denied complaints   Cardiovascular:  Denied complaints  Respiratory: Denied complaints  Gastrointestinal: +abdominal pain, nausea, vomiting; Denied complaints  Neurologic: Denied complaints    Personal History     Past Medical History:   Diagnosis Date    Acid reflux     Ankle fracture     Bladder disorder     Breast cancer     Right    Cancer     Colitis     Diabetes     Diabetes mellitus     DVT (deep venous thrombosis)     GERD (gastroesophageal reflux disease)     History of spinal fracture 2020    Hyperlipemia     Recurrent UTI (urinary tract infection)     Renal calculus or stone     Seasonal allergies     Trigger thumb of right hand 2021    Urinary incontinence in female        Past Surgical History:   Procedure Laterality Date    ABDOMINAL HYSTERECTOMY      BACK SURGERY      HERNIATED DISCS  X3 OR 4 TIMES     BLADDER REPAIR      BRAIN SURGERY      \"I had blood on my brain\"    BREAST " BIOPSY      BREAST LUMPECTOMY Right     breast mass    MING HOLE N/A 06/16/2023    Procedure: LEFT SIDED MING HOLE;  Surgeon: Prieto Castillo MD;  Location: [facility] OR;  Service: Neurosurgery;  Laterality: N/A;    CHOLECYSTECTOMY      COLONOSCOPY      COLONOSCOPY N/A 07/24/2023    Procedure: COLONOSCOPY;  Surgeon: Lee Ann Rios MD;  Location: [facility] ENDOSCOPY;  Service: Gastroenterology;  Laterality: N/A;  DIVERTICULOSIS, HEMORRHOIDS    CYSTOSCOPY      with dilation    ENDOSCOPY N/A 07/24/2023    Procedure: ESOPHAGOGASTRODUODENOSCOPY;  Surgeon: Lee Ann Rios MD;  Location: [facility] ENDOSCOPY;  Service: Gastroenterology;  Laterality: N/A;  HIATAL HERNIA    GALLBLADDER SURGERY      INTERSTIM PLACEMENT  2018-19?    INTRAOCULAR LENS INSERTION      TRIGGER FINGER RELEASE Right 06/17/2021    Procedure: RIGHT FINGER TRIGGER THUMB RELEASE;  Surgeon: Eyad Griffith MD;  [place] [facility] [place] [facility];  Service: Orthopedics;  Laterality: Right;    VAGINAL MESH REVISION      vaginal approach per Dr. Liao       Family History: family history includes Breast cancer in her mother; Cancer in her brother and mother; Diabetes in her brother; Heart disease in her brother. Otherwise pertinent FHx was reviewed and not pertinent to current issue.    Social History:  reports that she has quit smoking. Her smoking use included cigarettes. She has been exposed to tobacco smoke. She does not have any smokeless tobacco history on file. She reports that she does not drink alcohol and does not use drugs.    Home Medications:  HYDROcodone-acetaminophen, amitriptyline, apixaban, atorvastatin, calcium carbonate, carvedilol, cyclobenzaprine, ferrous sulfate, fesoterodine fumarate, levETIRAcetam, levothyroxine, lisinopril, olopatadine, pantoprazole, and traZODone    Allergies:  Allergies   Allergen Reactions    Strawberry Hives       Objective    Objective     Vitals:   Temp:  [97.7 °F (36.5 °C)] 97.7 °F (36.5 °C)  Heart Rate:   [67-72] 70  Resp:  [17-18] 18  BP: (80-97)/(46-66) 80/60    Physical Exam:  Vital Signs Reviewed   General:  Alert, NAD. Lying in bed, elderly female  HEENT:  PERRL, EOMI.  OP  Neck:  Supple, no JVD, no thyromegaly  Chest:  clear to auscultation bilaterally, no work of breathing noted on room air  CV: RRR, no MGR, pulses 2+, equal.  Abd:  Soft, tender to palpation, + BS, no HSM  EXT:  no clubbing, no cyanosis, no edema  Neuro:  A&Ox3, CN grossly intact, no focal deficits.  Skin: No rashes or lesions noted    Result Review    Result Review:  I have personally reviewed the results from the time of this admission to 5/29/2024 20:36 EDT and agree with these findings:  []  Laboratory  []  Microbiology  []  Radiology  []  EKG/Telemetry   []  Cardiology/Vascular   []  Pathology  []  Old records  []  Other:  Most notable findings include:     Assessment & Plan   Assessment / Plan     Active Hospital Problems:  Active Hospital Problems    Diagnosis     **ARF (acute renal failure)     Acute renal failure     Hyperkalemia     Seizure disorder     Anemia     DM (diabetes mellitus), type 2          Impression:  Life threatening hyperkalemia  Acute renal failure  Hyponatremia  Anion Gap Metabolic acidosis  Hypotension  Anemia  Constipation  Elevated LDH  History of hypertension    Plan:  -Currently on RA; Keep SpO2 >90%  -Aggressive fluid resuscitation given acute renal failure which likely have a prerenal component seen by patient's collapsible IJ, vomiting, decreased oral intake, will discuss with renal   -RIJ HD catheter placed for emergent HD per renal  -Medically treat hyperkalemia with insulin, calcium, mag; No significant EKG changes noted  -Pressors: Can start levophed if needed to keep MAP >65 when starting HD  -Avoid nephrotoxic agents  -Hold home BP medications at this time  -Abd CT ordered, pending read  -Cont aspiration precautions. Keep HOB 30 deg.   -Replace electrolytes PRN to keep K 4.0, Mag 2.0, Phos 4.0.  -Keep  glucose 140-180 while in ICU. Cont SSI.  -Transfuse to keep Hgb >7  -low threshold to start broad spectrum antibiotics  -DVT ppx: Home eliquis  -GI ppx: PPI  -Lines: PIVs, RIJ HD Catheter    DVT prophylaxis:  Medical DVT prophylaxis orders are present.       Code Status and Medical Interventions:   Ordered at: 05/29/24 1805     Code Status (Patient has no pulse and is not breathing):    CPR (Attempt to Resuscitate)     Medical Interventions (Patient has pulse or is breathing):    Full Support        The patient is critically ill in the ICU with life threatening hyperkalemia, metabolic acidosis, acute renal failure requiring HD. Multidisciplinary bedside critical care rounds were performed with nursing staff, respiratory therapy, pharmacy, nutritional services, social work. I have personally reviewed the chart, labs and any pertinent imaging available.  I have spent 35 minutes of critical care time, excluding procedures, in the care of this patient.    Electronically signed by Layla Garcia MD, 05/29/24, 8:36 PM EDT.

## 2024-05-30 NOTE — CONSULTS
Purpose of the visit was to evaluate for: goals of care/advanced care planning, support for patient/family, and withdrawal of interventions. Spoke with RN, patient, and family and discussed palliative care, goals of care, care options, resuscitation status, and clarify code status.      Assessment: Palliative care was consulted for patient and family support. She presented tot he ED for evaluation of abd. Pain and constipation.  She became weak and lethargic.  She was admitted for further evaluation and treatment.   This course of treatment she was intubated and placed on medication for hemodynamic stability.   At the time of our visit, patient was on Vasopressin, levophed, marlen. And full support of the vent and CRRT d/t renal status  Patients granddaughter arrived, she was spoken to by PA. She has made the decision to transition to comfort focus care.   She was made aware of the transition process. All concerns addressed.  MD aware. New orders received and noted    Recommendations/Plan: .Transition to comfort focus care. To be extubated and removed from CRRT when patient family are ready.    Tasks Completed:.family support. Transition to comfort care. All orders placed.     Palliative care will continue to follow and support.  Adelina MADDEN RN, BSN  Palliative Care  .

## 2024-05-30 NOTE — CONSULTS
05/30/24 1336   Spiritual Care   Spiritual Care Source nurse referral   Spiritual Care Follow-Up will follow closely   Spiritual Care Interventions decision-making facilitated   Spiritual Care Visit Type other (see comments)  (family at bedside. pt continues to decline. CMO has been discussed with fam. Fam at [facility] state they want to continue aggressive treatment knowing that the pt will pass away.)   Spiritual Care Request family support;decision-making support

## 2024-05-30 NOTE — CONSULTS
[facility]   Consult Note    Patient Name: Rebecca Mchugh  : 1950  MRN: [id number]  Primary Care Physician:  Colin Jamil MD  Referring Physician: No Known Provider  Date of admission: 2024    Subjective   Subjective     Reason for Consult/ Chief Complaint: DEVEN    HPI:  Rebecca Mchugh is a 73 y.o. female 73-year-old female with past medical history of hypertension, seizure disorder, GERD, hyperlipidemia, anxiety/depression, atrial fibrillation on anticoagulation, hypothyroidism who came into the hospital due to abdominal pain and constipation.  Patient states that she has not had a bowel movement for 2 to 3 weeks.  She has been passing gas and has had episodes of nausea but no vomiting.  She had been se endorsing poor p.o. intake over the last 2 to 3 days and due to worsening of her symptoms presented to the ER    In the ER patient's blood pressures were soft however with time to continue to worsen.  Patient had CT scanning of the abdomen which showed some distention of the bowel loops in the spleen flexure of the colon noted to be thickened.  Patient had a normal white count.  Her hemoglobin was low at 8.  Her creatinine was noted to be elevated at 8.5 with a bicarb of 8 and a potassium of 7 and a sodium of 118 due to the severity of her electrolyte derangements nephrology was consulted.  Patient was urgently admitted to the ICU for dialysis.  Nephrology has been consulted for DEVEN    Review of Systems  All review of systems negative except as given below.    Personal History     Past Medical History:   Diagnosis Date    Acid reflux     Ankle fracture     Bladder disorder     Breast cancer     Right    Cancer     Colitis     Diabetes     Diabetes mellitus     DVT (deep venous thrombosis)     GERD (gastroesophageal reflux disease)     History of spinal fracture 2020    Hyperlipemia     Recurrent UTI (urinary tract infection)     Renal calculus or stone     Seasonal allergies      "Trigger thumb of right hand 06/17/2021    Urinary incontinence in female        Past Surgical History:   Procedure Laterality Date    ABDOMINAL HYSTERECTOMY      BACK SURGERY      HERNIATED DISCS  X3 OR 4 TIMES     BLADDER REPAIR  2002    BRAIN SURGERY      \"I had blood on my brain\"    BREAST BIOPSY      BREAST LUMPECTOMY Right     breast mass    MING HOLE N/A 06/16/2023    Procedure: LEFT SIDED MING HOLE;  Surgeon: Prieto Castillo MD;  Location: [facility] OR;  Service: Neurosurgery;  Laterality: N/A;    CHOLECYSTECTOMY      COLONOSCOPY      COLONOSCOPY N/A 07/24/2023    Procedure: COLONOSCOPY;  Surgeon: Lee Ann Rios MD;  Location: [facility] ENDOSCOPY;  Service: Gastroenterology;  Laterality: N/A;  DIVERTICULOSIS, HEMORRHOIDS    CYSTOSCOPY      with dilation    ENDOSCOPY N/A 07/24/2023    Procedure: ESOPHAGOGASTRODUODENOSCOPY;  Surgeon: Lee Ann Rios MD;  Location: [facility] ENDOSCOPY;  Service: Gastroenterology;  Laterality: N/A;  HIATAL HERNIA    GALLBLADDER SURGERY      INTERSTIM PLACEMENT  2018-19?    INTRAOCULAR LENS INSERTION      TRIGGER FINGER RELEASE Right 06/17/2021    Procedure: RIGHT FINGER TRIGGER THUMB RELEASE;  Surgeon: Eyad Griffith MD;  Location: [facility] OR OSC;  Service: Orthopedics;  Laterality: Right;    VAGINAL MESH REVISION      vaginal approach per Dr. Liao       Family History: family history includes Breast cancer in her mother; Cancer in her brother and mother; Diabetes in her brother; Heart disease in her brother. Otherwise pertinent FHx was reviewed and not pertinent to current issue.    Social History:  reports that she has quit smoking. Her smoking use included cigarettes. She has been exposed to tobacco smoke. She does not have any smokeless tobacco history on file. She reports that she does not drink alcohol and does not use drugs.    Home Medications:  HYDROcodone-acetaminophen, amitriptyline, apixaban, atorvastatin, calcium carbonate, carvedilol, " cyclobenzaprine, ferrous sulfate, fesoterodine fumarate, levETIRAcetam, levothyroxine, lisinopril, olopatadine, pantoprazole, and traZODone    Allergies:  Allergies   Allergen Reactions    Strawberry Hives       Objective    Objective     Vitals:   Temp:  [97.7 °F (36.5 °C)] 97.7 °F (36.5 °C)  Heart Rate:  [67-72] 71  Resp:  [17-18] 18  BP: (79-97)/(46-66) 79/47    Physical Exam:             Constitutional:         Awake, alert responsive, conversant, no obvious distress   Eyes:                       PERRLA, sclerae anicteric, no conjunctival injection   HEENT:                   Moist mucous membranes, no nasal or eye discharge, no throat congestion   Neck:                      Supple, no thyromegaly, no lymphadenopathy, trachea midline, no elevated JVD   Respiratory:           Clear to auscultation bilaterally, nonlabored respirations    Cardiovascular:     RRR, no murmurs, rubs, or gallops, palpable pedal pulses bilaterally, No bilateral ankle edema   Gastrointestinal:   Positive bowel sounds, soft, nontender, non-distended, no organomegaly   Musculoskeletal:  No clubbing or cyanosis to extremities, muscle wasting, joint swelling, muscle weakness   Psychiatric:              Appropriate affect, cooperative   Neurologic:            Awake alert, oriented x 3, strength symmetric in all extremities, Cranial Nerves grossly intact to confrontation, speech clear   Skin:                      No rashes, bruising, skin ulcers, petechiae or ecchymosis    Result Review    Result Review:  I have personally reviewed the results from the time of this admission to 5/29/2024 21:19 EDT and agree with these findings:  []  Laboratory  []  Microbiology  []  Radiology  []  EKG/Telemetry   []  Cardiology/Vascular   []  Pathology  []  Old records  []  Other:    Results from last 7 days   Lab Units 05/29/24  1648   WBC 10*3/mm3 4.54   HEMOGLOBIN g/dL 8.3*   PLATELETS 10*3/mm3 260     Results from last 7 days   Lab Units 05/29/24  1851  05/29/24  1648   SODIUM mmol/L 120* 118*   POTASSIUM mmol/L 7.0* 6.9*   CHLORIDE mmol/L 91* 89*   CO2 mmol/L 7.5* 8.0*   ANION GAP mmol/L 21.5* 21.0*   BUN mg/dL 98* 94*   CREATININE mg/dL 8.11* 8.56*   GLUCOSE mg/dL 107* 135*       Assessment & Plan   Assessment / Plan     Active Hospital Problems:  Active Hospital Problems    Diagnosis     **ARF (acute renal failure)     Acute renal failure     Hyperkalemia     Seizure disorder     Anemia     DM (diabetes mellitus), type 2      73-year-old female with past medical history of hypertension, seizure disorder, GERD, hyperlipidemia, anxiety/depression, atrial fibrillation on anticoagulation, hypothyroidism who came into the hospital due to abdominal pain and constipation noted to be in septic shock with hypotension and DEVEN with creatinine rise from baseline normal to peak of 8 with minimal urine output on Trujillo catheter and a bicarb of 8 with a normal lactate and potassium of 7 with a sodium of 120.  Imaging concerning for distended bowel loops and thickening of the splenic flexure of the colon.    Plan:   Will emergently dialyze the patient on 2K bath for 1.5 hours with sodium bath of 130 dialysate of 100 and blood flow of 1  to avoid overcorrection of sodium  Will give a total of 3 L of normal saline  Pressors to maintain MAP greater than 65  Will continue to monitor urine output  Iron profile  Hold antihypertensives  Will hold off any further IV fluids  Appreciate ICU's team help in managing the patient    35 minutes critical care taken in managing the patient  Patient seen on dialysis at 9:10 PM      Electronically signed by Rosa Elena Crawford MD, 05/29/24, 9:19 PM EDT.

## 2024-05-30 NOTE — PROCEDURES
Arterial line placement note  Procedure name: right radial arterial line placement  Indication for procedure: Shock, need for continuous hemodynamic monitoring  Consent: placed emergently  Time out: Performed with nursing staff prior to start of the procedure    Procedure description: Juan test was performed prior to procedure and found to be + for collateral circulation. After obtaining informed consent, the patient was prepped and draped in sterile drape during the procedure. The skin over the right radial artery site was cleansed with chlorhexidine preps. Manual palpation of the patient's radial artery was performed, finder needle/arrow catheter was used to find the vessel w/ return of bright red pulsatile blood flow. After cannulation there was return of red pulsatile blood, at this time a wire was introduced through the finder needle/arrow catheter into the vessel and catheter was threaded over the wire using Seldinger technique and the needle removed.The catheter displayed good blood return, connecting catheter to the monitor appropriate arterial waveforms were seen. The line was then secured to the skin with a suture and sterile dressing placed. Circulation distal to the point of insertion was checked and found to be adequate.    The patient tolerated the procedure well    Complications none    Electronically signed by MICHELLE Yanes, 05/30/24, 11:47 AM EDT.

## 2024-05-30 NOTE — PROCEDURES
Procedure Note: Dialysis Catheter Line Placement Procedure Note    Indication: Emergent HD, hyperkalemia    Consent obtained: Patient and daughter    Time Out: performed at bedside    Procedure: The patient was positioned appropriately and the skin over the right internal jugular vein was prepped with ChloraPrep and draped in sterile fashion.  Local anesthesia over the insertion site was applied with 1% lidocaine.  A large bore needle was then advanced with ultrasound guidance until there was return of dark blood. Guidewire was inserted and site confirmed with ultrasound again. Site was dilated with dilator, and triple lumen Trialysis catheter was then inserted into the vessel over the guide wire using the Seldinger technique.  All three ports showed good, free flowing blood return and easily flushed with saline.  The catheter was then securely fastened to the skin with sutures and covered with a sterile dressing.      A post procedure X-ray is pending at this time.    The patient tolerated the procedure well.    Complications: None    Electronically signed by Layla Garcia MD, 5/29/2024, 20:48 EDT.

## 2024-05-30 NOTE — CONSULTS
05/30/24 1215   Coping/Psychosocial   Additional Documentation Spiritual Care (Group)   Spiritual Care   Spiritual Care Source nurse referral   Spiritual Care Follow-Up will follow closely   Response to Spiritual Care receptive of support   Spiritual Care Interventions decision-making facilitated   Spiritual Care Visit Type other (see comments)  (pt continues to decline)   Spiritual Care Request decision-making support;family support  (call made to pt's grand-daughter listed. Grand-daughter states that the pt would want to be DNR. Nurse has been informed. code status will be changed. Grand-daughter is on her way to the hospital.)   Pt is currently in ICU on vent support.

## 2024-05-30 NOTE — PLAN OF CARE
Goal Outcome Evaluation:      Patient intubated this morning. Current settings:      Ac 24,430,80% +5    Attempted to collect sputum specimen, however, patient has no secretions at this time.

## 2024-05-30 NOTE — PROGRESS NOTES
[facility]     Progress Note    Patient Name: Rebecca Mchugh  : 1950  MRN: [id number]  Primary Care Physician:  Colin Jamil MD  Date of admission: 2024    Subjective   Patient clinically decompensated early in the morning  Noted to have been bradycardic and was paced  Subsequently upon intubation noted to have some pooling of blood and NG was placed with about 450 cc output of dark blood  Hemoglobin continued to trend down and was given emergently 2 units of blood  Pressor requirement continued to increase and patient appears to be more mottled  Overall prognosis appears to be very poor  Was emergently dialyzed overnight due to hyperkalemia with prescription tailored to ensure no quick correction of sodium  Prior to dialysis was resuscitated with 3 L of IV fluids  Lactic acid worsened this morning      Scheduled Meds:[Held by provider] apixaban, 5 mg, Oral, Q12H  [Held by provider] carvedilol, 6.25 mg, Oral, BID With Meals  cefepime, 2,000 mg, Intravenous, Once  cefepime, 2,000 mg, Intravenous, Q24H  hydrocortisone sodium succinate, 100 mg, Intravenous, Q8H  levothyroxine, 25 mcg, Oral, Q AM  ondansetron, 4 mg, Intravenous, Once  senna-docusate sodium, 2 tablet, Oral, BID  sodium chloride, 10 mL, Intravenous, Q12H  sodium chloride, 10 mL, Intravenous, Q12H  vancomycin, 20 mg/kg, Intravenous, Once      Continuous Infusions:EPINEPHrine, 0.02-0.3 mcg/kg/min, Last Rate: 0.1 mcg/kg/min (24)  insulin, 0-100 Units/hr  norepinephrine, 0.02-3 mcg/kg/min, Last Rate: 0.8 mcg/kg/min (24 0700)  pantoprazole, 8 mg/hr, Last Rate: 8 mg/hr (24)  Phoxillum BK4/2.5, 2,500 mL/hr  PrismaSol BGK 2/3.5, 1,000 mL/hr  sodium bicarbonate 150 mEq in D5W, 200 mL/hr  vasopressin, 0.03 Units/min, Last Rate: 0.03 Units/min (24)      PRN Meds:.  acetaminophen    ALPRAZolam    aluminum-magnesium hydroxide-simethicone    senna-docusate sodium **AND** polyethylene glycol **AND** bisacodyl  **AND** bisacodyl    Calcium Replacement - Follow Nurse / BPA Driven Protocol    dextrose    dextrose    dextrose    glucagon (human recombinant)    heparin (porcine)    heparin (porcine)    HYDROcodone-acetaminophen    HYDROmorphone **AND** naloxone    Magnesium Standard Dose Replacement - Follow Nurse / BPA Driven Protocol    nitroglycerin    ondansetron    Pharmacy to Dose Cefepime    Pharmacy to dose vancomycin    Phosphorus Replacement - Follow Nurse / BPA Driven Protocol    Potassium Replacement - Follow Nurse / BPA Driven Protocol    sodium chloride    sodium chloride    sodium chloride    sodium chloride    temazepam       Review of Systems  Negative except for the above    Objective   Objective     Vitals:   Temp:  [94.1 °F (34.5 °C)-99.9 °F (37.7 °C)] 99.1 °F (37.3 °C)  Heart Rate:  [58-84] 71  Resp:  [17-26] 24  BP: ()/() 162/51  Flow (L/min):  [0-2] 0  FiO2 (%):  [80 %-100 %] 80 %  Physical Exam    Constitutional: Sedated and intubated              psychiatric:  Deferred   Neurologic:  Sedated and intubated, strength symmetric in all extremities, Cranial Nerves grossly intact to confrontation, speech clear   Eyes:   PERRLA, sclerae anicteric, no conjunctival injection   HEENT:  Moist mucous membranes, no nasal or eye discharge, no throat congestion   Neck:   Supple, no thyromegaly, no lymphadenopathy, trachea midline, no elevated JVD   Respiratory:  Clear to auscultation bilaterally, nonlabored respirations    Cardiovascular: RRR, no murmurs, rubs, or gallops, palpable pedal pulses bilaterally, No bilateral ankle edema   Gastrointestinal: Positive bowel sounds, soft, nontender, nondistended, no organomegaly   Musculoskeletal:  No clubbing or cyanosis to extremities,muscle wasting, joint swelling, muscle weakness             Skin:                      No rashes, bruising, skin ulcers, petechiae or ecchymosis    Result Review    Result Review:  I have personally reviewed the results from the  time of this admission to 5/30/2024 08:08 EDT and agree with these findings:  []  Laboratory  []  Microbiology  []  Radiology  []  EKG/Telemetry   []  Cardiology/Vascular   []  Pathology  []  Old records  []  Other:    Assessment & Plan   Assessment / Plan       Active Hospital Problems:  Active Hospital Problems    Diagnosis     **ARF (acute renal failure)     Acute renal failure     Hyperkalemia     Seizure disorder     Anemia     DM (diabetes mellitus), type 2      73-year-old female with past medical history of hypertension, seizure disorder, GERD, hyperlipidemia, anxiety/depression, atrial fibrillation on anticoagulation, hypothyroidism who came into the hospital due to abdominal pain and constipation noted to be in septic shock.  Noted to have DEVEN with creatinine rise to 8 with low bicarb and potassium of 7 emergently dialyzed on 5/29 and sodium of 119 with prescription tailored and suspect etiology likely renal failure in background of face, possible use of NSAIDs, severe constipation in the background of full dose of anticoagulation causing GI bleed, shock, possible ischemia, bradycardia and critically worsening condition intubated on the morning of 5/30 with mottling noted.  Imaging with very distended bowel loops and air-fluid levels in the stomach    plan:   Will transition to CRRT with preblood pump using 2K bath of 1000, dialysate of 2500 on 4K bath and post filter using bicarb of 200  Patient on broad-spectrum antibiotics  Noted patient developing DIC  Pressors to maintain MAP greater than 65.  Patient currently on 3 pressors  Trujillo in place with patient having minimal urine output  stat echo with preserved ejection fraction  Overall prognosis is very poor and recommend palliative care and possible transition to hospice    Tronically signed by Rosa Elena Crawford MD, 05/30/24, 8:08 AM EDT.

## 2024-05-30 NOTE — ED PROVIDER NOTES
Intubation    Date/Time: 5/30/2024 6:01 AM    Performed by: Kamlesh Cheney MD  Authorized by: Jaya Yan MD    Consent:     Consent obtained:  Emergent situation  Universal protocol:     Patient identity confirmed:  Arm band  Pre-procedure details:     Indications: airway protection, altered consciousness and respiratory failure      Patient status:  Altered mental status    Look externally: no concerns      Mouth opening - incisor distance:  3 or more finger widths    Hyoid-mental distance: 3 or more finger widths      Hyoid-thyroid distance: 2 or more finger widths      Mallampati score:  II    Obstruction: none      Neck mobility: normal      Pharmacologic strategy: RSI      Induction agents:  Etomidate    Paralytics:  Rocuronium  Procedure details:     Preoxygenation:  Bag valve mask    CPR in progress: no      Number of attempts:  1  Successful intubation attempt details:     Intubation method:  Oral    Intubation technique: video assisted      Laryngoscope blade:  Mac 3    Grade view: II      Tube size (mm):  7.5    Tube type:  Cuffed    Tube visualized through cords: yes    Placement assessment:     ETT at teeth/gumline (cm):  23    Tube secured with:  ETT ellis    Breath sounds:  Equal    Placement verification: chest rise, colorimetric ETCO2, CXR verification, direct visualization, equal breath sounds and tube exhalation      CXR findings:  Appropriate position  Post-procedure details:     Procedure completion:  Tolerated well, no immediate complications      .     Kamlesh Cheney MD  05/30/24 0604

## 2024-05-30 NOTE — PROGRESS NOTES
RT EQUIPMENT DEVICE RELATED - SKIN ASSESSMENT    RT Medical Equipment/Device:     ETT Cat/Anchorfast    Skin Assessment:      Cheek:  Intact  Nose:  Intact    Device Skin Pressure Protection:  Pressure points protected    Nurse Notification:  Amina Choudhary, RRT

## 2024-05-30 NOTE — CONSULTS
[facility]  Initial Inpatient Consult Note    Referring Provider: Radha    Reason for Consultation: Hematemesis, melena    Subjective     History of present illness:    73 y.o. female with a past medical history of acid reflux, diabetes mellitus, GERD, hyperlipidemia, and hypertension presented to the ED yesterday for abdominal pain, nausea, and vomiting.  Patient is on mechanical ventilation and no family at bedside.  History was obtained through chart review and nursing staff.    Per chart review patient advised ED that she had not had bowel movement for 2 to 3 weeks.  In the ED patient's potassium was 6.8 and dialysis was ordered, but patient was only able to tolerate 1-1/2-hour dialysis treatment with no fluid removal.  This morning patient developed hypotension as well as bradycardia and was intubated for airway protection.  She is currently on levo, vaso, epi.  Nursing staff advised when they placed OG they had about 350ml  out that was dark red in color.  She has also been having melanotic stool.    05/29/2024 CT Abdomen/Pelvis  CT scan of the abdomen and pelvis without contrast demonstrating small  pericardial effusion, not evident on 9/26/2019.     Post cholecystectomy and hysterectomy.     Semisolid material is seen in the right hemicolon and transverse colon,  loops measure up to 5.5 cm in diameter. The wall of the splenic flexure  of the colon is abnormally thickened, measuring up to 1.5 cm.  Ill-defined increased density is seen in surrounding fat. The  differential diagnosis includes infection, inflammatory bowel disease,  and ischemia. A small amount of peritoneal fluid is seen.    Past Medical History:  Past Medical History:   Diagnosis Date    Acid reflux     Ankle fracture 2020    Bladder disorder     Breast cancer     Right    Cancer     Colitis     Diabetes     Diabetes mellitus     DVT (deep venous thrombosis)     GERD (gastroesophageal reflux disease)     History of  "spinal fracture 11/2020    Hyperlipemia     Recurrent UTI (urinary tract infection)     Renal calculus or stone     Seasonal allergies     Trigger thumb of right hand 06/17/2021    Urinary incontinence in female      Past Surgical History:  Past Surgical History:   Procedure Laterality Date    ABDOMINAL HYSTERECTOMY      BACK SURGERY      HERNIATED DISCS  X3 OR 4 TIMES     BLADDER REPAIR  2002    BRAIN SURGERY      \"I had blood on my brain\"    BREAST BIOPSY      BREAST LUMPECTOMY Right     breast mass    MING HOLE N/A 06/16/2023    Procedure: LEFT SIDED MING HOLE;  Surgeon: Prieto Castillo MD;  Location: [facility] OR;  Service: Neurosurgery;  Laterality: N/A;    CHOLECYSTECTOMY      COLONOSCOPY      COLONOSCOPY N/A 07/24/2023    Procedure: COLONOSCOPY;  Surgeon: Lee Ann Rios MD;  Location: [facility] ENDOSCOPY;  Service: Gastroenterology;  Laterality: N/A;  DIVERTICULOSIS, HEMORRHOIDS    CYSTOSCOPY      with dilation    ENDOSCOPY N/A 07/24/2023    Procedure: ESOPHAGOGASTRODUODENOSCOPY;  Surgeon: Lee Ann Rios MD;  Location: [facility] ENDOSCOPY;  Service: Gastroenterology;  Laterality: N/A;  HIATAL HERNIA    GALLBLADDER SURGERY      INTERSTIM PLACEMENT  2018-19?    INTRAOCULAR LENS INSERTION      TRIGGER FINGER RELEASE Right 06/17/2021    Procedure: RIGHT FINGER TRIGGER THUMB RELEASE;  Surgeon: Eyad Griffith MD;  [place] [facility] [place] [facility] [place]  Service: Orthopedics;  Laterality: Right;    VAGINAL MESH REVISION      vaginal approach per Dr. Liao      Social History:   Social History     Tobacco Use    Smoking status: Former     Current packs/day: 1.00     Types: Cigarettes     Passive exposure: Past    Smokeless tobacco: Not on file    Tobacco comments:     quit smoking at age 48   Substance Use Topics    Alcohol use: Never      Family History:  Family History   Problem Relation Age of Onset    Cancer Mother         unspecified    Breast cancer Mother         30s    Heart disease Brother     Cancer " Brother         unspecified    Diabetes Brother         unspecified type    Malig Hyperthermia Neg Hx        Home Meds:  Medications Prior to Admission   Medication Sig Dispense Refill Last Dose    amitriptyline (ELAVIL) 25 MG tablet Take 1 tablet by mouth Every Night.       apixaban (ELIQUIS) 5 MG tablet tablet Take 1 tablet by mouth Every 12 (Twelve) Hours. Indications: DVT/PE (active thrombosis) 60 tablet 0     atorvastatin (LIPITOR) 40 MG tablet Take 1 tablet by mouth Daily.       calcium carbonate (OS-CHAPARRO) 600 MG tablet Take 1 tablet by mouth 2 (Two) Times a Day With Meals.       carvedilol (COREG) 6.25 MG tablet Take 1 tablet by mouth 2 (Two) Times a Day With Meals. 60 tablet 0     cyclobenzaprine (FLEXERIL) 10 MG tablet Take 1 tablet by mouth 3 (Three) Times a Day As Needed for Muscle Spasms.       ferrous sulfate (FerrouSul) 325 (65 FE) MG tablet Take 1 tablet by mouth Daily With Breakfast. 30 tablet 0     fesoterodine fumarate (TOVIAZ ER) 8 MG tablet sustained-release 24 hour tablet Take 1 tablet by mouth Daily.       HYDROcodone-acetaminophen (NORCO) 7.5-325 MG per tablet 1 tab(s) PO Q6HR PRN       levETIRAcetam (KEPPRA) 1000 MG tablet Take 1 tablet by mouth Every 12 (Twelve) Hours.       levothyroxine (SYNTHROID, LEVOTHROID) 25 MCG tablet Take 1 tablet by mouth Every Morning.       lisinopril (PRINIVIL,ZESTRIL) 10 MG tablet Take 1 tablet by mouth Daily. 30 tablet 0     olopatadine (PATANOL) 0.1 % ophthalmic solution 1 drop 2 (Two) Times a Day.       pantoprazole (PROTONIX) 40 MG EC tablet Take 1 tablet by mouth Every Morning. 30 tablet 0     traZODone (DESYREL) 100 MG tablet Take 1 tablet by mouth Every Night.        Current Meds:   [Held by provider] apixaban, 5 mg, Oral, Q12H  calcium gluconate, 1,000 mg, Intravenous, Once  [Held by provider] carvedilol, 6.25 mg, Oral, BID With Meals  cefepime, 2,000 mg, Intravenous, Q24H  hydrocortisone sodium succinate, 100 mg, Intravenous, Q8H  [START ON 5/31/2024]  levothyroxine, 25 mcg, Per G Tube, Q AM  ondansetron, 4 mg, Intravenous, Once  senna-docusate sodium, 2 tablet, Per G Tube, BID  sodium chloride, 10 mL, Intravenous, Q12H  sodium chloride, 10 mL, Intravenous, Q12H      Allergies:  Allergies   Allergen Reactions    [place] Hives     Review of Systems  Pertinent items are noted in HPI     Objective     Vital Signs  Temp:  [94.1 °F (34.5 °C)-99.9 °F (37.7 °C)] 98.1 °F (36.7 °C)  Heart Rate:  [58-84] 70  Resp:  [17-26] 24  BP: ()/() 123/50  FiO2 (%):  [80 %-100 %] 80 %  Physical Exam:  General Appearance:    ventilated, in no acute distress   Head:    Normocephalic, without obvious abnormality, atraumatic   Eyes:          conjunctivae and sclerae normal, no icterus   Throat:   no thrush, oral mucosa moist   Neck:   Supple, no adenopathy   Lungs:     Mechanically ventilated    Heart:    Regular rhythm and normal rate    Chest Wall:    No abnormalities observed   Abdomen:     Soft, non distended, non tender   Extremities:   no edema, no redness   Skin:   No bruising or rash   Psychiatric:  normal mood and insight     Results Review:   I reviewed the patient's new clinical results.    Results from last 7 days   Lab Units 05/30/24  0711 05/30/24  0504 05/30/24  0243 05/29/24  1648   WBC 10*3/mm3  --   --  3.93 4.54   HEMOGLOBIN g/dL 11.7* 5.8* 8.2* 8.3*   HEMATOCRIT % 35.8 17.4* 25.8* 24.9*   PLATELETS 10*3/mm3  --   --  201 260     Results from last 7 days   Lab Units 05/30/24  1100 05/30/24  0945 05/30/24  0818 05/30/24  0546 05/30/24  0515 05/30/24  0511 05/30/24  0439 05/30/24  0243 05/29/24  1851 05/29/24  1648   SODIUM mmol/L 135*  --   --   --   --  126*  --  127*   < > 118*   SODIUM, ARTERIAL mmol/L  --  128.3* 129.3*   < >  --   --    < >  --   --   --    SODIUM, VENOUS mmol/L  --   --   --   --  125.9*  --   --   --   --   --    POTASSIUM mmol/L 4.1  --   --   --   --  5.1  --  4.8   < > 6.9*   POTASSIUM, VENOUS mmol/L  --   --   --   --  5.0  --    --   --   --   --    CHLORIDE mmol/L 107  --   --   --   --  93*  --  98   < > 89*   CHLORIDE, VENOUS mmol/L  --   --   --   --  98  --   --   --   --   --    CO2 mmol/L 12.4*  --   --   --   --  12.1*  --  10.7*   < > 8.0*   BUN mg/dL 58*  --   --   --   --  70*  --  74*   < > 94*   CREATININE mg/dL 4.32*  --   --   --   --  5.29*  --  5.65*   < > 8.56*   CALCIUM mg/dL 5.5*  --   --   --   --  6.5*  --  7.8*   < > 9.2   BILIRUBIN mg/dL  --   --   --   --   --  0.5  --  0.7  --  0.7   ALK PHOS U/L  --   --   --   --   --  30*  --  47  --  74   ALT (SGPT) U/L  --   --   --   --   --  8  --  9  --  11   AST (SGOT) U/L  --   --   --   --   --  18  --  21  --  14   GLUCOSE mg/dL 152*  --   --   --   --  529*  --  117*   < > 135*   GLUCOSE, ARTERIAL mg/dL  --  195* 189*   < > 492*  --    < >  --   --   --     < > = values in this interval not displayed.     Results from last 7 days   Lab Units 05/30/24  0504   INR  2.89*     Lab Results   Lab Value Date/Time    LIPASE 30 09/26/2019 2202       Radiology:  XR Chest 1 View    Result Date: 5/30/2024  Well-positioned endotracheal tube.  Electronically Signed By-Dr. Michael Medeiros MD On:5/30/2024 5:39 AM      XR Chest 1 View    Result Date: 5/29/2024  Impression: Right jugular central venous catheter tip is in the distal SVC.  No pneumothorax is seen.   Electronically Signed By-CLIFFORD KAMARA MD On:5/29/2024 8:11 PM      CT Abdomen Pelvis Without Contrast    Result Date: 5/29/2024  Impression: CT scan of the abdomen and pelvis without contrast demonstrating small pericardial effusion, not evident on 9/26/2019.  Post cholecystectomy and hysterectomy.  Semisolid material is seen in the right hemicolon and transverse colon, loops measure up to 5.5 cm in diameter. The wall of the splenic flexure of the colon is abnormally thickened, measuring up to 1.5 cm. Ill-defined increased density is seen in surrounding fat. The differential diagnosis includes infection, inflammatory bowel  disease, and ischemia. A small amount of peritoneal fluid is seen.      Electronically Signed By-CLIFFORD KAMARA MD On:5/29/2024 8:10 PM      XR Abdomen KUB    Result Date: 5/29/2024  Impression: No evidence of bowel obstruction. Mild distal colonic stool burden.   Electronically Signed By-Corona Zheng MD On:5/29/2024 4:27 PM        Assessment & Plan     ARF (acute renal failure)    DM (diabetes mellitus), type 2    Acute renal failure    Hyperkalemia    Seizure disorder    Anemia       Assessment:  Anemia  Melena  Hematemesis      Plan:  Due to patient's current condition she is not a candidate for scopes at this time  We will continue to monitor H&H and transfuse as needed  Continue Protonix drip  Palliative care team is on board      I discussed the patients findings and my recommendations with nursing staff.    Electronically signed by TITO Arevalo, 05/30/24, 12:09 PM EDT.

## 2024-05-30 NOTE — CONSULTS
"Nutrition Services    Patient Name: Rebecca Mchugh  YOB: 1950  MRN: [id number]  Admission date: 5/29/2024      CLINICAL NUTRITION ASSESSMENT      Reason for Assessment  Physician Consult and MST Score 2+   H&P:  Past Medical History:   Diagnosis Date    Acid reflux     Ankle fracture 2020    Bladder disorder     Breast cancer     Right    Cancer     Colitis     Diabetes     Diabetes mellitus     DVT (deep venous thrombosis)     GERD (gastroesophageal reflux disease)     History of spinal fracture 11/2020    Hyperlipemia     Recurrent UTI (urinary tract infection)     Renal calculus or stone     Seasonal allergies     Trigger thumb of right hand 06/17/2021    Urinary incontinence in female         Current Problems:   Active Hospital Problems    Diagnosis     **ARF (acute renal failure)     Acute renal failure     Hyperkalemia     Seizure disorder     Anemia     DM (diabetes mellitus), type 2         Nutrition/Diet History         Narrative   73 year old female presented to ED for evaluation of abdominal pain and constipation x 3 weeks. Patient lethargic x several days. Increased swelling of the legs. Admitted to ICU for emergent dialysis. Pt currently intubated and sedated with OGT in place. Receiving 3 pressors at this time; epinephrine, levophed, vasopressin. Do not recommend feeding patient while on 3 or more pressors. RD will provide EN recommendations should they be needed once weaned off multiple pressors.      Anthropometrics        Current Height, Weight Height: 162.6 cm (64\")  Weight: 57.3 kg (126 lb 5.2 oz)   Current BMI Body mass index is 21.68 kg/m².   BMI Classification Normal range   % %   Adjusted Body Weight (ABW) N/A   Weight Hx  Wt Readings from Last 30 Encounters:   05/29/24 2028 57.3 kg (126 lb 5.2 oz)   05/29/24 1554 60.7 kg (133 lb 13.1 oz)   07/26/23 0310 66.4 kg (146 lb 6.2 oz)   07/25/23 0423 65.2 kg (143 lb 11.8 oz)   07/24/23 0412 65.3 kg (143 lb 15.4 oz)   07/23/23 " 0500 65.2 kg (143 lb 11.8 oz)   07/22/23 1838 64.3 kg (141 lb 12.1 oz)   07/22/23 1153 70.5 kg (155 lb 6.8 oz)   06/19/23 0400 71.1 kg (156 lb 12 oz)   06/18/23 0535 69 kg (152 lb 1.9 oz)   06/18/23 0000 69.9 kg (154 lb 1.6 oz)   06/17/23 0527 69.3 kg (152 lb 12.5 oz)   06/16/23 0532 67.2 kg (148 lb 2.4 oz)   06/16/23 0531 67.2 kg (148 lb 2.4 oz)   06/16/23 0527 67.2 kg (148 lb 2.4 oz)   06/15/23 2210 64 kg (141 lb 1.5 oz)   02/07/23 1938 64.8 kg (142 lb 13.7 oz)   05/12/22 1054 66.7 kg (147 lb 0.8 oz)   06/30/21 1119 68.5 kg (151 lb)   06/17/21 1133 67 kg (147 lb 11.3 oz)   06/02/21 0000 71.7 kg (158 lb)   03/05/20 0000 71.7 kg (158 lb)   02/13/20 0000 74.8 kg (165 lb)   07/03/19 0000 72.6 kg (160 lb)   06/19/19 0000 74 kg (163 lb 4 oz)   06/10/19 0000 72.2 kg (159 lb 2 oz)   05/06/19 0000 73.5 kg (162 lb)   02/18/19 0000 73.9 kg (163 lb)   01/21/19 0000 73.9 kg (163 lb)   12/10/18 0000 73.5 kg (162 lb)   10/31/18 0000 73.5 kg (162 lb)   08/29/18 0000 72.6 kg (160 lb 2 oz)   08/16/18 0000 73.7 kg (162 lb 8 oz)   05/14/18 0000 73.2 kg (161 lb 6 oz)   04/11/18 0000 74.4 kg (164 lb)   03/29/18 0000 7.258 kg (16 lb)          Wt Change Observation -10.5% x 1 year, not clinically significant      Estimated/Assessed Needs  Estimated Needs based on: Critical Care Guidelines       Energy Requirements 12-25 kcal/kg   EST Needs (kcal/day) 684-1425 kcal       Protein Requirements 1.2-2.0 g/kg   EST Daily Needs (g/day)  g pro       Fluid Requirements 25 ml/kg    Estimated Needs (mL/day) 1425 ml     Labs/Medications Epi, Vaso, Levo        Pertinent Labs Reviewed.   Results from last 7 days   Lab Units 05/30/24  1100 05/30/24  0945 05/30/24  0818 05/30/24  0546 05/30/24  0515 05/30/24  0511 05/30/24  0439 05/30/24  0243 05/29/24  1851 05/29/24  1648   SODIUM mmol/L 135*  --   --   --   --  126*  --  127*   < > 118*   SODIUM, ARTERIAL mmol/L  --  128.3* 129.3*   < >  --   --    < >  --   --   --    SODIUM, VENOUS mmol/L  --    --   --   --  125.9*  --   --   --   --   --    POTASSIUM mmol/L 4.1  --   --   --   --  5.1  --  4.8   < > 6.9*   POTASSIUM, VENOUS mmol/L  --   --   --   --  5.0  --   --   --   --   --    CHLORIDE mmol/L 107  --   --   --   --  93*  --  98   < > 89*   CHLORIDE, VENOUS mmol/L  --   --   --   --  98  --   --   --   --   --    CO2 mmol/L 12.4*  --   --   --   --  12.1*  --  10.7*   < > 8.0*   BUN mg/dL 58*  --   --   --   --  70*  --  74*   < > 94*   CREATININE mg/dL 4.32*  --   --   --   --  5.29*  --  5.65*   < > 8.56*   CALCIUM mg/dL 5.5*  --   --   --   --  6.5*  --  7.8*   < > 9.2   BILIRUBIN mg/dL  --   --   --   --   --  0.5  --  0.7  --  0.7   ALK PHOS U/L  --   --   --   --   --  30*  --  47  --  74   ALT (SGPT) U/L  --   --   --   --   --  8  --  9  --  11   AST (SGOT) U/L  --   --   --   --   --  18  --  21  --  14   GLUCOSE mg/dL 152*  --   --   --   --  529*  --  117*   < > 135*   GLUCOSE, ARTERIAL mg/dL  --  195* 189*   < > 492*  --    < >  --   --   --     < > = values in this interval not displayed.     Results from last 7 days   Lab Units 05/30/24  1100 05/30/24  0711 05/30/24  0504 05/30/24  0243   MAGNESIUM mg/dL 1.6  --   --  1.7   PHOSPHORUS mg/dL 3.8  --   --  3.8   HEMOGLOBIN g/dL  --  11.7*   < > 8.2*   HEMATOCRIT %  --  35.8   < > 25.8*    < > = values in this interval not displayed.     COVID19   Date Value Ref Range Status   05/30/2024 Not Detected Not Detected - Ref. Range Final     Lab Results   Component Value Date    HGBA1C 7.50 (H) 02/08/2023         Pertinent Medications Reviewed.     Malnutrition Severity Assessment              Nutrition Diagnosis         Nutrition Dx Problem 1 Inadequate oral Intake related to decreased ability to consume sufficient energy as evidenced by NPO.     Nutrition Intervention           Current Nutrition Orders & Evaluation of Intake       Current PO Diet NPO Diet NPO Type: Strict NPO   Supplement Orders Placed This Encounter      Patient is on  Glucommander           Nutrition Intervention/Prescription        EN Recommendations:    Peptamen Intense VHP @ 45 ml/hr x 22 hr   FWF 75 ml q3h (600 ml)  Provides: 990 kcal, 91 g pro, 832 ml (1432 ml total)        Medical Nutrition Therapy/Nutrition Education          Learner     Readiness Patient  Education not appropriate at this time     Method     Response N/A  N/A     Monitor/Evaluation        Monitor Per protocol, Pertinent labs, POC/GOC     Nutrition Discharge Plan         To be determined     Electronically signed by:  Adelina Falcon RD  05/30/24 13:07 EDT

## 2024-05-30 NOTE — NURSING NOTE
Duration of Treatment 1.5 Hours                           Completed entire tx              Access Site TDC                                      Dialyzer Revaclear    mL/min   Dialysate Temperature (C) 36   BFR-As tolerated to a maximum of: 200 mL/min   Dialysate Solution Bath: K+ 2 mEq, Ca 2.5mEq   Bicarb 30 mEq   Na+ 130 mEq   Fluid Removal: 0       Pt completed STAT 1.5 hour dialysis treatment with no fluid removal; bolus with 600mL throughout tx due to hypotension and to prevent clotting in dialyzer; pt on levo throughout tx; systolic pressures during tx in the 80s and 90s, post /50 HR 71, Right IJ positional but able to complete tx, no other issues.     Report given to Jenn, primary RN who was at bedside.

## 2024-05-30 NOTE — PROGRESS NOTES
Pulmonary / Critical Care Progress Note      Patient Name: Rebecca Mchugh  : 1950  MRN: [id number]  Attending:  Jaya Yan MD   Date of admission: 2024    Subjective   Subjective   Patient critically ill with acute renal failure, shock, respiratory failure on mechanical ventilator    Over past 24 hours:  Around 4 AM patient suddenly developed hypotension as well as bradycardia.  He was given atropine.  She was then placed on multiple pressors, currently maxed on levo, vaso, epi currently at 0.08  She was also intubated for airway protection  Nursing team noted melanotic stool  Hemoglobin dropped from 8.2 to 5.8.  She was given a unit of blood  Patient tolerated about an hour of HD    Review of Systems  Unable to obtain    Objective   Objective     Vitals:   Vital signs for last 24 hours:  Temp:  [94.1 °F (34.5 °C)-99.9 °F (37.7 °C)] 99.1 °F (37.3 °C)  Heart Rate:  [58-84] 71  Resp:  [17-26] 24  BP: ()/() 162/51  FiO2 (%):  [80 %-100 %] 80 %    Intake/Output last 3 shifts:  I/O last 3 completed shifts:  In: 381.2 [I.V.:381.2]  Out: 450 [Emesis/NG output:450]  Intake/Output this shift:  No intake/output data recorded.    Vent settings for last 24 hours:  Mode: VC/AC  FiO2 (%):  [80 %-100 %] 80 %  S RR:  [18-24] 24  S VT:  [400 mL-430 mL] 430 mL  PEEP/CPAP (cm H2O):  [5 cm H20] 5 cm H20  MAP (cm H2O):  [8.7-11] 11    Hemodynamic parameters for last 24 hours:       Physical Exam   Vital Signs Reviewed   General: Chronically ill-appearing, pale woman, NAD. Lying in bed   HEENT:  PERRL, EOMI.  OP  Chest:  Clear to auscultation bilaterally, no work of breathing noted on mechanical ventilator  CV: RRR, no MGR, pulses 2+, equal.  Abd:  Soft, NT, ND, + BS  EXT:  no clubbing, no cyanosis, no edema  Neuro:  CN grossly intact, no focal deficits.  Skin: No rashes or lesions noted    Result Review    Result Review:  I have personally reviewed the results from the time of this admission to 2024 08:10  EDT and agree with these findings:  []  Laboratory  []  Microbiology  []  Radiology  []  EKG/Telemetry   []  Cardiology/Vascular   []  Pathology  []  Old records  []  Other:  Most notable findings include:   -    Assessment & Plan   Assessment / Plan     Active Hospital Problems:  Active Hospital Problems    Diagnosis     **ARF (acute renal failure)     Acute renal failure     Hyperkalemia     Seizure disorder     Anemia     DM (diabetes mellitus), type 2          Impression:  Shock requiring multiple vasopressors  Life threatening hyperkalemia present on admission  Acute renal failure requiring CRRT/HD  Respiratory failure on mechanical ventilator  GI bleed  Acute blood loss anemia requiring blood transfusion  Bradycardia requiring external pacing  Hyponatremia  Anion Gap Metabolic acidosis  Hyperglycemia  Chronic constipation  Elevated LDH  DIC  History of hypertension     Plan:  -Continue mechanical ventilator with current settings.  Repeat ABG and adjust vent settings accordingly  -No sedation right now but can add propofol/fentanyl if patient wakes up and becomes agitated/dyssynchrony with vent  -Repeat ABG  -Currently on levo, vaso, epi; keep MAP greater than 65  -Start hydrocortisone 100 mg every 8  -RIJ HD catheter placed for emergent HD per renal; patient will now likely only tolerate CRRT given shock  -Medically treat hyperkalemia with insulin, calcium, mag; No significant EKG changes noted  -Avoid nephrotoxic agents  -Hold ALL home BP medications at this time  -Abd CT showed semisolid material in the right hemicolon measuring up to 5.5 cm in diameter, colonic wall thickening.  There is also distended stomach which contains fluid.  Given melanotic stool this is concern for bleeding  -Consult GI  -Consult cardiology given persistent bradycardia requiring external pacing  -Consult surgery  -Renal on board appreciate recs  -Continue sodium bicarb at 150 cc/h  -Start broad-spectrum antibiotic with vancomycin  and cefepime.  Panculture and de-escalate based on cultures  -Cont aspiration precautions. Keep HOB 30 deg.   -Replace electrolytes PRN to keep K 4.0, Mag 2.0, Phos 4.0.  -Start insulin drip given persistent hyperglycemia   -keep glucose 140-180 while in ICU. Cont SSI.  -Transfuse to keep Hgb >7  -DVT ppx: Hold home Eliquis  -GI ppx: PPI drip  -Lines: PIVs, RIJ HD Catheter     DVT prophylaxis:  Medical DVT prophylaxis orders are present.        CODE STATUS:   Code Status (Patient has no pulse and is not breathing): CPR (Attempt to Resuscitate)  Medical Interventions (Patient has pulse or is breathing): Full Support    Patient is critically ill in the ICU with shock requiring multiple vasopressors, acute kidney failure requiring HD/CRRT, respiratory failure requiring hand-held ventilator, DIC.  120 minutes of critical care time was spent managing this patient, excluding procedures. I, Dr. Layla Garcia, spent 120 minutes of critical care time. This included personally reviewing all pertinent labs, imaging, microbiology and documentation. Also discussing the case with the patient and any available family, the admitting physician and any available ancillary staff. Electronically signed by Layla Garcia MD, 05/30/24, 8:10 AM EDT.

## 2024-05-31 PROBLEM — K92.2 ACUTE GI BLEEDING: Status: ACTIVE | Noted: 2024-05-31

## 2024-05-31 PROBLEM — R00.1 BRADYCARDIA: Status: ACTIVE | Noted: 2024-05-31

## 2024-05-31 PROBLEM — J96.00 ACUTE RESPIRATORY FAILURE: Status: ACTIVE | Noted: 2024-05-31

## 2024-05-31 NOTE — DISCHARGE SUMMARY
[facility]         DISCHARGE SUMMARY    Patient Name: Rebecca Mchugh  : 1950  MRN: [id number]    Date of Admission: 2024  Date of Discharge: May 30, 2024  Primary Care Physician: Colin Jamil MD    Consults       Date and Time Order Name Status Description    2024  8:27 PM Inpatient Nephrology Consult Completed             Presenting Problem:   Hyperkalemia [E87.5]  Hyponatremia [E87.1]  ARF (acute renal failure) [N17.9]  Acute renal failure [N17.9]  Generalized abdominal pain [R10.84]  Acute renal failure, unspecified acute renal failure type [N17.9]    Active and Resolved Hospital Problems:  Active Hospital Problems    Diagnosis POA    **ARF (acute renal failure) [N17.9] Yes    Acute GI bleeding [K92.2] No    Acute respiratory failure [J96.00] No    Bradycardia [R00.1] No    Acute renal failure [N17.9] Yes    Hyperkalemia [E87.5] Yes    Seizure disorder [G40.909] Yes    Anemia [D64.9] Yes    DM (diabetes mellitus), type 2 [E11.9] Yes      Resolved Hospital Problems   No resolved problems to display.         Hospital Course     Hospital Course:  Rebecca Mchugh is a 73 y.o. female was admitted on 29 May with acute illness.  Patient was brought in with abdominal pain was found to be in acute renal failure.  Patient has severe constipation ongoing for 3 weeks.  She has been taking a lot of NSAIDs for pain control she is on anticoagulation with Eliquis for A-fib.    Patient admitted to ICU emergently dialyzed by nephrologist was on-call, nephrologist Dr. Juan Pablo Cuba was consulted.    Intensivist Dr. Garcia was notified patient was admitted to ICU aggressive supportive care was initiated but later in the night patient decompensated she became hypotensive and went into shock, also suffered from GI bleed.  She was intubated for respiratory failure and also had bradycardia.  Patient was aggressively supported but she continued to decline.  Despite aggressive measures patient did  not improve.  Patient's family was notified and per recommendations from consultants palliative care team discussed with patient's family and converted her CODE STATUS to comfort care.  Patient  after withdrawal of support.            Day of Discharge     Vital Signs:       Physical Exam:    Patient was examined earlier in the morning.  She was intubated, unresponsive.  Mottled feet and extremities noted.  Patient was on multiple vasopressors.  Neurologically unresponsive.      Pertinent  and/or Most Recent Results     LAB RESULTS:      Lab 24  1100 24  0945 24  0818 24  0711 24  0546 24  0508 24  0504 24  0439 24  0243 24  1818 24  1648   WBC  --   --   --   --   --   --   --   --  3.93  --  4.54   HEMOGLOBIN  --   --   --  11.7*  --   --  5.8*  --  8.2*  --  8.3*   HEMATOCRIT  --   --   --  35.8  --   --  17.4*  --  25.8*  --  24.9*   PLATELETS  --   --   --   --   --   --   --   --  201  --  260   NEUTROS ABS  --   --   --   --   --   --   --   --  2.83  2.87  --  3.74   IMMATURE GRANS (ABS)  --   --   --   --   --   --   --   --  0.01  --  0.01   LYMPHS ABS  --   --   --   --   --   --   --   --  0.71  --  0.56*   MONOS ABS  --   --   --   --   --   --   --   --  0.35  --  0.04*   EOS ABS  --   --   --   --   --   --   --   --  0.01  0.04  --  0.18   MCV  --   --   --   --   --   --   --   --  89.3  --  87.1   LACTATE 6.1*  --   --  7.3*  --    < >  --   --   --    < >  --    LACTATE, ARTERIAL  --  5.68* 5.98*  --  7.76*   < >  --    < >  --   --   --    LDH  --   --   --   --   --   --   --   --   --   --  232*   PROTIME  --   --   --   --   --   --  30.7*  --   --   --   --    APTT  --   --   --   --   --   --  56.6*  --   --   --   --     < > = values in this interval not displayed.         Lab 24  1100 24  0945 24  0818 24  0546 24  0515 24  0511 24  0439 24  0243 24  1853  05/29/24  1648   SODIUM 135*  --   --   --   --  126*  --  127* 120* 118*   SODIUM, ARTERIAL  --  128.3* 129.3* 127.1*  --   --    < >  --   --   --    SODIUM, VENOUS  --   --   --   --  125.9*  --   --   --   --   --    POTASSIUM 4.1  --   --   --   --  5.1  --  4.8 7.0* 6.9*   POTASSIUM, VENOUS  --   --   --   --  5.0  --   --   --   --   --    CHLORIDE 107  --   --   --   --  93*  --  98 91* 89*   CHLORIDE, VENOUS  --   --   --   --  98  --   --   --   --   --    CO2 12.4*  --   --   --   --  12.1*  --  10.7* 7.5* 8.0*   ANION GAP 15.6*  --   --   --   --  20.9*  --  18.3* 21.5* 21.0*   BUN 58*  --   --   --   --  70*  --  74* 98* 94*   CREATININE 4.32*  --   --   --   --  5.29*  --  5.65* 8.11* 8.56*   EGFR 10.2*  --   --   --   --  8.0*  --  7.4* 4.8* 4.5*   GLUCOSE 152*  --   --   --   --  529*  --  117* 107* 135*   GLUCOSE, ARTERIAL  --  195* 189* 251* 492*  --    < >  --   --   --    CALCIUM 5.5*  --   --   --   --  6.5*  --  7.8* 8.8 9.2   IONIZED CALCIUM 0.80* 0.99* 0.96* 1.04* 0.94*  --    < >  --   --   --    MAGNESIUM 1.6  --   --   --   --   --   --  1.7  --   --    PHOSPHORUS 3.8  --   --   --   --   --   --  3.8  --   --     < > = values in this interval not displayed.         Lab 05/30/24  1100 05/30/24  0511 05/30/24  0243 05/29/24  1648   TOTAL PROTEIN  --  3.3* 4.7* 6.2   ALBUMIN 1.7* 2.1* 2.8* 3.9   GLOBULIN  --  1.2 1.9 2.3   ALT (SGPT)  --  8 9 11   AST (SGOT)  --  18 21 14   BILIRUBIN  --  0.5 0.7 0.7   ALK PHOS  --  30* 47 74         Lab 05/30/24  0511 05/30/24  0504 05/30/24  0243   HSTROP T 49*  --  59*   PROTIME  --  30.7*  --    INR  --  2.89*  --              Lab 05/30/24  0504 05/30/24  0243   IRON  --  65   IRON SATURATION (TSAT)  --  38   TIBC  --  170*   TRANSFERRIN  --  114*   ABO TYPING B  --    RH TYPING Positive  --    ANTIBODY SCREEN Negative  --          Lab 05/30/24  0945 05/30/24  0818 05/30/24  0546   PH, ARTERIAL 7.247* 7.213* 7.105*   PCO2, ARTERIAL 26.8* 28.9* 38.2   PO2  ART 70.7* 94.9 203.9*   O2 SATURATION ART 92.7* 96.5 98.8   FIO2 60 60 100   HCO3 ART 11.4* 11.4* 11.7*   BASE EXCESS ART -14.4* -15.1* -16.5*   CARBOXYHEMOGLOBIN 0.2 3.2* 4.5*     Brief Urine Lab Results       None          Microbiology Results (last 10 days)       Procedure Component Value - Date/Time    Blood Culture - Blood, Arm, Right [[id number]]  (Normal) Collected: 05/30/24 0711    Lab Status: Preliminary result Specimen: Blood from Arm, Right Updated: 05/31/24 0731     Blood Culture No growth at 24 hours    Blood Culture - Blood, Hand, Right [[id number]]  (Normal) Collected: 05/30/24 0711    Lab Status: Preliminary result Specimen: Blood from Hand, Right Updated: 05/31/24 0731     Blood Culture No growth at 24 hours    S. Pneumo Ag Urine or CSF - Urine, Urine, Clean Catch [[id number]]  (Normal) Collected: 05/30/24 0640    Lab Status: Final result Specimen: Urine, Clean Catch Updated: 05/30/24 0719     Strep Pneumo Ag Negative    Legionella Antigen, Urine - Urine, Urine, Clean Catch [[id number]]  (Normal) Collected: 05/30/24 0640    Lab Status: Final result Specimen: Urine, Clean Catch Updated: 05/30/24 0718     LEGIONELLA ANTIGEN, URINE Negative    MRSA Screen, PCR (Inpatient) - Swab, Nares [[id number]]  (Normal) Collected: 05/30/24 0637    Lab Status: Final result Specimen: Swab from Nares Updated: 05/30/24 0851     MRSA PCR No MRSA Detected    Narrative:      The negative predictive value of this diagnostic test is high and should only be used to consider de-escalating anti-MRSA therapy. A positive result may indicate colonization with MRSA and must be correlated clinically.    Respiratory Panel PCR w/COVID-19(SARS-CoV-2) [facility]-House, NP Swab in UTM/VTM, 2 HR TAT - Swab, Nasopharynx [[id number]]  (Normal) Collected: 05/30/24 0634    Lab Status: Final result Specimen: Swab from Nasopharynx Updated: 05/30/24 0752     ADENOVIRUS, PCR Not Detected     Coronavirus 229E Not Detected      Coronavirus HKU1 Not Detected     Coronavirus NL63 Not Detected     Coronavirus OC43 Not Detected     COVID19 Not Detected     Human Metapneumovirus Not Detected     Human Rhinovirus/Enterovirus Not Detected     Influenza A PCR Not Detected     Influenza B PCR Not Detected     Parainfluenza Virus 1 Not Detected     Parainfluenza Virus 2 Not Detected     Parainfluenza Virus 3 Not Detected     Parainfluenza Virus 4 Not Detected     RSV, PCR Not Detected     Bordetella pertussis pcr Not Detected     Bordetella parapertussis PCR Not Detected     Chlamydophila pneumoniae PCR Not Detected     Mycoplasma pneumo by PCR Not Detected    Narrative:      In the setting of a positive respiratory panel with a viral infection PLUS a negative procalcitonin without other underlying concern for bacterial infection, consider observing off antibiotics or discontinuation of antibiotics and continue supportive care. If the respiratory panel is positive for atypical bacterial infection (Bordetella pertussis, Chlamydophila pneumoniae, or Mycoplasma pneumoniae), consider antibiotic de-escalation to target atypical bacterial infection.            PROCEDURES:    XR Chest 1 View    Result Date: 5/30/2024  Impression: Well-positioned endotracheal tube.  Electronically Signed By-Dr. Michael Medeiros MD On:5/30/2024 5:39 AM      XR Chest 1 View    Result Date: 5/29/2024  Impression: Impression: Right jugular central venous catheter tip is in the distal SVC.  No pneumothorax is seen.   Electronically Signed By-CLIFFORD KAMARA MD On:5/29/2024 8:11 PM      CT Abdomen Pelvis Without Contrast    Result Date: 5/29/2024  Impression: Impression: CT scan of the abdomen and pelvis without contrast demonstrating small pericardial effusion, not evident on 9/26/2019.  Post cholecystectomy and hysterectomy.  Semisolid material is seen in the right hemicolon and transverse colon, loops measure up to 5.5 cm in diameter. The wall of the splenic flexure of the colon  is abnormally thickened, measuring up to 1.5 cm. Ill-defined increased density is seen in surrounding fat. The differential diagnosis includes infection, inflammatory bowel disease, and ischemia. A small amount of peritoneal fluid is seen.      Electronically Signed By-CLIFFORD KAMARA MD On:5/29/2024 8:10 PM      XR Abdomen KUB    Result Date: 5/29/2024  Impression: Impression: No evidence of bowel obstruction. Mild distal colonic stool burden.   Electronically Signed By-Corona Zheng MD On:5/29/2024 4:27 PM       Results for orders placed during the hospital encounter of 07/22/23    Duplex Venous Lower Extremity - Bilateral CV-READ    Interpretation Summary    Acute right lower extremity deep vein thrombosis noted in the common femoral, proximal femoral and posterior tibial.    Acute left lower extremity deep vein thrombosis noted in the external iliac, proximal femoral, mid femoral, distal femoral, popliteal, posterial tibial, peroneal and gastrocnemius.    Acute-on-chronic left lower extremity deep vein thrombosis noted in the common femoral.    Acute left lower extremity superficial thrombophlebitis noted in the great saphenous (above knee).    All other veins appeared normal bilaterally.      Results for orders placed during the hospital encounter of 07/22/23    Duplex Venous Lower Extremity - Bilateral CV-READ    Interpretation Summary    Acute right lower extremity deep vein thrombosis noted in the common femoral, proximal femoral and posterior tibial.    Acute left lower extremity deep vein thrombosis noted in the external iliac, proximal femoral, mid femoral, distal femoral, popliteal, posterial tibial, peroneal and gastrocnemius.    Acute-on-chronic left lower extremity deep vein thrombosis noted in the common femoral.    Acute left lower extremity superficial thrombophlebitis noted in the great saphenous (above knee).    All other veins appeared normal bilaterally.      Results for orders placed during the  hospital encounter of 23    Adult Transthoracic Echo Complete W/ Cont if Necessary Per Protocol    Interpretation Summary    Left ventricular systolic function is normal. Left ventricular ejection fraction appears to be 61 - 65%.    Left ventricular wall thickness is consistent with mild concentric hypertrophy.    Left ventricular diastolic function is consistent with (grade I) impaired relaxation.    Aortic valvular sclerosis noted.  There is no hemodynamically significant stenosis or regurgitation.      Labs Pending at Discharge:  Pending Labs       Order Current Status    Blood Culture - Blood, Arm, Right Preliminary result    Blood Culture - Blood, Hand, Right Preliminary result              Discharge Details             Discharge Disposition:              No future appointments.        Time spent on Discharge including face to face service: 35 minutes.            I have dictated this note utilizing Dragon Dictation.             Please note that portions of this note were completed with a voice recognition program.             Part of this note may be an electronic transcription/translation of spoken language to printed text         using the Dragon Dictation System.       Electronically signed by Jaya Yan MD, 24, 7:52 PM EDT.

## 2024-06-04 LAB
BACTERIA SPEC AEROBE CULT: NORMAL
BACTERIA SPEC AEROBE CULT: NORMAL
BH CV ECHO MEAS - ACS: 1.9 CM
BH CV ECHO MEAS - AO MAX PG: 11.6 MMHG
BH CV ECHO MEAS - AO MEAN PG: 6 MMHG
BH CV ECHO MEAS - AO ROOT DIAM: 3 CM
BH CV ECHO MEAS - AO V2 MAX: 170 CM/SEC
BH CV ECHO MEAS - AO V2 VTI: 35.6 CM
BH CV ECHO MEAS - AVA(I,D): 2.04 CM2
BH CV ECHO MEAS - EDV(CUBED): 79.5 ML
BH CV ECHO MEAS - EDV(MOD-SP2): 75.8 ML
BH CV ECHO MEAS - EDV(MOD-SP4): 58.2 ML
BH CV ECHO MEAS - EF(MOD-BP): 48.6 %
BH CV ECHO MEAS - EF(MOD-SP2): 52.4 %
BH CV ECHO MEAS - EF(MOD-SP4): 45.9 %
BH CV ECHO MEAS - ESV(CUBED): 35.9 ML
BH CV ECHO MEAS - ESV(MOD-SP2): 36.1 ML
BH CV ECHO MEAS - ESV(MOD-SP4): 31.5 ML
BH CV ECHO MEAS - FS: 23.3 %
BH CV ECHO MEAS - IVS/LVPW: 1.18 CM
BH CV ECHO MEAS - IVSD: 1.3 CM
BH CV ECHO MEAS - LA DIMENSION: 2.9 CM
BH CV ECHO MEAS - LAT PEAK E' VEL: 6.7 CM/SEC
BH CV ECHO MEAS - LV MASS(C)D: 184.7 GRAMS
BH CV ECHO MEAS - LV MAX PG: 4 MMHG
BH CV ECHO MEAS - LV MEAN PG: 2 MMHG
BH CV ECHO MEAS - LV V1 MAX: 99.8 CM/SEC
BH CV ECHO MEAS - LV V1 VTI: 23.1 CM
BH CV ECHO MEAS - LVIDD: 4.3 CM
BH CV ECHO MEAS - LVIDS: 3.3 CM
BH CV ECHO MEAS - LVOT AREA: 3.1 CM2
BH CV ECHO MEAS - LVOT DIAM: 2 CM
BH CV ECHO MEAS - LVPWD: 1.1 CM
BH CV ECHO MEAS - MED PEAK E' VEL: 6.1 CM/SEC
BH CV ECHO MEAS - MV A MAX VEL: 112 CM/SEC
BH CV ECHO MEAS - MV DEC TIME: 0.21 SEC
BH CV ECHO MEAS - MV E MAX VEL: 99.6 CM/SEC
BH CV ECHO MEAS - MV E/A: 0.89
BH CV ECHO MEAS - SV(LVOT): 72.6 ML
BH CV ECHO MEAS - SV(MOD-SP2): 39.7 ML
BH CV ECHO MEAS - SV(MOD-SP4): 26.7 ML
BH CV ECHO MEAS - TAPSE (>1.6): 2.25 CM
BH CV ECHO MEASUREMENTS AVERAGE E/E' RATIO: 15.56
LEFT ATRIUM VOLUME INDEX: 25.5 ML/M2

## 2024-06-05 NOTE — PROGRESS NOTES
"Enter Query Response Below      Query Response: likely a combination of hypovolemic and hemorrhagic shock             If applicable, please update the problem list.     Patient: Rebecca Mchugh        : 1950  Account: [id number]           Admit Date: 2024        How to Respond to this query:       a. Click New Note     b. Answer query within the yellow box.                c. Update the Problem List, if applicable.      If you have any questions about this query contact me at: [email or web address]     Dr. Garcia,     73-year-old female presented with abdominal pain admitted 24 with acute kidney failure and hyperkalemia per H&P.  Pulmonology Consult on  includes \"Aggressive fluid resuscitation given acute renal failure which likely have a prerenal component seen by patient's collapsible IJ, vomiting, decreased oral intake, will discuss with renal.\"  Nephrology Note  includes \" Subsequently upon intubation noted to have some pooling of blood and NG was placed with about 450 cc output of dark blood Hemoglobin continued to trend down and was given emergently 2 units of blood\" and \"Noted patient developing DIC.\" Gastroenterology Consult on  includes \"Melena\" and \"Hematemesis.\"  Discharge Summary includes \"She has been taking a lot of NSAIDs for pain control she is on anticoagulation with Eliquis for A-fib.\" and \"Intensivist Dr. Garcia was notified patient was admitted to ICU aggressive supportive care was initiated but later in the night patient decompensated she became hypotensive and went into shock, also suffered from GI bleed.\"  Vital signs on  included: Blood Pressure as low as 50/34 and MAP as low as 41.  Labs included the following:  RBCs: 2.86 (), 2.89 ()  Hemoglobin: 8.3 (), 8.2 (), 5.8 ( 0504), and 11.7 ( 0711)  Hematocrit: 24.9 (), 25.8 ( 0243), 17.4 ( 0504), 35.8 ( 0711)  Potassium as high as 7.0, sodium as low as 118, and creatinine as " high as 8.56 on 5/29.  Treatment included: 1L IV NS Bolus (5/29 2003), IV Levophed drip, 1L IV LR Bolus (5/29 2230), 1L IV NS Bolus (5/29 2300), IV Vasopressin drip, IV Epinephrine drip, IV Sodium Bicarb infusion, IV Protonix drip, IV Calcium Gluconate, IV Cefepime, IV Vancomycin, Gastroenterology Consult, Nephrology Consult, Pulmonology Consult, Emergent hemodialysis, 2 units of PRBCs, intubation with mechanical ventilation, and transition to comfort measures.      Please clarify if patient treated/monitored for one or more of the following:     Hemorrhagic shock  Hypovolemic shock  Other (please specify) ________  Unable to determine       By submitting this query, we are merely seeking further clarification of documentation to accurately reflect all conditions that you are monitoring, evaluating, treating or that extend the hospitalization or utilize additional resources of care. Please utilize your independent clinical judgment when addressing the question(s) above.     This query and your response, once completed, will be entered into the legal medical record.    Sincerely,  Golden Hobbs, CARLAN, RN   Clinical Documentation Integrity Program

## (undated) DEVICE — GAUZE,SPONGE,4"X4",16PLY,STRL,LF,10/TRAY: Brand: MEDLINE

## (undated) DEVICE — SOLIDIFIER LIQLOC PLS 1500CC BT

## (undated) DEVICE — BNDG COMPR S/ADHR 1IN 5YD TN NS

## (undated) DEVICE — INTENDED FOR TISSUE SEPARATION, AND OTHER PROCEDURES THAT REQUIRE A SHARP SURGICAL BLADE TO PUNCTURE OR CUT.: Brand: BARD-PARKER ® CARBON RIB-BACK BLADES

## (undated) DEVICE — GLV SURG ULTRAFREE MAX LTX PF 8

## (undated) DEVICE — Device: Brand: DEFENDO AIR/WATER/SUCTION AND BIOPSY VALVE

## (undated) DEVICE — STERILE POLYISOPRENE POWDER-FREE SURGICAL GLOVES: Brand: PROTEXIS

## (undated) DEVICE — APPL CHLORAPREP HI/LITE 26ML ORNG

## (undated) DEVICE — BLD OPTH BEAVER/MINIBLADE STR 180DEG DBL/BVL SS

## (undated) DEVICE — BNDG GZ SOF-FORM CONFRM 2X75IN LF STRL

## (undated) DEVICE — DRSNG GZ PETROLTM XEROFORM CURAD 1X8IN STRL

## (undated) DEVICE — Device

## (undated) DEVICE — LINER SURG CANSTR SXN S/RIGD 1500CC

## (undated) DEVICE — SUT ETHLN 4/0 FS2 18IN 662H

## (undated) DEVICE — BLCK/BITE BLOX WO/DENTL/RIM W/STRAP 54F

## (undated) DEVICE — SOL IRRG H2O PL/BG 1000ML STRL

## (undated) DEVICE — GLV SURG SENSICARE W/ALOE PF LF 7 STRL

## (undated) DEVICE — GLV SURG SENSICARE SLT PF LF 7 STRL

## (undated) DEVICE — BNDG ESMARK 4IN 12FT LF STRL BLU

## (undated) DEVICE — CONN JET HYDRA H20 AUXILIARY DISP

## (undated) DEVICE — EXTREMITY-LF: Brand: MEDLINE INDUSTRIES, INC.